# Patient Record
Sex: MALE | Race: BLACK OR AFRICAN AMERICAN | NOT HISPANIC OR LATINO | URBAN - METROPOLITAN AREA
[De-identification: names, ages, dates, MRNs, and addresses within clinical notes are randomized per-mention and may not be internally consistent; named-entity substitution may affect disease eponyms.]

---

## 2017-10-01 ENCOUNTER — EMERGENCY (EMERGENCY)
Facility: HOSPITAL | Age: 52
LOS: 1 days | Discharge: HOME | End: 2017-10-01
Attending: EMERGENCY MEDICINE | Admitting: EMERGENCY MEDICINE
Payer: SELF-PAY

## 2017-10-01 VITALS
TEMPERATURE: 98 F | SYSTOLIC BLOOD PRESSURE: 144 MMHG | OXYGEN SATURATION: 99 % | RESPIRATION RATE: 16 BRPM | DIASTOLIC BLOOD PRESSURE: 83 MMHG | HEART RATE: 68 BPM

## 2017-10-01 VITALS
TEMPERATURE: 98 F | DIASTOLIC BLOOD PRESSURE: 90 MMHG | OXYGEN SATURATION: 98 % | HEART RATE: 92 BPM | WEIGHT: 199.96 LBS | SYSTOLIC BLOOD PRESSURE: 152 MMHG | RESPIRATION RATE: 18 BRPM

## 2017-10-01 PROCEDURE — 99283 EMERGENCY DEPT VISIT LOW MDM: CPT

## 2017-10-01 RX ORDER — AZTREONAM 2 G
1 VIAL (EA) INJECTION
Qty: 14 | Refills: 0 | OUTPATIENT
Start: 2017-10-01 | End: 2017-10-08

## 2017-10-01 RX ADMIN — Medication 1 TABLET(S): at 20:03

## 2017-10-01 NOTE — ED PROVIDER NOTE - OBJECTIVE STATEMENT
51 m co urinary retention- states he drank 1 beer earlier today, went to the gym, has been unable   to urinate for hours - has had the same problem before in the past- when he drinks a beer  he goes into urinary retention  no f/c no urg/freq/dysuria  + abd distention and pressure-mod severe

## 2017-10-01 NOTE — ED ADULT NURSE NOTE - OBJECTIVE STATEMENT
Pt c/o urinary retention x 10 hours. Stated "I have enlarged prostate. I had a beer and I cant pee now." Pt denies fever, chills, N/V.

## 2017-10-01 NOTE — ED PROVIDER NOTE - MEDICAL DECISION MAKING DETAILS
urinary retention, valdez draining urine, px feels complete relief, d/c home leg bag, fu urology 1 week

## 2017-10-05 DIAGNOSIS — R33.9 RETENTION OF URINE, UNSPECIFIED: ICD-10-CM

## 2017-10-05 DIAGNOSIS — Z79.2 LONG TERM (CURRENT) USE OF ANTIBIOTICS: ICD-10-CM

## 2017-10-06 ENCOUNTER — EMERGENCY (EMERGENCY)
Facility: HOSPITAL | Age: 52
LOS: 1 days | Discharge: HOME | End: 2017-10-06
Admitting: EMERGENCY MEDICINE
Payer: SELF-PAY

## 2017-10-06 VITALS
RESPIRATION RATE: 16 BRPM | WEIGHT: 195.11 LBS | OXYGEN SATURATION: 98 % | SYSTOLIC BLOOD PRESSURE: 130 MMHG | HEART RATE: 77 BPM | TEMPERATURE: 98 F | DIASTOLIC BLOOD PRESSURE: 83 MMHG

## 2017-10-06 DIAGNOSIS — Z79.2 LONG TERM (CURRENT) USE OF ANTIBIOTICS: ICD-10-CM

## 2017-10-06 DIAGNOSIS — N40.1 BENIGN PROSTATIC HYPERPLASIA WITH LOWER URINARY TRACT SYMPTOMS: ICD-10-CM

## 2017-10-06 DIAGNOSIS — N50.89 OTHER SPECIFIED DISORDERS OF THE MALE GENITAL ORGANS: ICD-10-CM

## 2017-10-06 PROCEDURE — 99282 EMERGENCY DEPT VISIT SF MDM: CPT | Mod: 25

## 2017-10-06 PROCEDURE — 99282 EMERGENCY DEPT VISIT SF MDM: CPT

## 2017-10-06 PROCEDURE — 99053 MED SERV 10PM-8AM 24 HR FAC: CPT

## 2017-10-06 NOTE — ED PROVIDER NOTE - OBJECTIVE STATEMENT
patient with valdez placed recently for retention and requests it be removed now as cannot work with catheter in place and has no plausible urology follow up + reports similar pathology in past-> Valdez placed several days ago after reported retention

## 2017-10-06 NOTE — ED ADULT NURSE NOTE - OBJECTIVE STATEMENT
Patient  stated that he went drinking beer and every time he goes drinking his prostate swells, as per patient a Jacques was placed for urinary retention and he has had it in for about 4 days but he wants it taken out, because it is loose and he usually only keeps it in a few days,. Patient denies any discomfort or pain at this time

## 2017-10-06 NOTE — ED PROVIDER NOTE - MEDICAL DECISION MAKING DETAILS
patient adamant for Jacques to be removed ( Hx doing well at times and not well secondary to BPH and retention ) Counseled on return and Urology remains a follow up necessity patient adamant for Jacques to be removed ( Hx doing well at times and not well secondary to BPH and retention ) Counseled on return and Urology remains a follow up necessity ( + micturition in ED ) patient adamant for Jacques to be removed ( Hx doing well at times and not well secondary to BPH and retention ) Counseled on return and Urology remains a follow up necessity ( + micturition in ED ).

## 2017-10-06 NOTE — ED PROVIDER NOTE - CARE PLAN
Principal Discharge DX:	Benign prostatic hyperplasia, presence of lower urinary tract symptoms unspecified, unspecified morphology

## 2017-10-06 NOTE — ED ADULT NURSE NOTE - CHPI ED SYMPTOMS NEG
no dysuria/no chills/no hematuria/no vomiting/no blood in stool/no diarrhea/no abdominal distension/no nausea/no burning urination/no fever

## 2018-03-02 ENCOUNTER — EMERGENCY (EMERGENCY)
Facility: HOSPITAL | Age: 53
LOS: 1 days | Discharge: HOME | End: 2018-03-02
Attending: EMERGENCY MEDICINE | Admitting: EMERGENCY MEDICINE
Payer: SELF-PAY

## 2018-03-02 VITALS
DIASTOLIC BLOOD PRESSURE: 100 MMHG | TEMPERATURE: 98 F | RESPIRATION RATE: 18 BRPM | SYSTOLIC BLOOD PRESSURE: 177 MMHG | OXYGEN SATURATION: 95 % | HEART RATE: 107 BPM

## 2018-03-02 VITALS
RESPIRATION RATE: 18 BRPM | SYSTOLIC BLOOD PRESSURE: 134 MMHG | DIASTOLIC BLOOD PRESSURE: 90 MMHG | HEART RATE: 99 BPM | OXYGEN SATURATION: 95 %

## 2018-03-02 DIAGNOSIS — R33.9 RETENTION OF URINE, UNSPECIFIED: ICD-10-CM

## 2018-03-02 DIAGNOSIS — N40.1 BENIGN PROSTATIC HYPERPLASIA WITH LOWER URINARY TRACT SYMPTOMS: ICD-10-CM

## 2018-03-02 DIAGNOSIS — Z79.899 OTHER LONG TERM (CURRENT) DRUG THERAPY: ICD-10-CM

## 2018-03-02 LAB
APPEARANCE UR: CLEAR — SIGNIFICANT CHANGE UP
BILIRUB UR-MCNC: NEGATIVE — SIGNIFICANT CHANGE UP
COLOR SPEC: YELLOW — SIGNIFICANT CHANGE UP
DIFF PNL FLD: (no result)
GLUCOSE UR QL: NEGATIVE — SIGNIFICANT CHANGE UP
KETONES UR-MCNC: NEGATIVE — SIGNIFICANT CHANGE UP
LEUKOCYTE ESTERASE UR-ACNC: NEGATIVE — SIGNIFICANT CHANGE UP
NITRITE UR-MCNC: NEGATIVE — SIGNIFICANT CHANGE UP
PH UR: 6 — SIGNIFICANT CHANGE UP (ref 5–8)
PROT UR-MCNC: NEGATIVE MG/DL — SIGNIFICANT CHANGE UP
SP GR SPEC: <=1.005 — SIGNIFICANT CHANGE UP (ref 1–1.03)
UROBILINOGEN FLD QL: 0.2 E.U./DL — SIGNIFICANT CHANGE UP

## 2018-03-02 PROCEDURE — 87086 URINE CULTURE/COLONY COUNT: CPT

## 2018-03-02 PROCEDURE — 99284 EMERGENCY DEPT VISIT MOD MDM: CPT | Mod: 25

## 2018-03-02 PROCEDURE — 99053 MED SERV 10PM-8AM 24 HR FAC: CPT

## 2018-03-02 PROCEDURE — 81001 URINALYSIS AUTO W/SCOPE: CPT

## 2018-03-02 PROCEDURE — 51702 INSERT TEMP BLADDER CATH: CPT

## 2018-03-02 RX ORDER — TAMSULOSIN HYDROCHLORIDE 0.4 MG/1
1 CAPSULE ORAL
Qty: 30 | Refills: 0
Start: 2018-03-02 | End: 2018-03-31

## 2018-03-02 NOTE — ED PROVIDER NOTE - GASTROINTESTINAL, MLM
Abd soft, NT, ND, NABS. No guarding, rebound, or rigidity. No pulsatile abdominal masses. Bladder distention to the level of the umbilicus. Bedside sono shows distended bladder. No CVAT

## 2018-03-02 NOTE — ED ADULT NURSE NOTE - OBJECTIVE STATEMENT
pt states when he holds his urine for too long he needs to come in and get a catheter.  states he last voided a few hours ago. pt states when he holds his urine for too long he needs to come in and get a catheter.  states he last voided a few hours ago.  bladder distended.

## 2018-03-02 NOTE — ED PROVIDER NOTE - MEDICAL DECISION MAKING DETAILS
Pt p/w urinary retention, prior hx, non compliant w/ Flomax. Jacques placement, check UA. Anticipate d/c w/ leg bag, Flomax, f/u Uro

## 2018-03-02 NOTE — ED PROVIDER NOTE - OBJECTIVE STATEMENT
Pt w/ PMHx BPH, urinary retention, p/w urinary retention. Last normal urination 3:30 pm. Pt reports bladder distention and pressure. No flank pain, back pain, abd pain, fever, preceding F/U/D or hematuria. + prior hx U retention needing catheter. Pt is non compliant w/ Flomax. No hx renal disease. No other complaints.

## 2018-03-02 NOTE — ED PROVIDER NOTE - PROGRESS NOTE DETAILS
Jacques catheter placed w/ approx 800 mL clear yellow urine. Will d/c w/ leg bag, Flomax, f/u Urology

## 2018-03-05 LAB
CULTURE RESULTS: NO GROWTH — SIGNIFICANT CHANGE UP
SPECIMEN SOURCE: SIGNIFICANT CHANGE UP

## 2018-03-07 ENCOUNTER — EMERGENCY (EMERGENCY)
Facility: HOSPITAL | Age: 53
LOS: 1 days | Discharge: HOME | End: 2018-03-07
Attending: EMERGENCY MEDICINE | Admitting: EMERGENCY MEDICINE
Payer: SELF-PAY

## 2018-03-07 VITALS
HEART RATE: 83 BPM | SYSTOLIC BLOOD PRESSURE: 152 MMHG | RESPIRATION RATE: 18 BRPM | HEIGHT: 72 IN | OXYGEN SATURATION: 97 % | WEIGHT: 199.96 LBS | DIASTOLIC BLOOD PRESSURE: 99 MMHG | TEMPERATURE: 97 F

## 2018-03-07 VITALS
OXYGEN SATURATION: 97 % | HEART RATE: 81 BPM | RESPIRATION RATE: 18 BRPM | SYSTOLIC BLOOD PRESSURE: 150 MMHG | DIASTOLIC BLOOD PRESSURE: 90 MMHG

## 2018-03-07 DIAGNOSIS — T83.091D OTHER MECHANICAL COMPLICATION OF INDWELLING URETHRAL CATHETER, SUBSEQUENT ENCOUNTER: ICD-10-CM

## 2018-03-07 DIAGNOSIS — Z79.899 OTHER LONG TERM (CURRENT) DRUG THERAPY: ICD-10-CM

## 2018-03-07 LAB
APPEARANCE UR: (no result)
BILIRUB UR-MCNC: NEGATIVE — SIGNIFICANT CHANGE UP
COLOR SPEC: YELLOW — SIGNIFICANT CHANGE UP
DIFF PNL FLD: (no result)
GLUCOSE UR QL: NEGATIVE — SIGNIFICANT CHANGE UP
KETONES UR-MCNC: NEGATIVE — SIGNIFICANT CHANGE UP
LEUKOCYTE ESTERASE UR-ACNC: (no result)
NITRITE UR-MCNC: NEGATIVE — SIGNIFICANT CHANGE UP
PH UR: 6 — SIGNIFICANT CHANGE UP (ref 5–8)
PROT UR-MCNC: 30 MG/DL
SP GR SPEC: 1.02 — SIGNIFICANT CHANGE UP (ref 1–1.03)
UROBILINOGEN FLD QL: 0.2 E.U./DL — SIGNIFICANT CHANGE UP

## 2018-03-07 PROCEDURE — 87086 URINE CULTURE/COLONY COUNT: CPT

## 2018-03-07 PROCEDURE — 81001 URINALYSIS AUTO W/SCOPE: CPT

## 2018-03-07 PROCEDURE — 99283 EMERGENCY DEPT VISIT LOW MDM: CPT

## 2018-03-07 PROCEDURE — 99283 EMERGENCY DEPT VISIT LOW MDM: CPT | Mod: 25

## 2018-03-07 PROCEDURE — 99053 MED SERV 10PM-8AM 24 HR FAC: CPT

## 2018-03-07 PROCEDURE — 87186 SC STD MICRODIL/AGAR DIL: CPT

## 2018-03-07 NOTE — ED PROVIDER NOTE - PROGRESS NOTE DETAILS
pt voided, will have pt fu w urology pt voided, sent UA after pt voided, will have pt fu w urology, pt not wanting to wait for UA results. Advised cont'd use of flomax.

## 2018-03-07 NOTE — ED PROVIDER NOTE - OBJECTIVE STATEMENT
53 yo PMHx BPH, urinary retention w valdez in place, requesting removal, states placed 5 days ago. non compliant with Flomax, no suprapubic pain, abd pain, n/v/f/c. Has had similar issues in the past, no flank pain, no established urologist per pt. Pt has not tried anything for symptoms, no other aggravating or relieving factors.

## 2018-03-07 NOTE — ED ADULT NURSE NOTE - OBJECTIVE STATEMENT
Patient presents to ED c/o "defective catheter." Per patient, when the urinary leg bag fills up with urine, the catheter becomes detached from the leg bag.

## 2018-03-07 NOTE — ED PROVIDER NOTE - GASTROINTESTINAL, MLM
Abdomen soft, non-tender, no guarding. no cvat/flank tenderness Abdomen soft, non-tender, no guarding. no cvat/flank tenderness, no suprapubic tenderness

## 2018-03-07 NOTE — ED ADULT NURSE REASSESSMENT NOTE - NS ED NURSE REASSESS COMMENT FT1
valdez catheter removed. pt able to urinate on own. Pt states he will follow up with urology and take Flomax medication prescribed to him previously.

## 2018-03-07 NOTE — ED ADULT NURSE NOTE - CHPI ED SYMPTOMS NEG
no fever/no vomiting/no hematuria/no burning urination/no dysuria/no diarrhea/no nausea/no abdominal distension/no chills

## 2018-03-07 NOTE — ED PROVIDER NOTE - MEDICAL DECISION MAKING DETAILS
Pt adamant for valdez removal, will remove and attempt voiding trial, non toxic vss Pt adamant for valdez removal, will remove and attempt voiding trial, non toxic vss, non tender abdomen, no cvat.

## 2018-03-09 LAB
-  AMPICILLIN/SULBACTAM: SIGNIFICANT CHANGE UP
-  CEFAZOLIN: SIGNIFICANT CHANGE UP
-  CIPROFLOXACIN: SIGNIFICANT CHANGE UP
-  GENTAMICIN: SIGNIFICANT CHANGE UP
-  LINEZOLID: SIGNIFICANT CHANGE UP
-  OXACILLIN: SIGNIFICANT CHANGE UP
-  TETRACYCLINE: SIGNIFICANT CHANGE UP
-  VANCOMYCIN: SIGNIFICANT CHANGE UP
CULTURE RESULTS: SIGNIFICANT CHANGE UP
METHOD TYPE: SIGNIFICANT CHANGE UP
ORGANISM # SPEC MICROSCOPIC CNT: SIGNIFICANT CHANGE UP
ORGANISM # SPEC MICROSCOPIC CNT: SIGNIFICANT CHANGE UP
SPECIMEN SOURCE: SIGNIFICANT CHANGE UP

## 2018-03-12 RX ORDER — CEPHALEXIN 500 MG
1 CAPSULE ORAL
Qty: 14 | Refills: 0
Start: 2018-03-12 | End: 2018-03-18

## 2018-10-15 ENCOUNTER — EMERGENCY (EMERGENCY)
Facility: HOSPITAL | Age: 53
LOS: 1 days | Discharge: HOME | End: 2018-10-15
Attending: EMERGENCY MEDICINE | Admitting: EMERGENCY MEDICINE
Payer: SELF-PAY

## 2018-10-15 VITALS
HEART RATE: 84 BPM | SYSTOLIC BLOOD PRESSURE: 148 MMHG | RESPIRATION RATE: 16 BRPM | DIASTOLIC BLOOD PRESSURE: 93 MMHG | OXYGEN SATURATION: 100 % | TEMPERATURE: 98 F

## 2018-10-15 VITALS
SYSTOLIC BLOOD PRESSURE: 154 MMHG | WEIGHT: 195.99 LBS | OXYGEN SATURATION: 99 % | DIASTOLIC BLOOD PRESSURE: 97 MMHG | HEART RATE: 77 BPM | RESPIRATION RATE: 16 BRPM | HEIGHT: 72 IN | TEMPERATURE: 98 F

## 2018-10-15 DIAGNOSIS — Z79.899 OTHER LONG TERM (CURRENT) DRUG THERAPY: ICD-10-CM

## 2018-10-15 DIAGNOSIS — N40.1 BENIGN PROSTATIC HYPERPLASIA WITH LOWER URINARY TRACT SYMPTOMS: ICD-10-CM

## 2018-10-15 DIAGNOSIS — R33.9 RETENTION OF URINE, UNSPECIFIED: ICD-10-CM

## 2018-10-15 LAB
APPEARANCE UR: CLEAR — SIGNIFICANT CHANGE UP
BACTERIA # UR AUTO: SIGNIFICANT CHANGE UP /HPF
BILIRUB UR-MCNC: NEGATIVE — SIGNIFICANT CHANGE UP
COLOR SPEC: YELLOW — SIGNIFICANT CHANGE UP
DIFF PNL FLD: ABNORMAL
EPI CELLS # UR: SIGNIFICANT CHANGE UP /HPF (ref 0–5)
GLUCOSE UR QL: NEGATIVE — SIGNIFICANT CHANGE UP
KETONES UR-MCNC: NEGATIVE — SIGNIFICANT CHANGE UP
LEUKOCYTE ESTERASE UR-ACNC: NEGATIVE — SIGNIFICANT CHANGE UP
NITRITE UR-MCNC: NEGATIVE — SIGNIFICANT CHANGE UP
PH UR: 5 — SIGNIFICANT CHANGE UP (ref 5–8)
PROT UR-MCNC: NEGATIVE MG/DL — SIGNIFICANT CHANGE UP
RBC CASTS # UR COMP ASSIST: ABNORMAL /HPF
SP GR SPEC: 1.01 — SIGNIFICANT CHANGE UP (ref 1–1.03)
UROBILINOGEN FLD QL: 0.2 E.U./DL — SIGNIFICANT CHANGE UP
WBC UR QL: < 5 /HPF — SIGNIFICANT CHANGE UP

## 2018-10-15 PROCEDURE — 51702 INSERT TEMP BLADDER CATH: CPT

## 2018-10-15 PROCEDURE — 99283 EMERGENCY DEPT VISIT LOW MDM: CPT

## 2018-10-15 PROCEDURE — 87086 URINE CULTURE/COLONY COUNT: CPT

## 2018-10-15 PROCEDURE — 99283 EMERGENCY DEPT VISIT LOW MDM: CPT | Mod: 25

## 2018-10-15 PROCEDURE — 81001 URINALYSIS AUTO W/SCOPE: CPT

## 2018-10-15 NOTE — ED PROVIDER NOTE - CARE PLAN
Principal Discharge DX:	Urinary retention  Secondary Diagnosis:	Benign prostatic hyperplasia, unspecified whether lower urinary tract symptoms present

## 2018-10-15 NOTE — ED ADULT NURSE NOTE - OBJECTIVE STATEMENT
Pt presents with urinary retention since this AM. Pt states he has history of enlarged prostate and has needed a catheter before but he does not take any medication. Denies fever, chills, N/V/D.

## 2018-10-15 NOTE — ED ADULT NURSE NOTE - NSIMPLEMENTINTERV_GEN_ALL_ED
Implemented All Universal Safety Interventions:  [place] to call system. Call bell, personal items and telephone within reach. Instruct patient to call for assistance. Room bathroom lighting operational. Non-slip footwear when patient is off stretcher. Physically safe environment: no spills, clutter or unnecessary equipment. Stretcher in lowest position, wheels locked, appropriate side rails in place.

## 2018-10-15 NOTE — ED PROVIDER NOTE - OBJECTIVE STATEMENT
53 yo M with hx of BPH with hx of retention in past presenting with inability to urinate for 8 hours.  + dull nonradiating suprapubic pressure and discomfort.  No n/v, fever, chills, flank pain or hematuria.

## 2018-10-17 LAB
CULTURE RESULTS: NO GROWTH — SIGNIFICANT CHANGE UP
SPECIMEN SOURCE: SIGNIFICANT CHANGE UP

## 2018-10-27 ENCOUNTER — EMERGENCY (EMERGENCY)
Facility: HOSPITAL | Age: 53
LOS: 1 days | Discharge: HOME | End: 2018-10-27
Attending: EMERGENCY MEDICINE | Admitting: EMERGENCY MEDICINE
Payer: SELF-PAY

## 2018-10-27 VITALS
DIASTOLIC BLOOD PRESSURE: 92 MMHG | WEIGHT: 187.83 LBS | HEART RATE: 88 BPM | SYSTOLIC BLOOD PRESSURE: 158 MMHG | TEMPERATURE: 98 F | OXYGEN SATURATION: 98 % | RESPIRATION RATE: 18 BRPM
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
## 2018-10-27 DIAGNOSIS — Z79.899 OTHER LONG TERM (CURRENT) DRUG THERAPY: ICD-10-CM

## 2018-10-27 DIAGNOSIS — Z79.2 LONG TERM (CURRENT) USE OF ANTIBIOTICS: ICD-10-CM

## 2018-10-27 DIAGNOSIS — R21 RASH AND OTHER NONSPECIFIC SKIN ERUPTION: ICD-10-CM
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
## 2018-10-27 DIAGNOSIS — T83.098A OTHER MECHANICAL COMPLICATION OF OTHER URINARY CATHETER, INITIAL ENCOUNTER: ICD-10-CM

## 2018-10-27 PROCEDURE — 99282 EMERGENCY DEPT VISIT SF MDM: CPT

## 2018-10-27 NOTE — ED PROVIDER NOTE - MEDICAL DECISION MAKING DETAILS
Patient here for valdez removal after recent retention and here requesting valdez removal, removed w no difficulty and voided, STRONGLY advised urology f/u as concern enlarged prostate may be malignant, pt understood however by response doubt pt will f/u as advised. also advised STD testing / HIV and testing for syphillis due to rash, pt refused.

## 2018-10-27 NOTE — ED ADULT NURSE NOTE - NSIMPLEMENTINTERV_GEN_ALL_ED
Implemented All Universal Safety Interventions:  [place] to call system. Call bell, personal items and telephone within reach. Instruct patient to call for assistance. Room bathroom lighting operational. Non-slip footwear when patient is off stretcher. Physically safe environment: no spills, clutter or unnecessary equipment. Stretcher in lowest position, wheels locked, appropriate side rails in place.

## 2018-10-27 NOTE — ED PROVIDER NOTE - OBJECTIVE STATEMENT
51 yo , reports recurrent urinary retention due to enlarged prostate, recommended to see urologist but didn't and wants valdez out, feels well, no  pain no fever, rash today which he thinks must be from food allergy,

## 2018-10-27 NOTE — ED ADULT NURSE NOTE - OBJECTIVE STATEMENT
pt received in [facility] a & O x 3 pt has a urinary cathter fro BPH which was placed 2 weeks ago , pt states ' I usually have a catheter  for a while they remove and I have replaced when I need it again '

## 2018-10-27 NOTE — ED PROVIDER NOTE - NSPTACCESSSVCSAPPTDETAILS_ED_ALL_ED_FT
Follow up with a UROLOGIST urgently , AS discussed you have not had a workup for your enlarged prostate, You will need to be worked up to rule out Prostate Cancer.

## 2018-10-28 ENCOUNTER — EMERGENCY (EMERGENCY)
Facility: HOSPITAL | Age: 53
LOS: 1 days | Discharge: HOME | End: 2018-10-28
Attending: EMERGENCY MEDICINE | Admitting: EMERGENCY MEDICINE
Payer: SELF-PAY

## 2018-10-28 VITALS
TEMPERATURE: 99 F | RESPIRATION RATE: 18 BRPM | WEIGHT: 188.94 LBS | OXYGEN SATURATION: 98 % | DIASTOLIC BLOOD PRESSURE: 90 MMHG | SYSTOLIC BLOOD PRESSURE: 169 MMHG | HEART RATE: 99 BPM

## 2018-10-28 VITALS
RESPIRATION RATE: 18 BRPM | OXYGEN SATURATION: 95 % | DIASTOLIC BLOOD PRESSURE: 78 MMHG | HEART RATE: 96 BPM | TEMPERATURE: 98 F | SYSTOLIC BLOOD PRESSURE: 134 MMHG

## 2018-10-28 DIAGNOSIS — Z79.899 OTHER LONG TERM (CURRENT) DRUG THERAPY: ICD-10-CM

## 2018-10-28 DIAGNOSIS — Z79.2 LONG TERM (CURRENT) USE OF ANTIBIOTICS: ICD-10-CM

## 2018-10-28 DIAGNOSIS — R33.9 RETENTION OF URINE, UNSPECIFIED: ICD-10-CM

## 2018-10-28 LAB
APPEARANCE UR: CLEAR — SIGNIFICANT CHANGE UP
BACTERIA # UR AUTO: PRESENT /HPF
BILIRUB UR-MCNC: NEGATIVE — SIGNIFICANT CHANGE UP
COLOR SPEC: YELLOW — SIGNIFICANT CHANGE UP
COMMENT - URINE: SIGNIFICANT CHANGE UP
DIFF PNL FLD: ABNORMAL
EPI CELLS # UR: SIGNIFICANT CHANGE UP /HPF (ref 0–5)
GLUCOSE UR QL: NEGATIVE — SIGNIFICANT CHANGE UP
KETONES UR-MCNC: ABNORMAL MG/DL
LEUKOCYTE ESTERASE UR-ACNC: NEGATIVE — SIGNIFICANT CHANGE UP
NITRITE UR-MCNC: NEGATIVE — SIGNIFICANT CHANGE UP
PH UR: 5.5 — SIGNIFICANT CHANGE UP (ref 5–8)
PROT UR-MCNC: NEGATIVE MG/DL — SIGNIFICANT CHANGE UP
RBC CASTS # UR COMP ASSIST: ABNORMAL /HPF
SP GR SPEC: 1.01 — SIGNIFICANT CHANGE UP (ref 1–1.03)
UROBILINOGEN FLD QL: 0.2 E.U./DL — SIGNIFICANT CHANGE UP
WBC UR QL: < 5 /HPF — SIGNIFICANT CHANGE UP

## 2018-10-28 PROCEDURE — 87086 URINE CULTURE/COLONY COUNT: CPT

## 2018-10-28 PROCEDURE — 51702 INSERT TEMP BLADDER CATH: CPT

## 2018-10-28 PROCEDURE — 99283 EMERGENCY DEPT VISIT LOW MDM: CPT | Mod: 25

## 2018-10-28 PROCEDURE — 81001 URINALYSIS AUTO W/SCOPE: CPT

## 2018-10-28 PROCEDURE — 99283 EMERGENCY DEPT VISIT LOW MDM: CPT

## 2018-10-28 NOTE — ED ADULT TRIAGE NOTE - CHIEF COMPLAINT QUOTE
Pt c/o being unable to urinate for hours today, having pelvic pain. Pt states his valdez catheter was removed yesterday.

## 2018-10-28 NOTE — ED PROVIDER NOTE - OBJECTIVE STATEMENT
51 y/o m with hx of urinary retention presents with retention after having valdez removed day prior.  Has not urinated in several hours.  Denies fever, chills.  Pt has not followed up with urology regarding urinary retention.

## 2018-10-28 NOTE — ED ADULT NURSE NOTE - OBJECTIVE STATEMENT
pt in urinary retention ,hasn't passed urine since during the night ,bladder distended,pt had valdez catheter for approx 8 days which was removed yesterday

## 2018-10-28 NOTE — ED PROVIDER NOTE - MEDICAL DECISION MAKING DETAILS
Pt with hx of urinary retention just had valdez removed day prior - unable to urinate . Valdez placed with 800 cc output.  U/a neg.  Pt understands he needs follow up with [facility] for workup of retention.

## 2018-10-29 LAB
CULTURE RESULTS: NO GROWTH — SIGNIFICANT CHANGE UP
SPECIMEN SOURCE: SIGNIFICANT CHANGE UP

## 2018-11-19 ENCOUNTER — EMERGENCY (EMERGENCY)
Facility: HOSPITAL | Age: 53
LOS: 1 days | Discharge: HOME | End: 2018-11-19
Admitting: EMERGENCY MEDICINE
Payer: SELF-PAY

## 2018-11-19 VITALS
TEMPERATURE: 98 F | SYSTOLIC BLOOD PRESSURE: 150 MMHG | HEART RATE: 84 BPM | RESPIRATION RATE: 18 BRPM | DIASTOLIC BLOOD PRESSURE: 89 MMHG | OXYGEN SATURATION: 97 %

## 2018-11-19 DIAGNOSIS — Z79.2 LONG TERM (CURRENT) USE OF ANTIBIOTICS: ICD-10-CM

## 2018-11-19 DIAGNOSIS — T83.098A OTHER MECHANICAL COMPLICATION OF OTHER URINARY CATHETER, INITIAL ENCOUNTER: ICD-10-CM

## 2018-11-19 DIAGNOSIS — Z46.6 ENCOUNTER FOR FITTING AND ADJUSTMENT OF URINARY DEVICE: ICD-10-CM

## 2018-11-19 DIAGNOSIS — Z79.899 OTHER LONG TERM (CURRENT) DRUG THERAPY: ICD-10-CM

## 2018-11-19 PROCEDURE — 99053 MED SERV 10PM-8AM 24 HR FAC: CPT

## 2018-11-19 PROCEDURE — 99282 EMERGENCY DEPT VISIT SF MDM: CPT | Mod: 25

## 2018-11-19 PROCEDURE — 99283 EMERGENCY DEPT VISIT LOW MDM: CPT

## 2018-11-19 NOTE — ED PROVIDER NOTE - OBJECTIVE STATEMENT
The pt is a 51 y/o M, who returns to ED requesting his valdez cath be removed because he is unable to f/u w/gu - placed 3 wks ago. pt states that gets urinary retention every couple of months, hx of BPH, has not seen gu for eval. Denies abd pain, flank pain, fevers, chills, any other c/o

## 2018-11-19 NOTE — ED ADULT NURSE NOTE - NSIMPLEMENTINTERV_GEN_ALL_ED
Implemented All Universal Safety Interventions:  [place] to call system. Call bell, personal items and telephone within reach. Instruct patient to call for assistance. Room bathroom lighting operational. Non-slip footwear when patient is off stretcher. Physically safe environment: no spills, clutter or unnecessary equipment. Stretcher in lowest position, wheels locked, appropriate side rails in place.

## 2018-11-19 NOTE — ED ADULT TRIAGE NOTE - ARRIVAL INFO ADDITIONAL COMMENTS
pt states he had a valdez placed several weeks ago for urinary retention from an enlarged prostate and he called urology but they did not call him back and now he wants the catheter removed.

## 2018-11-19 NOTE — ED ADULT NURSE REASSESSMENT NOTE - NS ED NURSE REASSESS COMMENT FT1
Valdez was removed as ordered by PA. No issues found w/ valdez or urine collected in bag. urine is clear, yellow, odorless and w/o blood. Pt. is satisfied and excited about valdez removal. Pt admits valdez placement prevented some activities of daily living and was psychologically injurious.

## 2018-11-19 NOTE — ED PROVIDER NOTE - MEDICAL DECISION MAKING DETAILS
pt had valdez placed 3  wks ago, never f/u w/gu to get it removed, hx of bph and has not f/u w/gu for eval/tx, valdez removed as per pt's request, urinated w/o dif, importance of gu f/u discussed at length

## 2018-11-19 NOTE — ED ADULT NURSE NOTE - OBJECTIVE STATEMENT
53 y/o male requesting valdez removal. Pt denies pain, urinary complaints. pt reports valdez obstructs his lifestyle at this time and wants it removed. pt speaks clear, MAEx4, ambulates steady, unlabored breathing. Abd soft nt nd. Urine clear, and yellow. Skin dry warm

## 2019-03-23 ENCOUNTER — EMERGENCY (EMERGENCY)
Facility: HOSPITAL | Age: 54
LOS: 1 days | Discharge: HOME | End: 2019-03-23
Attending: EMERGENCY MEDICINE | Admitting: EMERGENCY MEDICINE
Payer: SELF-PAY

## 2019-03-23 VITALS
RESPIRATION RATE: 16 BRPM | TEMPERATURE: 98 F | SYSTOLIC BLOOD PRESSURE: 126 MMHG | DIASTOLIC BLOOD PRESSURE: 78 MMHG | OXYGEN SATURATION: 99 % | HEART RATE: 70 BPM

## 2019-03-23 VITALS
HEIGHT: 73 IN | SYSTOLIC BLOOD PRESSURE: 140 MMHG | WEIGHT: 192.02 LBS | TEMPERATURE: 98 F | OXYGEN SATURATION: 96 % | DIASTOLIC BLOOD PRESSURE: 87 MMHG | HEART RATE: 92 BPM | RESPIRATION RATE: 16 BRPM

## 2019-03-23 PROCEDURE — 99283 EMERGENCY DEPT VISIT LOW MDM: CPT | Mod: 25

## 2019-03-23 PROCEDURE — 51702 INSERT TEMP BLADDER CATH: CPT

## 2019-03-23 PROCEDURE — 99283 EMERGENCY DEPT VISIT LOW MDM: CPT

## 2019-03-23 NOTE — ED PROVIDER NOTE - NSFOLLOWUPINSTRUCTIONS_ED_ALL_ED_FT
Urinary Retention in Men    WHAT YOU NEED TO KNOW:    What is urinary retention? Urinary retention is a condition that develops when your bladder does not empty completely when you urinate.Male Reproductive System         What causes urinary retention?     An enlarged prostate      Blockages, such as a stone, growth, or narrowing of your urethra      A weak bladder muscle      Nerve damage from diabetes, stroke, or spinal cord injury      Bladder diverticula, which are pockets of urine that form in your bladder and do not empty      Certain medicines, such as narcotics, antihistamines, or antidepressants    What are the signs and symptoms of urinary retention?     Frequent urination, or the urge to urinate right after you finish      An urge to urinate, but your urine does not come out or dribbles out slowly and weakly      Frequent urine leaks that happen during the day or while you sleep      Pain or pressure when you urinate      Pain or stiffness in your abdomen, lower back, hips, or upper thighs      Blood in your urine    How is urinary retention diagnosed? Your healthcare provider will ask about your health history and the medicines you take. He will press or tap on your lower abdomen. You may need any of the following tests:     A digital rectal exam is when healthcare providers carefully feel the size of your prostate.      A post void residual test will show how much urine is left in your bladder after you urinate. You will be asked to urinate and then healthcare providers will use a small ultrasound machine to check how much urine is left in your bladder.      Blood or urine tests may show infection or prostate specific antigen (PSA) levels. PSA may be elevated in prostate cancer.      An ultrasound uses sound waves to show pictures on a monitor. An ultrasound may be done to show bladder stones, infection, or other problems.      A CT scan, or CAT scan, is a type of x-ray that is taken of your prostate, kidneys, and bladder. The pictures may show what is causing your urinary retention. You may be given a dye before the pictures are taken to help healthcare providers see the pictures better. Tell the healthcare provider if you have ever had an allergic reaction to contrast dye.    How is urinary retention treated?     A Jacques catheter is a tube put into your bladder to drain urine into a bag. Keep the bag below your waist. This will prevent urine from flowing back into your bladder and causing an infection or other problems. Also, keep the tube free of kinks so the urine will drain properly. Do not pull on the catheter. This can cause pain and bleeding, and may cause the catheter to come out.       Medicines can help decrease the size of your prostate, fight infection, and help you urinate more easily.      Surgery may be needed to treat the condition that is causing your urinary retention.     When should I contact my healthcare provider?     You have a fever.      You have pain when you urinate.      You have blood in your urine.      You have problems with your catheter.      You have questions or concerns about your condition or care.    When should I seek immediate care or call 911?     You have severe abdominal pain.      You are breathing faster than usual.      Your heartbeat is faster than usual.      Your face, hands, feet, or ankles are swollen.     CARE AGREEMENT:    You have the right to help plan your care. Learn about your health condition and how it may be treated. Discuss treatment options with your healthcare providers to decide what care you want to receive. You always have the right to refuse treatment.

## 2019-03-23 NOTE — ED PROVIDER NOTE - PHYSICAL EXAMINATION
CONSTITUTIONAL: Well appearing, well nourished, awake, alert and in no apparent distress.  HEENT: Head is atraumatic. Eyes clear bilaterally, normal EOMI. Airway patent.  CARDIAC: Normal rate, regular rhythm.  Heart sounds S1, S2.   RESPIRATORY: Breath sounds clear and equal bilaterally. no tachypnea, respiratory distress.   GASTROINTESTINAL: Abdomen soft, non-tender, no guarding, distension.  MUSCULOSKELETAL: Spine appears normal, no midline spinal tenderness, range of motion is not limited, no muscle or joint tenderness. no bony tenderness.   NEUROLOGICAL: Alert and oriented, no focal deficits, no motor or sensory deficits.  SKIN: Skin normal color for race, warm, dry and intact. No evidence of rash.  PSYCHIATRIC: Alert and oriented to person, place, time/situation. normal mood and affect. no apparent risk to self or others.

## 2019-03-23 NOTE — ED PROVIDER NOTE - CARE PROVIDER_API CALL
Aden Villarreal)  Urology  [address], [place] [address]  Phone: [phone]  Fax: [phone]  Follow Up Time:     Hugo Castro)  Urology  [address] [place] [address], [address] [address]  Phone: [phone]  Fax: [phone]  Follow Up Time:
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
## 2019-03-23 NOTE — ED ADULT NURSE NOTE - NSFALLRSKASSESSTYPE_ED_ALL_ED
Patient has been in [facility] for one year.   If you wish to have your patient continue in the clinic, please sign attached order.  Thank you.     Initial (On Arrival)

## 2019-03-23 NOTE — ED ADULT NURSE NOTE - OBJECTIVE STATEMENT
Pt presents with urinary retention x few hours today. Pt has history of prostate problems. He has been to the ED multiple times to have valdez placed, usually returns to have it removed a few days later. Pt has yet to follow up with urologist. Pt denies any other c/o.

## 2019-03-23 NOTE — ED ADULT TRIAGE NOTE - CHIEF COMPLAINT QUOTE
pt reports urinary retention for a few hours today. Pt reports previous history of retention and prostate problem

## 2019-03-23 NOTE — ED PROVIDER NOTE - OBJECTIVE STATEMENT
hx of BPH w urinary retention since this morning, has been to ED previously for similar sx, states normally gets valdez, supposed to fu with urology but has not been able to. No recent f/c, flank pain, abd pain, n/v, hematuria, cloudy urine.

## 2019-03-23 NOTE — ED PROVIDER NOTE - CARE PROVIDERS DIRECT ADDRESSES
[email or web address]

## 2019-03-23 NOTE — ED PROVIDER NOTE - CLINICAL SUMMARY MEDICAL DECISION MAKING FREE TEXT BOX
urinary retention wo systemic complaints, no f/c, abnormal VS, will place valdez, fu with urology strongly reiterated.

## 2019-03-27 DIAGNOSIS — R33.9 RETENTION OF URINE, UNSPECIFIED: ICD-10-CM

## 2019-03-27 DIAGNOSIS — Z79.2 LONG TERM (CURRENT) USE OF ANTIBIOTICS: ICD-10-CM

## 2019-03-28 ENCOUNTER — EMERGENCY (EMERGENCY)
Facility: HOSPITAL | Age: 54
LOS: 1 days | Discharge: HOME | End: 2019-03-28
Attending: EMERGENCY MEDICINE | Admitting: EMERGENCY MEDICINE
Payer: SELF-PAY

## 2019-03-28 VITALS
RESPIRATION RATE: 16 BRPM | TEMPERATURE: 98 F | HEART RATE: 77 BPM | DIASTOLIC BLOOD PRESSURE: 87 MMHG | SYSTOLIC BLOOD PRESSURE: 141 MMHG | OXYGEN SATURATION: 97 %

## 2019-03-28 VITALS
OXYGEN SATURATION: 99 % | DIASTOLIC BLOOD PRESSURE: 80 MMHG | RESPIRATION RATE: 16 BRPM | WEIGHT: 199.96 LBS | TEMPERATURE: 97 F | SYSTOLIC BLOOD PRESSURE: 127 MMHG | HEIGHT: 73 IN | HEART RATE: 88 BPM

## 2019-03-28 DIAGNOSIS — Z79.899 OTHER LONG TERM (CURRENT) DRUG THERAPY: ICD-10-CM

## 2019-03-28 DIAGNOSIS — R33.9 RETENTION OF URINE, UNSPECIFIED: ICD-10-CM

## 2019-03-28 DIAGNOSIS — Z79.2 LONG TERM (CURRENT) USE OF ANTIBIOTICS: ICD-10-CM

## 2019-03-28 PROCEDURE — 99283 EMERGENCY DEPT VISIT LOW MDM: CPT

## 2019-03-28 NOTE — ED PROVIDER NOTE - NSFOLLOWUPINSTRUCTIONS_ED_ALL_ED_FT
Acute Urinary Retention, Male  ImageAcute urinary retention is a condition in which a person is unable to pass urine. This can last for a short time or for a long time. If left untreated, it can result in kidney damage or other serious complications.    What are the causes?  This condition may be caused by:    Obstruction or narrowing of the tube that drains the bladder (urethra). This may be caused by surgery or problems with nearby organs, such as the prostate gland, which can press or squeeze the urethra.  Problems with the nerves in the bladder. These can be caused by diseases, such as multiple sclerosis, or by spinal cord injuries.  Certain medicines.  Tumors in the area of the pelvis, bladder, or urethra.  Diabetes.  Degenerative cognitive conditions such as delirium or dementia.  Bladder or urinary tract infection.  Constipation.  Blood in the urine (hematuria).  Injury to the bladder or urethra.   Psychological (psychogenic) conditions. Someone may hold his urine due to trauma or because he does not want to use the bathroom.    What increases the risk?  This condition is more likely to develop in older men. As men age, their prostate may become larger and may start pressing or squeezing on the bladder or the urethra.    What are the signs or symptoms?  Symptoms of this condition include:    Trouble urinating.  Pain in the lower abdomen.    Symptoms usually come on slowly over a long period of time.    How is this diagnosed?  This condition is diagnosed based on a physical exam and a medical history. You may also have other tests, including:    An ultrasound of the bladder or kidneys or both.  Blood tests.  A urine analysis.  Additional tests may be needed such as an MRI, kidney, or bladder function tests.    How is this treated?  Treatment for this condition may include:    Medicines.  Placing a thin, sterile tube (catheter) into the bladder to drain urine out of the body. This is called an indwelling urinary catheter. After being inserted, the catheter is held in place with a small balloon that is filled with sterile water. Urine drains from the catheter into a collection bag outside of the body.  Behavioral therapy.  Treatment for any underlying conditions.  If needed, you may be treated in the hospital for kidney function problems or to manage other complications.    Follow these instructions at home:  Take over-the-counter and prescription medicines only as told by your health care provider. Avoid certain medicines, such as decongestants, antihistamines, and some prescription medicines. Do not take any medicine unless your health care provider has approved.  If you were given an indwelling urinary catheter, take care of it as told by your health care provider.  Drink enough fluid to keep your urine clear or pale yellow.  If you were prescribed an antibiotic, take it as told by your health care provider. Do not stop taking the antibiotic even if you start to feel better.  Do not use any products that contain nicotine or tobacco, such as cigarettes and e-cigarettes. If you need help quitting, ask your health care provider.  Monitor any changes in your symptoms. Tell your health care provider about any changes.  If instructed, monitor your blood pressure at home. Report changes as told by your health care provider.  Keep all follow-up visits as told by your health care provider. This is important.  Contact a health care provider if:  You have uncomfortable bladder contractions that you cannot control (spasms) or you leak urine with the spasms.  Get help right away if:  You have chills or fever.  You have blood in your urine.  You have a catheter and:    Your catheter stops draining urine.  Your catheter falls out.    Summary  Acute urinary retention is a condition in which a person is unable to pass urine. If left untreated, it can result in kidney damage or other serious complications.  The cause of this condition may include an enlarged prostate. As men age, their prostate gland may become larger and may start pressing or squeezing on the bladder or the urethra.  Treatment for this condition may include medicines and placement of an indwelling urinary catheter.  Monitor any changes in your symptoms. Tell your health care provider about any changes.  This information is not intended to replace advice given to you by your health care provider. Make sure you discuss any questions you have with your health care provider.    Document Released: 03/26/2002 Document Revised: 01/19/2018 Document Reviewed: 01/19/2018  [employer] Patient Education © 2019 [employer]

## 2019-03-28 NOTE — ED ADULT NURSE NOTE - OBJECTIVE STATEMENT
Pt requesting to have Jacques catheter removed. Reported Jacques was placed on Saturday for urinary retention due to enlarged prostate. Denies pain, fever, chills, hematuria or any other complaints. Noted Jacques catheter intact draining clear yellow urine.

## 2019-03-28 NOTE — ED PROVIDER NOTE - CLINICAL SUMMARY MEDICAL DECISION MAKING FREE TEXT BOX
px w bph- requesting valdez removal- explained we typically do not remove them- will remove at px req's- acknowledges risk of re- valdez px w bph- requesting valdez removal- explained we typically do not remove them- will remove at px req's- acknowledges risk of re- valdez  px voided 400 ml- will fu with Urology

## 2019-03-28 NOTE — ED PROVIDER NOTE - OBJECTIVE STATEMENT
53 M w hx of bph w urinary retention- came to the ED a few days ago w urinary retention catheter placed- px is homeless without insurance- comes to ED for removal of cath  requesting removal of cath  no exac/allev factors  mod-severe

## 2019-03-28 NOTE — ED ADULT NURSE REASSESSMENT NOTE - NS ED NURSE REASSESS COMMENT FT1
Jacques catheter removed as per verbal order by Dr. Collins. Pt tolerated procedure. Denies any pain or discomfort at site. Noted 150mL of clear yellow urine from urinary drainage bag. Pt placed on voiding trial and informed pt to use urinal. Will continue to monitor.

## 2019-03-28 NOTE — ED ADULT NURSE NOTE - NSIMPLEMENTINTERV_GEN_ALL_ED
Implemented All Universal Safety Interventions:  [place] to call system. Call bell, personal items and telephone within reach. Instruct patient to call for assistance. Room bathroom lighting operational. Non-slip footwear when patient is off stretcher. Physically safe environment: no spills, clutter or unnecessary equipment. Stretcher in lowest position, wheels locked, appropriate side rails in place.

## 2019-03-28 NOTE — ED ADULT TRIAGE NOTE - CHIEF COMPLAINT QUOTE
Pt w/ urinary catheter in place for BPH presents to ED today seeking catheter removal.  Pt elicits 14F in place that is patent and draining fluid.  Pt denies pain, fever/chills.  Pt states he has not seen his urologist recently.

## 2019-05-11 NOTE — ED ADULT NURSE NOTE - CAS DISCH ACCOMP BY
Bedside and Verbal shift change report received from . Report included the following information SBAR, Kardex, ED Summary, Intake/Output, MAR and Recent Results. Self

## 2019-06-13 NOTE — ED ADULT NURSE NOTE - CAS TRG GEN SKIN CONDITION
I sent in [place] twice a day hopefully that will work if not then I need to refer her to dermatology I sent it to [facility] should come soon thanks Dry/Warm

## 2019-06-26 ENCOUNTER — EMERGENCY (EMERGENCY)
Facility: HOSPITAL | Age: 54
LOS: 1 days | Discharge: HOME | End: 2019-06-26
Attending: EMERGENCY MEDICINE | Admitting: EMERGENCY MEDICINE
Payer: SELF-PAY

## 2019-06-26 VITALS
SYSTOLIC BLOOD PRESSURE: 163 MMHG | DIASTOLIC BLOOD PRESSURE: 96 MMHG | HEART RATE: 90 BPM | WEIGHT: 190.04 LBS | RESPIRATION RATE: 20 BRPM | TEMPERATURE: 98 F | OXYGEN SATURATION: 99 % | HEIGHT: 73 IN

## 2019-06-26 DIAGNOSIS — R33.9 RETENTION OF URINE, UNSPECIFIED: ICD-10-CM

## 2019-06-26 DIAGNOSIS — N40.1 BENIGN PROSTATIC HYPERPLASIA WITH LOWER URINARY TRACT SYMPTOMS: ICD-10-CM

## 2019-06-26 LAB
APPEARANCE UR: CLEAR — SIGNIFICANT CHANGE UP
BILIRUB UR-MCNC: NEGATIVE — SIGNIFICANT CHANGE UP
COLOR SPEC: YELLOW — SIGNIFICANT CHANGE UP
DIFF PNL FLD: NEGATIVE — SIGNIFICANT CHANGE UP
GLUCOSE UR QL: NEGATIVE — SIGNIFICANT CHANGE UP
KETONES UR-MCNC: NEGATIVE — SIGNIFICANT CHANGE UP
LEUKOCYTE ESTERASE UR-ACNC: NEGATIVE — SIGNIFICANT CHANGE UP
NITRITE UR-MCNC: NEGATIVE — SIGNIFICANT CHANGE UP
PH UR: 6.5 — SIGNIFICANT CHANGE UP (ref 5–8)
PROT UR-MCNC: NEGATIVE MG/DL — SIGNIFICANT CHANGE UP
SP GR SPEC: 1.01 — SIGNIFICANT CHANGE UP (ref 1–1.03)
UROBILINOGEN FLD QL: 0.2 E.U./DL — SIGNIFICANT CHANGE UP

## 2019-06-26 PROCEDURE — 51702 INSERT TEMP BLADDER CATH: CPT

## 2019-06-26 PROCEDURE — 99283 EMERGENCY DEPT VISIT LOW MDM: CPT

## 2019-06-26 PROCEDURE — 81003 URINALYSIS AUTO W/O SCOPE: CPT

## 2019-06-26 PROCEDURE — 99283 EMERGENCY DEPT VISIT LOW MDM: CPT | Mod: 25

## 2019-06-26 PROCEDURE — 87086 URINE CULTURE/COLONY COUNT: CPT

## 2019-06-26 RX ORDER — CEFPODOXIME PROXETIL 100 MG
100 TABLET ORAL ONCE
Refills: 0 | Status: COMPLETED | OUTPATIENT
Start: 2019-06-26 | End: 2019-06-26

## 2019-06-26 RX ORDER — CEFPODOXIME PROXETIL 100 MG
1 TABLET ORAL
Qty: 14 | Refills: 0
Start: 2019-06-26 | End: 2019-07-02

## 2019-06-26 RX ADMIN — Medication 100 MILLIGRAM(S): at 23:32

## 2019-06-26 NOTE — ED PROVIDER NOTE - CARE PLAN
Principal Discharge DX:	Urinary retention  Secondary Diagnosis:	Benign prostatic hyperplasia, presence of lower urinary tract symptoms unspecified, unspecified morphology

## 2019-06-26 NOTE — ED ADULT NURSE NOTE - CHPI ED NUR SYMPTOMS NEG
no fever/no hematuria/no blood in stool/no diarrhea/no nausea/no dysuria/no chills/no abdominal distension

## 2019-06-26 NOTE — ED PROVIDER NOTE - ATTENDING CONTRIBUTION TO CARE
acute onset urinary retention likely secondary to BPH, pt well appearing , no longer uncomfortable, no UTI, stable for outpt urology follow up.

## 2019-06-26 NOTE — ED PROVIDER NOTE - OBJECTIVE STATEMENT
Hx BPH and multiple catheters / chronic at times indwelling catheter -> now presents as in retention today  seeking relief no F/C Hx BPH and multiple catheters  / chronic at times indwelling catheter -> now presents as in retention today  seeking relief no F/C

## 2019-06-26 NOTE — ED ADULT NURSE REASSESSMENT NOTE - NS ED NURSE REASSESS COMMENT FT1
Jacques catheter inserted as ordered by JINA Bateman.  A 16Fr coude catheter was used.  Sterile technique completed throughout procedure and catheter secured to right medial thigh using Stat Lock.  Patient tolerated procedure well.  Clear Yellow urine noted upon insertion.  Initial output of 600mL of urine noted.  Will change patient to leg bag once d/c'd from ED.

## 2019-06-26 NOTE — ED PROVIDER NOTE - NSFOLLOWUPCLINICS_GEN_ALL_ED_FT
[facility]  Urology  [address]  [place], [place] [address]  Phone: [phone]  Fax:   Follow Up Time:

## 2019-06-26 NOTE — ED ADULT NURSE NOTE - OBJECTIVE STATEMENT
Presents to ED for Urinary retention x 7 hours.  Patient reports history of urinary retention in the past due to enlarged prostate which is relieved with valdez catheter placement for approximately 1 week each time.  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  Denies any fevers, chills, hematuria, incontinence, CP, abd pain, SOB.

## 2019-06-26 NOTE — ED ADULT NURSE REASSESSMENT NOTE - NS ED NURSE REASSESS COMMENT FT1
Patient d/c'd with valdez catheter in place.  Attached to Leg bag as ordered.  Patient retaught about valdez care and to return if new symptoms arise.  Abx given as ordered and medication for outpatient reviewed.

## 2019-06-28 LAB
CULTURE RESULTS: NO GROWTH — SIGNIFICANT CHANGE UP
SPECIMEN SOURCE: SIGNIFICANT CHANGE UP

## 2019-07-02 ENCOUNTER — EMERGENCY (EMERGENCY)
Facility: HOSPITAL | Age: 54
LOS: 1 days | Discharge: HOME | End: 2019-07-02
Admitting: EMERGENCY MEDICINE
Payer: SELF-PAY

## 2019-07-02 VITALS
HEART RATE: 90 BPM | RESPIRATION RATE: 18 BRPM | HEIGHT: 73 IN | SYSTOLIC BLOOD PRESSURE: 153 MMHG | TEMPERATURE: 98 F | OXYGEN SATURATION: 97 % | WEIGHT: 187.39 LBS | DIASTOLIC BLOOD PRESSURE: 92 MMHG

## 2019-07-02 DIAGNOSIS — Z46.6 ENCOUNTER FOR FITTING AND ADJUSTMENT OF URINARY DEVICE: ICD-10-CM

## 2019-07-02 PROCEDURE — 99283 EMERGENCY DEPT VISIT LOW MDM: CPT

## 2019-07-02 PROCEDURE — 99282 EMERGENCY DEPT VISIT SF MDM: CPT

## 2019-07-02 PROCEDURE — 99053 MED SERV 10PM-8AM 24 HR FAC: CPT

## 2019-07-02 NOTE — ED PROVIDER NOTE - CARE PROVIDER_API CALL
Aden Villarreal)  Urology  [address], [facility] [place], [place] [address]  Phone: [phone]  Fax: [phone]  Follow Up Time:

## 2019-07-02 NOTE — ED PROVIDER NOTE - OBJECTIVE STATEMENT
52 yo M with pmh of BPH requesting removal of catheter. Valdez placed 5 days ago for retention. [place] and every 6 months he goes into retention and comes to the ED to have a valdez placed and taken out because he does not have insurance. Denies fever, chills, abd pain, n/v, dysuria, hematuria.

## 2019-07-02 NOTE — ED PROVIDER NOTE - NSFOLLOWUPINSTRUCTIONS_ED_ALL_ED_FT
Urinary Retention    Urinary retention is the inability to completely empty your bladder. This is a common problem in older men, especially with enlarged prostates. If you are sent home with a valdez catheter and a drainage system make sure to keep the drainage bag emptied and lower than your catheter. Keep the valdez catheter in until you follow up with a urologist.    SEEK IMMEDIATE MEDICAL CARE IF YOU DEVELOP THE FOLLOWING SYMPTOMS: the catheter stops draining urine, the catheter falls out, abdominal pain, nausea/vomiting, or chills/fever.

## 2019-07-08 ENCOUNTER — INBOUND DOCUMENT (OUTPATIENT)
Age: 54
End: 2019-07-08

## 2019-09-06 ENCOUNTER — EMERGENCY (EMERGENCY)
Facility: HOSPITAL | Age: 54
LOS: 1 days | Discharge: HOME | End: 2019-09-06
Attending: EMERGENCY MEDICINE | Admitting: EMERGENCY MEDICINE
Payer: SELF-PAY

## 2019-09-06 VITALS
OXYGEN SATURATION: 100 % | TEMPERATURE: 98 F | HEIGHT: 73 IN | WEIGHT: 190.04 LBS | HEART RATE: 93 BPM | RESPIRATION RATE: 19 BRPM | SYSTOLIC BLOOD PRESSURE: 156 MMHG | DIASTOLIC BLOOD PRESSURE: 87 MMHG

## 2019-09-06 VITALS
RESPIRATION RATE: 18 BRPM | OXYGEN SATURATION: 100 % | SYSTOLIC BLOOD PRESSURE: 137 MMHG | TEMPERATURE: 98 F | HEART RATE: 82 BPM | DIASTOLIC BLOOD PRESSURE: 82 MMHG

## 2019-09-06 DIAGNOSIS — R33.9 RETENTION OF URINE, UNSPECIFIED: ICD-10-CM

## 2019-09-06 LAB
ALBUMIN SERPL ELPH-MCNC: 4.5 G/DL — SIGNIFICANT CHANGE UP (ref 3.3–5)
ALP SERPL-CCNC: 62 U/L — SIGNIFICANT CHANGE UP (ref 40–120)
ALT FLD-CCNC: 13 U/L — SIGNIFICANT CHANGE UP (ref 10–45)
ANION GAP SERPL CALC-SCNC: 12 MMOL/L — SIGNIFICANT CHANGE UP (ref 5–17)
APPEARANCE UR: ABNORMAL
AST SERPL-CCNC: 16 U/L — SIGNIFICANT CHANGE UP (ref 10–40)
BACTERIA # UR AUTO: PRESENT /HPF
BASOPHILS # BLD AUTO: 0.04 K/UL — SIGNIFICANT CHANGE UP (ref 0–0.2)
BASOPHILS NFR BLD AUTO: 0.5 % — SIGNIFICANT CHANGE UP (ref 0–2)
BILIRUB SERPL-MCNC: 1.5 MG/DL — HIGH (ref 0.2–1.2)
BILIRUB UR-MCNC: NEGATIVE — SIGNIFICANT CHANGE UP
BUN SERPL-MCNC: 15 MG/DL — SIGNIFICANT CHANGE UP (ref 7–23)
CALCIUM SERPL-MCNC: 9.5 MG/DL — SIGNIFICANT CHANGE UP (ref 8.4–10.5)
CHLORIDE SERPL-SCNC: 101 MMOL/L — SIGNIFICANT CHANGE UP (ref 96–108)
CO2 SERPL-SCNC: 25 MMOL/L — SIGNIFICANT CHANGE UP (ref 22–31)
COLOR SPEC: YELLOW — SIGNIFICANT CHANGE UP
COMMENT - URINE: SIGNIFICANT CHANGE UP
CREAT SERPL-MCNC: 0.89 MG/DL — SIGNIFICANT CHANGE UP (ref 0.5–1.3)
DIFF PNL FLD: ABNORMAL
EOSINOPHIL # BLD AUTO: 0.05 K/UL — SIGNIFICANT CHANGE UP (ref 0–0.5)
EOSINOPHIL NFR BLD AUTO: 0.6 % — SIGNIFICANT CHANGE UP (ref 0–6)
EPI CELLS # UR: SIGNIFICANT CHANGE UP /HPF (ref 0–5)
GLUCOSE SERPL-MCNC: 103 MG/DL — HIGH (ref 70–99)
GLUCOSE UR QL: NEGATIVE — SIGNIFICANT CHANGE UP
HCT VFR BLD CALC: 40.6 % — SIGNIFICANT CHANGE UP (ref 39–50)
HGB BLD-MCNC: 13.2 G/DL — SIGNIFICANT CHANGE UP (ref 13–17)
IMM GRANULOCYTES NFR BLD AUTO: 0.5 % — SIGNIFICANT CHANGE UP (ref 0–1.5)
KETONES UR-MCNC: NEGATIVE — SIGNIFICANT CHANGE UP
LEUKOCYTE ESTERASE UR-ACNC: NEGATIVE — SIGNIFICANT CHANGE UP
LYMPHOCYTES # BLD AUTO: 1.01 K/UL — SIGNIFICANT CHANGE UP (ref 1–3.3)
LYMPHOCYTES # BLD AUTO: 12.2 % — LOW (ref 13–44)
MCHC RBC-ENTMCNC: 28.9 PG — SIGNIFICANT CHANGE UP (ref 27–34)
MCHC RBC-ENTMCNC: 32.5 GM/DL — SIGNIFICANT CHANGE UP (ref 32–36)
MCV RBC AUTO: 88.8 FL — SIGNIFICANT CHANGE UP (ref 80–100)
MONOCYTES # BLD AUTO: 0.45 K/UL — SIGNIFICANT CHANGE UP (ref 0–0.9)
MONOCYTES NFR BLD AUTO: 5.4 % — SIGNIFICANT CHANGE UP (ref 2–14)
NEUTROPHILS # BLD AUTO: 6.69 K/UL — SIGNIFICANT CHANGE UP (ref 1.8–7.4)
NEUTROPHILS NFR BLD AUTO: 80.8 % — HIGH (ref 43–77)
NITRITE UR-MCNC: NEGATIVE — SIGNIFICANT CHANGE UP
NRBC # BLD: 0 /100 WBCS — SIGNIFICANT CHANGE UP (ref 0–0)
PH UR: 7 — SIGNIFICANT CHANGE UP (ref 5–8)
PLATELET # BLD AUTO: 208 K/UL — SIGNIFICANT CHANGE UP (ref 150–400)
POTASSIUM SERPL-MCNC: 3.8 MMOL/L — SIGNIFICANT CHANGE UP (ref 3.5–5.3)
POTASSIUM SERPL-SCNC: 3.8 MMOL/L — SIGNIFICANT CHANGE UP (ref 3.5–5.3)
PROT SERPL-MCNC: 8 G/DL — SIGNIFICANT CHANGE UP (ref 6–8.3)
PROT UR-MCNC: ABNORMAL MG/DL
RBC # BLD: 4.57 M/UL — SIGNIFICANT CHANGE UP (ref 4.2–5.8)
RBC # FLD: 12.8 % — SIGNIFICANT CHANGE UP (ref 10.3–14.5)
RBC CASTS # UR COMP ASSIST: > 10 /HPF
SODIUM SERPL-SCNC: 138 MMOL/L — SIGNIFICANT CHANGE UP (ref 135–145)
SP GR SPEC: <=1.005 — SIGNIFICANT CHANGE UP (ref 1–1.03)
UROBILINOGEN FLD QL: 0.2 E.U./DL — SIGNIFICANT CHANGE UP
WBC # BLD: 8.28 K/UL — SIGNIFICANT CHANGE UP (ref 3.8–10.5)
WBC # FLD AUTO: 8.28 K/UL — SIGNIFICANT CHANGE UP (ref 3.8–10.5)
WBC UR QL: < 5 /HPF — SIGNIFICANT CHANGE UP

## 2019-09-06 PROCEDURE — 99283 EMERGENCY DEPT VISIT LOW MDM: CPT

## 2019-09-06 PROCEDURE — 81001 URINALYSIS AUTO W/SCOPE: CPT

## 2019-09-06 PROCEDURE — 85025 COMPLETE CBC W/AUTO DIFF WBC: CPT

## 2019-09-06 PROCEDURE — 80053 COMPREHEN METABOLIC PANEL: CPT

## 2019-09-06 PROCEDURE — 99284 EMERGENCY DEPT VISIT MOD MDM: CPT

## 2019-09-06 PROCEDURE — 87086 URINE CULTURE/COLONY COUNT: CPT

## 2019-09-06 PROCEDURE — 36415 COLL VENOUS BLD VENIPUNCTURE: CPT

## 2019-09-06 NOTE — ED ADULT NURSE NOTE - OBJECTIVE STATEMENT
53y M, A&ox3, presents to ed for urinary retention x5 hrs. Reports hx of BPH with previous use of indwelling urethral catheters. Pt reports last catheter "few months ago" denies fevers nor chills, no flank pain. no cp, no sob. no urinary s/s. Distended bladder on palpation, tender. Will continue to monitor.

## 2019-09-06 NOTE — ED PROVIDER NOTE - OBJECTIVE STATEMENT
54 yo with recurrent urinary retention, known enlarged prostate, pt reports urinary retention for the last 5 hrs w associated lower suprapubic discomfort, otherwise feels well, no fevers, no n/v/d , no dysuria urgency or frequency prior to onset of retention, no hematuria, no back pain.

## 2019-09-06 NOTE — ED PROVIDER NOTE - PATIENT PORTAL LINK FT
You can access the FollowMyHealth Patient Portal offered by [facility] by registering at the following website: [email or web address]. By joining [employer]’s FollowMyHealth portal, you will also be able to view your health information using other applications (apps) compatible with our system.

## 2019-09-06 NOTE — ED PROVIDER NOTE - CARE PROVIDER_API CALL
Aden Villarreal)  Urology  [address], [facility], [place] [address]  Phone: [phone]  Fax: [phone]  Follow Up Time:

## 2019-09-06 NOTE — ED ADULT NURSE NOTE - NSIMPLEMENTINTERV_GEN_ALL_ED
Implemented All Universal Safety Interventions:  [place] to call system. Call bell, personal items and telephone within reach. Instruct patient to call for assistance. Room bathroom lighting operational. Non-slip footwear when patient is off stretcher. Physically safe environment: no spills, clutter or unnecessary equipment. Stretcher in lowest position, wheels locked, appropriate side rails in place.

## 2019-09-06 NOTE — ED PROVIDER NOTE - CLINICAL SUMMARY MEDICAL DECISION MAKING FREE TEXT BOX
+ recurrent retention , pain relieved after valdez, no UTI or renal dysfunction, advised Urology f/u in 2-3 days, discussed emergent return instructions

## 2019-09-08 LAB
CULTURE RESULTS: NO GROWTH — SIGNIFICANT CHANGE UP
SPECIMEN SOURCE: SIGNIFICANT CHANGE UP

## 2019-09-26 ENCOUNTER — EMERGENCY (EMERGENCY)
Facility: HOSPITAL | Age: 54
LOS: 1 days | Discharge: HOME | End: 2019-09-26
Attending: EMERGENCY MEDICINE | Admitting: EMERGENCY MEDICINE
Payer: SELF-PAY

## 2019-09-26 VITALS
OXYGEN SATURATION: 99 % | RESPIRATION RATE: 18 BRPM | DIASTOLIC BLOOD PRESSURE: 72 MMHG | TEMPERATURE: 98 F | HEART RATE: 82 BPM | SYSTOLIC BLOOD PRESSURE: 127 MMHG | HEIGHT: 73 IN | WEIGHT: 190.04 LBS

## 2019-09-26 PROCEDURE — 99283 EMERGENCY DEPT VISIT LOW MDM: CPT

## 2019-09-26 RX ORDER — TAMSULOSIN HYDROCHLORIDE 0.4 MG/1
0.4 CAPSULE ORAL ONCE
Refills: 0 | Status: COMPLETED | OUTPATIENT
Start: 2019-09-26 | End: 2019-09-26

## 2019-09-26 RX ORDER — TAMSULOSIN HYDROCHLORIDE 0.4 MG/1
1 CAPSULE ORAL
Qty: 30 | Refills: 0
Start: 2019-09-26 | End: 2019-10-25

## 2019-09-26 RX ADMIN — TAMSULOSIN HYDROCHLORIDE 0.4 MILLIGRAM(S): 0.4 CAPSULE ORAL at 17:59

## 2019-09-26 NOTE — ED PROVIDER NOTE - OBJECTIVE STATEMENT
54 y/o M with pmhx of Benign prostatic hyperplasia c/o urinary catheter removal. Pt states cath has been on him for 2weeks and would want it to be removed. Denies fever, chills, and vomiting.

## 2019-09-26 NOTE — ED PROVIDER NOTE - CLINICAL SUMMARY MEDICAL DECISION MAKING FREE TEXT BOX
52 yo male with hx of BPH/ urinary retention- has valdez with leg bag  x 2 weeks- req removal-  - dw pt if he cant void within 8 hrs  must RT to ED for replacement of catheter  given flomax on dc

## 2019-09-26 NOTE — ED ADULT NURSE NOTE - OBJECTIVE STATEMENT
53y M, A&ox3, presents to ed c/o discomfort from valdez catheter. states "im unable to go to work because of it" Reports has had valdez in for >2 weeks and reports "just need it out" no fevers nor chills, no flank pain. no cp, no sob Reports has not followed up with urology yet. Will continue to monitor.

## 2019-09-26 NOTE — ED ADULT NURSE REASSESSMENT NOTE - NS ED NURSE REASSESS COMMENT FT1
Indwelling catheter removed per order. pt tolerated well. Pt given urinal. instructed to void. verbalized understanding.

## 2019-09-26 NOTE — ED PROVIDER NOTE - NSTIMEPROVIDERCAREINITIATE_GEN_ER
C/O right sided abdominal pain and right sided testicular pain since this AM. Pt also reports blood in urine. Pt reports hx of kidney stones. 26-Sep-2019 16:57

## 2019-09-26 NOTE — ED PROVIDER NOTE - PATIENT PORTAL LINK FT
You can access the FollowMyHealth Patient Portal offered by [facility] by registering at the following website: [email or web address]. By joining [employer]’s FollowMyHealth portal, you will also be able to view your health information using other applications (apps) compatible with our system. Discharged

## 2019-09-26 NOTE — ED ADULT TRIAGE NOTE - CHIEF COMPLAINT QUOTE
Patient states has a Jacques catheter in place X weeks due to prostate problems, states wants to have Jacques catheter removed. Denies any pain or catheter complications.

## 2019-09-26 NOTE — ED PROVIDER NOTE - CARE PROVIDER_API CALL
Aden Villarreal)  Urology  [address], [facility]  [place], [place] [address]  Phone: [phone]  Fax: [phone]  Follow Up Time: 1-3 Days

## 2019-09-26 NOTE — ED ADULT NURSE NOTE - MODE OF DISCHARGE
Height (cm): 177.8 (04-24 @ 19:27)  Weight (kg): 104.3 (04-24 @ 19:27)  BMI (kg/m2): 33 (04-24 @ 19:27)  ICU Vital Signs Last 24 Hrs  T(F): 104.2 (24 Apr 2019 19:27), Max: 104.2 (24 Apr 2019 19:27)  HR: 128 (24 Apr 2019 19:27) (128 - 128)  BP: 158/82 (24 Apr 2019 19:27) (158/82 - 158/82)  RR: 19 (24 Apr 2019 19:27) (19 - 19)  SpO2: 96% (24 Apr 2019 19:27) (96% - 96%) Ambulatory

## 2019-09-26 NOTE — ED ADULT NURSE NOTE - CHPI ED NUR SYMPTOMS NEG
no diarrhea/no blood in stool/no chills/no fever/no abdominal distension/no dysuria/no hematuria/no nausea

## 2019-09-28 ENCOUNTER — EMERGENCY (EMERGENCY)
Facility: HOSPITAL | Age: 54
LOS: 1 days | Discharge: HOME | End: 2019-09-28
Attending: EMERGENCY MEDICINE | Admitting: EMERGENCY MEDICINE
Payer: SELF-PAY

## 2019-09-28 VITALS
DIASTOLIC BLOOD PRESSURE: 98 MMHG | HEIGHT: 73 IN | SYSTOLIC BLOOD PRESSURE: 160 MMHG | HEART RATE: 100 BPM | TEMPERATURE: 99 F | RESPIRATION RATE: 16 BRPM | WEIGHT: 169.32 LBS | OXYGEN SATURATION: 100 %

## 2019-09-28 VITALS
RESPIRATION RATE: 18 BRPM | SYSTOLIC BLOOD PRESSURE: 125 MMHG | OXYGEN SATURATION: 99 % | HEART RATE: 85 BPM | DIASTOLIC BLOOD PRESSURE: 70 MMHG

## 2019-09-28 LAB
APPEARANCE UR: CLEAR — SIGNIFICANT CHANGE UP
BILIRUB UR-MCNC: NEGATIVE — SIGNIFICANT CHANGE UP
COLOR SPEC: YELLOW — SIGNIFICANT CHANGE UP
DIFF PNL FLD: ABNORMAL
GLUCOSE UR QL: NEGATIVE — SIGNIFICANT CHANGE UP
KETONES UR-MCNC: NEGATIVE — SIGNIFICANT CHANGE UP
LEUKOCYTE ESTERASE UR-ACNC: ABNORMAL
NITRITE UR-MCNC: POSITIVE
PH UR: 6.5 — SIGNIFICANT CHANGE UP (ref 5–8)
PROT UR-MCNC: NEGATIVE MG/DL — SIGNIFICANT CHANGE UP
SP GR SPEC: 1.01 — SIGNIFICANT CHANGE UP (ref 1–1.03)
UROBILINOGEN FLD QL: 0.2 E.U./DL — SIGNIFICANT CHANGE UP

## 2019-09-28 PROCEDURE — 96374 THER/PROPH/DIAG INJ IV PUSH: CPT | Mod: XU

## 2019-09-28 PROCEDURE — 81001 URINALYSIS AUTO W/SCOPE: CPT

## 2019-09-28 PROCEDURE — 99284 EMERGENCY DEPT VISIT MOD MDM: CPT | Mod: 25

## 2019-09-28 PROCEDURE — 99284 EMERGENCY DEPT VISIT MOD MDM: CPT

## 2019-09-28 PROCEDURE — 51702 INSERT TEMP BLADDER CATH: CPT

## 2019-09-28 RX ORDER — CEFUROXIME AXETIL 250 MG
1 TABLET ORAL
Qty: 14 | Refills: 0
Start: 2019-09-28 | End: 2019-10-04

## 2019-09-28 RX ORDER — CEFTRIAXONE 500 MG/1
1000 INJECTION, POWDER, FOR SOLUTION INTRAMUSCULAR; INTRAVENOUS ONCE
Refills: 0 | Status: COMPLETED | OUTPATIENT
Start: 2019-09-28 | End: 2019-09-28

## 2019-09-28 RX ADMIN — CEFTRIAXONE 1000 MILLIGRAM(S): 500 INJECTION, POWDER, FOR SOLUTION INTRAMUSCULAR; INTRAVENOUS at 10:53

## 2019-09-28 NOTE — ED PROVIDER NOTE - CARE PLAN
Principal Discharge DX:	Urinary retention Principal Discharge DX:	Urinary retention  Secondary Diagnosis:	Urinary tract infection without hematuria, site unspecified

## 2019-09-28 NOTE — ED PROVIDER NOTE - OBJECTIVE STATEMENT
53 year old male with history of BPH and subsequent urinary retention (seen by Dr. Villarreal in the past) presents to ED with concern for urinary retention over the past several hours.  Patient states he thinks his prostate is "aggregated - that's what happens, and I need a valdez."  He denies urethral discharge, fever, chills, urinary symptoms otherwise or any additional acute complaints or concerns at this time.

## 2019-09-28 NOTE — ED PROVIDER NOTE - CARE PROVIDER_API CALL
Aden Villarreal)  Urology  [address], [facility], [place] [address]  Phone: [phone]  Fax: [phone]  Follow Up Time:

## 2019-09-28 NOTE — ED PROVIDER NOTE - CLINICAL SUMMARY MEDICAL DECISION MAKING FREE TEXT BOX
Patient in ED w concern for urinary retention.  Requesting valdez - history of similar symptoms which he attributes to "prostate being aggravated."  No fever or chills.  Valdez catheter placed and UA reviewed. Patient in ED w concern for urinary retention.  Requesting valdez - history of similar symptoms which he attributes to "prostate being aggravated."  No fever or chills.  Valdez catheter placed and UA reviewed.  Nitrite positive UTI noted and initial dose of IV abx administered in ED.  Patient is aware he will need to fill rx for oral abx and is advised to follow up with his urologist in 1-2 days for eval of valdez removal and to ensure UTI is clearing.  Patient is instructed to return to ED immediately should his symptoms worsen or if he has any concern prior to this recommended follow up.  Patient is aware of plan and verbalizes his understanding.  Will discharge home at this time.

## 2019-09-28 NOTE — ED ADULT NURSE NOTE - PMH
i need to see the patient sooner than scheduled appt based on lab results. May be in [place] this week.                Spoke to pt, he is scheduled for 6/27/17 at 1:00. Korin Ventura LPN     Benign prostatic hyperplasia, presence of lower urinary tract symptoms unspecified, unspecified morphology

## 2019-09-28 NOTE — ED PROVIDER NOTE - NSFOLLOWUPINSTRUCTIONS_ED_ALL_ED_FT
Please follow up with your primary physician in 1-2 days for re evaluation.  Please return to ER immediately should your symptoms worsen or if you have any concern prior to this recommended follow up.    Indwelling Urinary Catheter Care, Adult  An indwelling urinary catheter is a thin tube that is put into your bladder. The tube helps to drain pee (urine) out of your body. The tube goes in through your urethra. Your urethra is where pee comes out of your body. Your pee will come out through the catheter, then it will go into a bag (drainage bag).    Take good care of your catheter so it will work well.    How to wear your catheter and bag  Supplies needed     Sticky tape (adhesive tape) or a leg strap.  Alcohol wipe or soap and water (if you use tape).  A clean towel (if you use tape).  Large overnight bag.  Smaller bag (leg bag).  Wearing your catheter     Attach your catheter to your leg with tape or a leg strap.  Make sure the catheter is not pulled tight.  If a leg strap gets wet, take it off and put on a dry strap.  If you use tape to hold the bag on your leg:  Use an alcohol wipe or soap and water to wash your skin where the tape made it sticky before.  Use a clean towel to pat-dry that skin.  Use new tape to make the bag stay on your leg.  Wearing your bags    You should have been given a large overnight bag.  You may wear the overnight bag in the day or night.  Always have the overnight bag lower than your bladder. Do not let the bag touch the floor.  Before you go to sleep, put a clean plastic bag in a wastebasket. Then hang the overnight bag inside the wastebasket.  You should also have a smaller leg bag that fits under your clothes.  Always wear the leg bag below your knee.  Do not wear your leg bag at night.  How to care for your skin and catheter  Supplies needed     A clean washcloth.  Water and mild soap.  A clean towel.  Caring for your skin and catheter     Image Image   Clean the skin around your catheter every day:  Wash your hands with soap and water.  Wet a clean washcloth in warm water and mild soap.  Clean the skin around your urethra.  If you are female:  Gently spread the folds of skin around your vagina (labia).  With the washcloth in your other hand, wipe the inner side of your labia on each side. Wipe from front to back.  If you are male:  Pull back any skin that covers the end of your penis (foreskin).  With the washcloth in your other hand, wipe your penis in small circles. Start wiping at the tip of your penis, then move away from the catheter.  With your free hand, hold the catheter close to where it goes into your body.  Keep holding the catheter during cleaning so it does not get pulled out.  With the washcloth in your other hand, clean the catheter.  Only wipe downward on the catheter.  Do not wipe upward toward your body. Doing this may push germs into your urethra and cause infection.  Use a clean towel to pat-dry the catheter and the skin around it. Make sure to wipe off all soap.  Wash your hands with soap and water.  Shower every day. Do not take baths.  Do not use cream, ointment, or lotion on the area where the catheter goes into your body, unless your doctor tells you to.  Do not use powders, sprays, or lotions on your genital area.  Check your skin around the catheter every day for signs of infection. Check for:  Redness, swelling, or pain.  Fluid or blood.  Warmth.  Pus or a bad smell.  How to empty the bag  Supplies needed     Rubbing alcohol.  Gauze pad or cotton ball.  Tape or a leg strap.  Emptying the bag     Pour the pee out of your bag when it is ?–½ full, or at least 2–3 times a day. Do this for your overnight bag and your leg bag. Do not clean your bags unless your doctor tells you to.  Wash your hands with soap and water.    Separate (detach) the bag from your leg.    Hold the bag over the toilet or a clean pail. Keep the bag lower than your hips and bladder. This is so the pee (urine) does not go back into the tube.    Open the pour spout. It is at the bottom of the bag.    Empty the pee into the toilet or pail. Do not let the pour spout touch any surface.    Put rubbing alcohol on a gauze pad or cotton ball.    Use the gauze pad or cotton ball to clean the pour spout.    Close the pour spout.    Attach the bag to your leg with tape or a leg strap.    Wash your hands with soap and water.    How to change the bag  Supplies needed     Alcohol wipes.  A clean bag.  Tape or a leg strap.  Changing the bag     Replace your bag with a clean bag once a month. If it starts to leak, smell bad, or look dirty, change it sooner.  Wash your hands with soap and water.    Separate the dirty bag from your leg.    Pinch the catheter with your fingers so that pee does not spill out.    Separate the catheter tube from the bag tube where these tubes connect (at the connection valve). Do not let the tubes touch any surface.    Clean the end of the catheter tube with an alcohol wipe. Use a different alcohol wipe to clean the end of the bag tube.    Connect the catheter tube to the tube of the clean bag.    Attach the clean bag to your leg with tape or a leg strap. Do not make the bag tight on your leg.    Wash your hands with soap and water.    General rules  Image   Never pull on your catheter. Never try to take it out. Doing that can hurt you.  Always wash your hands before and after you touch your catheter or bag. Use a mild, fragrance-free soap. If you do not have soap and water, use hand .  Always make sure there are no twists or bends (kinks) in the catheter tube.  Always make sure there are no leaks in the catheter or bag.  Drink enough fluid to keep your pee pale yellow.  Do not take baths, swim, or use a hot tub.  If you are female, wipe from front to back after you poop (have a bowel movement).  Contact a doctor if:  Your pee is cloudy.  Your pee smells worse than usual.  Your catheter gets clogged.  Your catheter leaks.  Your bladder feels full.  Get help right away if:  You have redness, swelling, or pain where the catheter goes into your body.  You have fluid, blood, pus, or a bad smell coming from the area where the catheter goes into your body.  Your skin feels warm where the catheter goes into your body.  You have a fever.  You have pain in your:  Belly (abdomen).  Legs.  Lower back.  Bladder.  You see blood in the catheter.  Your pee is pink or red.  You feel sick to your stomach (nauseous).  You throw up (vomit).  You have chills.  Your pee is not draining into the bag.  Your catheter gets pulled out.  Summary  An indwelling urinary catheter is a thin tube that is placed into the bladder to help drain pee (urine) out of the body.   The catheter is placed into the part of the body that drains pee from the bladder (urethra).  Taking good care of your catheter will keep it working properly and help prevent problems.  Always wash your hands before and after touching your catheter or bag.  Never pull on your catheter or try to take it out.  This information is not intended to replace advice given to you by your health care provider. Make sure you discuss any questions you have with your health care provider.    Document Released: 04/14/2014 Document Revised: 08/03/2018 Document Reviewed: 08/03/2018    Urinary Tract Infection, Adult  ImageA urinary tract infection (UTI) is an infection of any part of the urinary tract. The urinary tract includes the:  Kidneys.  Ureters.  Bladder.  Urethra.  These organs make, store, and get rid of pee (urine) in the body.    Follow these instructions at home:  Image   Take over-the-counter and prescription medicines only as told by your doctor.  If you were prescribed an antibiotic medicine, take it as told by your doctor. Do not stop taking it even if you start to feel better.  Drink enough fluid to keep your pee (urine) pale yellow. For most people, this is 6–10 glasses of water each day.  Keep all follow-up visits as told by your doctor. This is important.  Make sure you:  Empty your bladder often and completely. Do not hold pee for long periods of time.  Empty your bladder after sex.  Wipe from front to back after a bowel movement if you are female. Use each tissue one time when you wipe.  Contact a doctor if:  Your symptoms do not get better after 1–2 days.  Your symptoms go away and then come back.  Get help right away if:  You have very bad pain in your back.  You have very bad pain in your lower belly (abdomen).  You have a fever.  You are sick to your stomach (nauseous).  You are throwing up (vomiting).  Summary  A urinary tract infection (UTI) is an infection of any part of the urinary tract.  If you were prescribed an antibiotic medicine, take it as told by your doctor. Do not stop taking it even if you start to feel better.  Drink enough fluid to keep your pee (urine) pale yellow.  This information is not intended to replace advice given to you by your health care provider. Make sure you discuss any questions you have with your health care provider.    Document Released: 06/05/2009 Document Revised: 02/12/2019 Document Reviewed: 11/07/2016

## 2019-09-28 NOTE — ED PROVIDER NOTE - PATIENT PORTAL LINK FT
You can access the FollowMyHealth Patient Portal offered by [facility] by registering at the following website: [email or web address]. By joining [employer]’s FollowMyHealth portal, you will also be able to view your health information using other applications (apps) compatible with our system.

## 2019-09-28 NOTE — ED ADULT NURSE NOTE - CHPI ED NUR SYMPTOMS NEG
no abdominal distension/no chills/no fever/no blood in stool/no nausea/no diarrhea/no dysuria/no hematuria

## 2019-09-28 NOTE — ED ADULT NURSE NOTE - NSIMPLEMENTINTERV_GEN_ALL_ED
Implemented All Universal Safety Interventions:  [place] to call system. Call bell, personal items and telephone within reach. Instruct patient to call for assistance. Room bathroom lighting operational. Non-slip footwear when patient is off stretcher. Physically safe environment: no spills, clutter or unnecessary equipment. Stretcher in lowest position, wheels locked, appropriate side rails in place.

## 2019-10-02 DIAGNOSIS — N39.0 URINARY TRACT INFECTION, SITE NOT SPECIFIED: ICD-10-CM

## 2019-10-02 DIAGNOSIS — R33.9 RETENTION OF URINE, UNSPECIFIED: ICD-10-CM

## 2019-10-03 DIAGNOSIS — N40.0 BENIGN PROSTATIC HYPERPLASIA WITHOUT LOWER URINARY TRACT SYMPTOMS: ICD-10-CM

## 2019-10-08 NOTE — ED PROVIDER NOTE - EYES, MLM
Assumed care of pt, report received from Peggy TOLEDO. Whiteboard updated. CPS S.W. at bedside.    Clear bilaterally, pupils equal, round and reactive to light.

## 2019-10-09 ENCOUNTER — EMERGENCY (EMERGENCY)
Facility: HOSPITAL | Age: 54
LOS: 1 days | Discharge: HOME | End: 2019-10-09
Admitting: EMERGENCY MEDICINE
Payer: SELF-PAY

## 2019-10-09 VITALS
RESPIRATION RATE: 18 BRPM | DIASTOLIC BLOOD PRESSURE: 74 MMHG | TEMPERATURE: 98 F | OXYGEN SATURATION: 100 % | SYSTOLIC BLOOD PRESSURE: 132 MMHG | HEART RATE: 81 BPM | HEIGHT: 73 IN | WEIGHT: 179.9 LBS

## 2019-10-09 DIAGNOSIS — N40.0 BENIGN PROSTATIC HYPERPLASIA WITHOUT LOWER URINARY TRACT SYMPTOMS: ICD-10-CM

## 2019-10-09 PROCEDURE — 99282 EMERGENCY DEPT VISIT SF MDM: CPT

## 2019-10-09 NOTE — ED ADULT NURSE NOTE - OBJECTIVE STATEMENT
Pt presents to ED for valdez catheter removal. Pt reports he was seen ~1 week ago in [facility] ED and had valdez placed, pt states "whenever I drink my prostate starts to act up so they put a valdez in," pt presents today requesting removal. Pt reports he was unable to see urology, states "I'm homeless and I need to work so usually you guys work with me." Pt denies f/c, abd pain, penile pain, blood in drainage bag. Pt presents in NAD speaking full sentences ambulatory through triage.

## 2019-10-09 NOTE — ED ADULT TRIAGE NOTE - CHIEF COMPLAINT QUOTE
Patient came for valdez catheter removal , was placed 1 week ago . denies any pain . History of BPH .

## 2019-10-09 NOTE — ED PROVIDER NOTE - PATIENT PORTAL LINK FT
You can access the FollowMyHealth Patient Portal offered by [facility] by registering at the following website: [email or web address]. By joining [employer]’s FollowMyHealth portal, you will also be able to view your health information using other applications (apps) compatible with our system.

## 2019-10-09 NOTE — ED PROVIDER NOTE - OBJECTIVE STATEMENT
53 year old male with history of BPH and subsequent urinary retention (seen by Dr. Villarreal in the past) presents to ED for valdez catheter removal. Patient was in ED approximately 11 days ago w/complaints of urinary retention and had valdez placed then. He was encouraged to follow up with urology but states he is too busy to do that. Denies any abdominal or flank pain, fever, chills, vomiting or hematuria.

## 2019-10-09 NOTE — ED PROVIDER NOTE - CARE PROVIDER_API CALL
Aden Villarreal)  Urology  [address], [facility], [place], operated by [facility], [place] [address]  Phone: [phone]  Fax: [phone]  Follow Up Time:

## 2019-10-09 NOTE — ED PROVIDER NOTE - CLINICAL SUMMARY MEDICAL DECISION MAKING FREE TEXT BOX
53 year old male with history of BPH and subsequent urinary retention (seen by Dr. Villarreal in the past) presents to ED for valdez catheter removal. Patient was in ED approximately 11 days ago w/complaints of urinary retention and had valdez placed then. He was encouraged to follow up with urology but states he is too busy to do that. Denies any abdominal or flank pain, fever, chills, vomiting or hematuria.    Valdez removed by RN without issue. Patient passed trial of void period and urinated without difficulty in ER.     I have discussed the discharge plan with the patient. The patient agrees with the plan, as discussed.  The patient understands Emergency Department diagnosis is a preliminary diagnosis often based on limited information and that the patient must adhere to the follow-up plan as discussed.  The patient understands that if the symptoms worsen or if prescribed medications do not have the desired/planned effect that the patient must/may return to the closest Emergency Department at any time for further evaluation and treatment.

## 2019-10-09 NOTE — ED PROVIDER NOTE - NSFOLLOWUPINSTRUCTIONS_ED_ALL_ED_FT
Jacques Catheter Removal    WHAT YOU NEED TO KNOW:    Your Jacques catheter was removed because you no longer need it. You may have certain urinary symptoms for up to 48 hours after your Jacques catheter is removed. These include urinary urgency and frequency. Urinary urgency means you feel such a strong need to urinate that you have trouble waiting. You may also feel discomfort in your bladder. Urinary frequency means you need to urinate many times during the day. You may also have pain while urinating, or your bladder may not empty completely when you urinate.   DISCHARGE INSTRUCTIONS:    Call your urologist if:     You do not urinate at all within 8 hours of your catheter removal.    You have a fever.      You are leaking urine.      You have urinary urgency, frequency, or trouble urinating for more than 48 hours after catheter removal.      You have pain while you urinate, or you feel like your bladder is not emptying completely for more than 48 hours after catheter removal.      You see blood in your urine.      Your abdomen is bloated.      You have questions or concerns about your condition or care.    Medicines:     Antibiotics may be given if you had surgery on your urinary tract.      Take your medicine as directed. Contact your healthcare provider if you think your medicine is not helping or if you have side effects. Tell him or her if you are allergic to any medicine. Keep a list of the medicines, vitamins, and herbs you take. Include the amounts, and when and why you take them. Bring the list or the pill bottles to follow-up visits. Carry your medicine list with you in case of an emergency.    Drink liquids as directed: You may be asked to drink plenty of liquids after the removal of your catheter. This will help to flush out bacteria that can build up while using a Jacques catheter.     Follow up with your doctor or urologist as directed: Write down your questions so you remember to ask them during your visits.

## 2019-10-10 ENCOUNTER — INBOUND DOCUMENT (OUTPATIENT)
Age: 54
End: 2019-10-10

## 2019-10-10 ENCOUNTER — EMERGENCY (EMERGENCY)
Facility: HOSPITAL | Age: 54
LOS: 1 days | Discharge: HOME | End: 2019-10-10
Attending: EMERGENCY MEDICINE | Admitting: EMERGENCY MEDICINE
Payer: SELF-PAY

## 2019-10-10 VITALS
HEIGHT: 72 IN | TEMPERATURE: 98 F | DIASTOLIC BLOOD PRESSURE: 91 MMHG | RESPIRATION RATE: 18 BRPM | WEIGHT: 169.09 LBS | SYSTOLIC BLOOD PRESSURE: 164 MMHG | HEART RATE: 92 BPM | OXYGEN SATURATION: 97 %

## 2019-10-10 PROCEDURE — 99283 EMERGENCY DEPT VISIT LOW MDM: CPT | Mod: 25

## 2019-10-10 PROCEDURE — 99283 EMERGENCY DEPT VISIT LOW MDM: CPT

## 2019-10-10 PROCEDURE — 51702 INSERT TEMP BLADDER CATH: CPT

## 2019-10-10 PROCEDURE — 87186 SC STD MICRODIL/AGAR DIL: CPT

## 2019-10-10 PROCEDURE — 81001 URINALYSIS AUTO W/SCOPE: CPT

## 2019-10-10 PROCEDURE — 87086 URINE CULTURE/COLONY COUNT: CPT

## 2019-10-10 NOTE — ED PROVIDER NOTE - CLINICAL SUMMARY MEDICAL DECISION MAKING FREE TEXT BOX
53M PMH BPH, urinary retention, p/w concern over urinary retention. Pt had valdez removed yesterday and since then has been urinating normally. Last normal urine this morning. No dysuria/hematuria. However, has been unable to urinate since then, increasing suprapubic pain. Feels similar to prior retention. No other systemic symptoms. Vitals wnl, exam as above.  ddx: Urinary retention.  Valdez placed w/ ~600cc clear yellow urine return.  Awaiting UA, reassessment.   Discussed importance of outpt pmd/gu f/u.

## 2019-10-10 NOTE — ED PROVIDER NOTE - NSFOLLOWUPINSTRUCTIONS_ED_ALL_ED_FT
Can take tylenol every 6hrs as needed for pain.  Stay well hydrated.  Return for fevers, persistent vomit, uncontrolled pain, worsening breathing, worsening lightheaded, unable to urinate, spreading redness.  Follow up with primary doctor within 1-2 days.   Follow up with urologist within 2 days to assess for valdez catheter removal (prolonged use can cause infections and other complications). Can call [phone] to schedule appointment.     What is urinary retention?   Urinary retention is a condition that develops when your bladder does not empty completely when you urinate.    What causes urinary retention?     An enlarged prostate  Blockages, such as a stone, growth, or narrowing of your urethra  A weak bladder muscle  Nerve damage from diabetes, stroke, or spinal cord injury  Bladder diverticula, which are pockets of urine that form in your bladder and do not empty  Certain medicines, such as narcotics, antihistamines, or antidepressants    What are the signs and symptoms of urinary retention?     Frequent urination, or the urge to urinate right after you finish  An urge to urinate, but your urine does not come out or dribbles out slowly and weakly  Frequent urine leaks that happen during the day or while you sleep  Pain or pressure when you urinate  Pain or stiffness in your abdomen, lower back, hips, or upper thighs  Blood in your urine    How is urinary retention diagnosed? Your healthcare provider will ask about your health history and the medicines you take. He will press or tap on your lower abdomen. You may need any of the following tests:   A digital rectal exam is when healthcare providers carefully feel the size of your prostate.  A post void residual test will show how much urine is left in your bladder after you urinate. You will be asked to urinate and then healthcare providers will use a small ultrasound machine to check how much urine is left in your bladder.  Blood or urine tests may show infection or prostate specific antigen (PSA) levels. PSA may be elevated in prostate cancer.  An ultrasound uses sound waves to show pictures on a monitor. An ultrasound may be done to show bladder stones, infection, or other problems.  A CT scan, or CAT scan, is a type of x-ray that is taken of your prostate, kidneys, and bladder. The pictures may show what is causing your urinary retention. You may be given a dye before the pictures are taken to help healthcare providers see the pictures better. Tell the healthcare provider if you have ever had an allergic reaction to contrast dye.    How is urinary retention treated?     A Valdez catheter is a tube put into your bladder to drain urine into a bag. Keep the bag below your waist. This will prevent urine from flowing back into your bladder and causing an infection or other problems. Also, keep the tube free of kinks so the urine will drain properly. Do not pull on the catheter. This can cause pain and bleeding, and may cause the catheter to come out.     Medicines can help decrease the size of your prostate, fight infection, and help you urinate more easily.  Surgery may be needed to treat the condition that is causing your urinary retention.     When should I contact my healthcare provider?     You have a fever.  You have pain when you urinate.  You have blood in your urine.  You have problems with your catheter.  You have questions or concerns about your condition or care.    When should I seek immediate care or call 911?   You have severe abdominal pain.  You are breathing faster than usual.  Your heartbeat is faster than usual.  Your face, hands, feet, or ankles are swollen.

## 2019-10-10 NOTE — ED ADULT NURSE NOTE - OBJECTIVE STATEMENT
53 year old M patient, A+OX3, with c/o of 10 AM unable to urinate.  Had his catheter taken out 2 days ago at his urology office.  C/o of abd pain.  No distress noted.

## 2019-10-10 NOTE — ED PROVIDER NOTE - PATIENT PORTAL LINK FT
You can access the FollowMyHealth Patient Portal offered by [facility] by registering at the following website: [email or web address]. By joining [employer]’s FollowMyHealth portal, you will also be able to view your health information using other applications (apps) compatible with our system.

## 2019-10-10 NOTE — ED PROVIDER NOTE - OBJECTIVE STATEMENT
53M PMH BPH, urinary retention, p/w concern over urinary retention. Pt had valdez removed yesterday and since then has been urinating normally. Last normal urine this morning. No dysuria/hematuria. However, has been unable to urinate since then, increasing suprapubic pain. Feels similar to prior retention. Denies f/c, NVD, black/bloody stool, other abd pain, focal weakness/numbness, lightheadedness, back pain, testicular/penile pain, rashes, SOB/CP, URI symptoms. Normal PO intake, normal BMs.

## 2019-10-15 DIAGNOSIS — R33.9 RETENTION OF URINE, UNSPECIFIED: ICD-10-CM

## 2019-11-23 ENCOUNTER — EMERGENCY (EMERGENCY)
Facility: HOSPITAL | Age: 54
LOS: 1 days | Discharge: HOME | End: 2019-11-23
Attending: EMERGENCY MEDICINE | Admitting: EMERGENCY MEDICINE
Payer: SELF-PAY

## 2019-11-23 VITALS
WEIGHT: 190.04 LBS | DIASTOLIC BLOOD PRESSURE: 79 MMHG | RESPIRATION RATE: 18 BRPM | HEART RATE: 69 BPM | SYSTOLIC BLOOD PRESSURE: 127 MMHG | TEMPERATURE: 97 F

## 2019-11-23 DIAGNOSIS — N40.0 BENIGN PROSTATIC HYPERPLASIA WITHOUT LOWER URINARY TRACT SYMPTOMS: ICD-10-CM

## 2019-11-23 DIAGNOSIS — Z96.0 PRESENCE OF UROGENITAL IMPLANTS: ICD-10-CM
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
## 2019-11-23 DIAGNOSIS — T83.098A OTHER MECHANICAL COMPLICATION OF OTHER URINARY CATHETER, INITIAL ENCOUNTER: ICD-10-CM

## 2019-11-23 PROCEDURE — 99283 EMERGENCY DEPT VISIT LOW MDM: CPT

## 2019-11-23 PROCEDURE — 99282 EMERGENCY DEPT VISIT SF MDM: CPT

## 2019-11-23 RX ORDER — TAMSULOSIN HYDROCHLORIDE 0.4 MG/1
1 CAPSULE ORAL
Qty: 30 | Refills: 0
Start: 2019-11-23 | End: 2019-12-22

## 2019-11-23 RX ORDER — CEFUROXIME AXETIL 250 MG
1 TABLET ORAL
Qty: 14 | Refills: 0
Start: 2019-11-23 | End: 2019-11-29

## 2019-11-23 NOTE — ED PROVIDER NOTE - CARE PLAN
Principal Discharge DX:	Benign prostatic hyperplasia, presence of lower urinary tract symptoms unspecified, unspecified morphology  Secondary Diagnosis:	Indwelling catheter present on admission

## 2019-11-23 NOTE — ED PROVIDER NOTE - OBJECTIVE STATEMENT
52 yo male with hx of BPH with indwelling valdez catheter x 3 weeks - valdez was placed in ED at  [facility] 3 weeks ago- did not follow- up for removal-  no fevers or chills no N/V  kimberley po well - requesting TOV and valdez catheter removal - not taking any recent antibiotics since placement

## 2019-11-23 NOTE — ED ADULT NURSE NOTE - OBJECTIVE STATEMENT
Patient is a 54yo male stating he need his Jacques catheter removed. Patient states it was placed 3 wks ago r/t BPH and he never followed up with urology. Denies abdominal pain, fevers, chills.

## 2019-11-23 NOTE — ED ADULT NURSE REASSESSMENT NOTE - NS ED NURSE REASSESS COMMENT FT1
Jacques catheter removed with minimal difficulty. Patient urinating without difficulty. MD Domingo aware.

## 2019-11-23 NOTE — ED PROVIDER NOTE - CARE PROVIDER_API CALL
Aden Villarreal)  Urology  [address], [facility]  [place], [place] [address]  Phone: [phone]  Fax: [phone]  Follow Up Time: 1-3 Days

## 2019-11-23 NOTE — ED PROVIDER NOTE - CLINICAL SUMMARY MEDICAL DECISION MAKING FREE TEXT BOX
54 yo  homeless male valdez in x 6 weeks since last ED visit - unable to follow up with   will dc  courtney d/w pt he has 8r TOV  will rx flomax 52 yo  homeless male valdez in x 6 weeks since last ED visit - unable to follow up with   - urine clear  no signs of sepsis   will dc  valdez d/w pt he has 8r TOV  will rx flomax

## 2019-11-23 NOTE — ED PROVIDER NOTE - PATIENT PORTAL LINK FT
You can access the FollowMyHealth Patient Portal offered by [facility] by registering at the following website: [email or web address]. By joining [employer]’s FollowMyHealth portal, you will also be able to view your health information using other applications (apps) compatible with our system.

## 2019-11-23 NOTE — ED ADULT NURSE NOTE - OTHER COMPLAINTS
Pt 52 y/o male came in requesting valdez catheter changed or removed, reports having it for 3weeks, did not follow up with urology.

## 2019-11-23 NOTE — ED ADULT TRIAGE NOTE - OTHER COMPLAINTS
Pt 54 y/o male came in requesting valdez catheter changed or removed, reports having it for 3weeks, did not follow up with urology.

## 2019-11-24 ENCOUNTER — EMERGENCY (EMERGENCY)
Facility: HOSPITAL | Age: 54
LOS: 1 days | Discharge: HOME | End: 2019-11-24
Attending: EMERGENCY MEDICINE | Admitting: EMERGENCY MEDICINE
Payer: SELF-PAY

## 2019-11-24 VITALS
RESPIRATION RATE: 16 BRPM | TEMPERATURE: 97 F | DIASTOLIC BLOOD PRESSURE: 91 MMHG | HEART RATE: 98 BPM | HEIGHT: 73 IN | OXYGEN SATURATION: 96 % | SYSTOLIC BLOOD PRESSURE: 138 MMHG | WEIGHT: 190.04 LBS

## 2019-11-24 VITALS
RESPIRATION RATE: 16 BRPM | HEART RATE: 65 BPM | DIASTOLIC BLOOD PRESSURE: 64 MMHG | TEMPERATURE: 98 F | OXYGEN SATURATION: 99 % | SYSTOLIC BLOOD PRESSURE: 120 MMHG

## 2019-11-24 DIAGNOSIS — N39.0 URINARY TRACT INFECTION, SITE NOT SPECIFIED: ICD-10-CM

## 2019-11-24 DIAGNOSIS — R33.9 RETENTION OF URINE, UNSPECIFIED: ICD-10-CM

## 2019-11-24 DIAGNOSIS — N40.1 BENIGN PROSTATIC HYPERPLASIA WITH LOWER URINARY TRACT SYMPTOMS: ICD-10-CM

## 2019-11-24 LAB
ALBUMIN SERPL ELPH-MCNC: 4.5 G/DL — SIGNIFICANT CHANGE UP (ref 3.3–5)
ALP SERPL-CCNC: 60 U/L — SIGNIFICANT CHANGE UP (ref 40–120)
ALT FLD-CCNC: 12 U/L — SIGNIFICANT CHANGE UP (ref 10–45)
ANION GAP SERPL CALC-SCNC: 8 MMOL/L — SIGNIFICANT CHANGE UP (ref 5–17)
APPEARANCE UR: ABNORMAL
AST SERPL-CCNC: 16 U/L — SIGNIFICANT CHANGE UP (ref 10–40)
BASOPHILS # BLD AUTO: 0.03 K/UL — SIGNIFICANT CHANGE UP (ref 0–0.2)
BASOPHILS NFR BLD AUTO: 0.5 % — SIGNIFICANT CHANGE UP (ref 0–2)
BILIRUB SERPL-MCNC: 1.6 MG/DL — HIGH (ref 0.2–1.2)
BILIRUB UR-MCNC: NEGATIVE — SIGNIFICANT CHANGE UP
BUN SERPL-MCNC: 16 MG/DL — SIGNIFICANT CHANGE UP (ref 7–23)
CALCIUM SERPL-MCNC: 9.6 MG/DL — SIGNIFICANT CHANGE UP (ref 8.4–10.5)
CHLORIDE SERPL-SCNC: 101 MMOL/L — SIGNIFICANT CHANGE UP (ref 96–108)
CO2 SERPL-SCNC: 29 MMOL/L — SIGNIFICANT CHANGE UP (ref 22–31)
COLOR SPEC: YELLOW — SIGNIFICANT CHANGE UP
CREAT SERPL-MCNC: 0.88 MG/DL — SIGNIFICANT CHANGE UP (ref 0.5–1.3)
DIFF PNL FLD: ABNORMAL
EOSINOPHIL # BLD AUTO: 0.02 K/UL — SIGNIFICANT CHANGE UP (ref 0–0.5)
EOSINOPHIL NFR BLD AUTO: 0.3 % — SIGNIFICANT CHANGE UP (ref 0–6)
GLUCOSE SERPL-MCNC: 111 MG/DL — HIGH (ref 70–99)
GLUCOSE UR QL: NEGATIVE — SIGNIFICANT CHANGE UP
HCT VFR BLD CALC: 39.9 % — SIGNIFICANT CHANGE UP (ref 39–50)
HGB BLD-MCNC: 13 G/DL — SIGNIFICANT CHANGE UP (ref 13–17)
IMM GRANULOCYTES NFR BLD AUTO: 0.2 % — SIGNIFICANT CHANGE UP (ref 0–1.5)
KETONES UR-MCNC: NEGATIVE — SIGNIFICANT CHANGE UP
LEUKOCYTE ESTERASE UR-ACNC: ABNORMAL
LYMPHOCYTES # BLD AUTO: 0.77 K/UL — LOW (ref 1–3.3)
LYMPHOCYTES # BLD AUTO: 11.7 % — LOW (ref 13–44)
MCHC RBC-ENTMCNC: 29.4 PG — SIGNIFICANT CHANGE UP (ref 27–34)
MCHC RBC-ENTMCNC: 32.6 GM/DL — SIGNIFICANT CHANGE UP (ref 32–36)
MCV RBC AUTO: 90.3 FL — SIGNIFICANT CHANGE UP (ref 80–100)
MONOCYTES # BLD AUTO: 0.4 K/UL — SIGNIFICANT CHANGE UP (ref 0–0.9)
MONOCYTES NFR BLD AUTO: 6.1 % — SIGNIFICANT CHANGE UP (ref 2–14)
NEUTROPHILS # BLD AUTO: 5.36 K/UL — SIGNIFICANT CHANGE UP (ref 1.8–7.4)
NEUTROPHILS NFR BLD AUTO: 81.2 % — HIGH (ref 43–77)
NITRITE UR-MCNC: POSITIVE
NRBC # BLD: 0 /100 WBCS — SIGNIFICANT CHANGE UP (ref 0–0)
PH UR: 6 — SIGNIFICANT CHANGE UP (ref 5–8)
PLATELET # BLD AUTO: 180 K/UL — SIGNIFICANT CHANGE UP (ref 150–400)
POTASSIUM SERPL-MCNC: 4.5 MMOL/L — SIGNIFICANT CHANGE UP (ref 3.5–5.3)
POTASSIUM SERPL-SCNC: 4.5 MMOL/L — SIGNIFICANT CHANGE UP (ref 3.5–5.3)
PROT SERPL-MCNC: 7.8 G/DL — SIGNIFICANT CHANGE UP (ref 6–8.3)
PROT UR-MCNC: NEGATIVE MG/DL — SIGNIFICANT CHANGE UP
RBC # BLD: 4.42 M/UL — SIGNIFICANT CHANGE UP (ref 4.2–5.8)
RBC # FLD: 13.1 % — SIGNIFICANT CHANGE UP (ref 10.3–14.5)
SODIUM SERPL-SCNC: 138 MMOL/L — SIGNIFICANT CHANGE UP (ref 135–145)
SP GR SPEC: 1.01 — SIGNIFICANT CHANGE UP (ref 1–1.03)
UROBILINOGEN FLD QL: 0.2 E.U./DL — SIGNIFICANT CHANGE UP
WBC # BLD: 6.59 K/UL — SIGNIFICANT CHANGE UP (ref 3.8–10.5)
WBC # FLD AUTO: 6.59 K/UL — SIGNIFICANT CHANGE UP (ref 3.8–10.5)

## 2019-11-24 PROCEDURE — 87086 URINE CULTURE/COLONY COUNT: CPT

## 2019-11-24 PROCEDURE — 99284 EMERGENCY DEPT VISIT MOD MDM: CPT | Mod: 25

## 2019-11-24 PROCEDURE — 99053 MED SERV 10PM-8AM 24 HR FAC: CPT

## 2019-11-24 PROCEDURE — 81001 URINALYSIS AUTO W/SCOPE: CPT

## 2019-11-24 PROCEDURE — 36415 COLL VENOUS BLD VENIPUNCTURE: CPT

## 2019-11-24 PROCEDURE — 85025 COMPLETE CBC W/AUTO DIFF WBC: CPT

## 2019-11-24 PROCEDURE — 99284 EMERGENCY DEPT VISIT MOD MDM: CPT

## 2019-11-24 PROCEDURE — 96374 THER/PROPH/DIAG INJ IV PUSH: CPT | Mod: XU

## 2019-11-24 PROCEDURE — 80053 COMPREHEN METABOLIC PANEL: CPT

## 2019-11-24 PROCEDURE — 51702 INSERT TEMP BLADDER CATH: CPT

## 2019-11-24 PROCEDURE — 87186 SC STD MICRODIL/AGAR DIL: CPT

## 2019-11-24 RX ORDER — SODIUM CHLORIDE 9 MG/ML
1000 INJECTION INTRAMUSCULAR; INTRAVENOUS; SUBCUTANEOUS ONCE
Refills: 0 | Status: COMPLETED | OUTPATIENT
Start: 2019-11-24 | End: 2019-11-24

## 2019-11-24 RX ORDER — TAMSULOSIN HYDROCHLORIDE 0.4 MG/1
0.4 CAPSULE ORAL ONCE
Refills: 0 | Status: COMPLETED | OUTPATIENT
Start: 2019-11-24 | End: 2019-11-24

## 2019-11-24 RX ORDER — CEFTRIAXONE 500 MG/1
1000 INJECTION, POWDER, FOR SOLUTION INTRAMUSCULAR; INTRAVENOUS ONCE
Refills: 0 | Status: COMPLETED | OUTPATIENT
Start: 2019-11-24 | End: 2019-11-24

## 2019-11-24 RX ORDER — TAMSULOSIN HYDROCHLORIDE 0.4 MG/1
1 CAPSULE ORAL
Qty: 14 | Refills: 0
Start: 2019-11-24 | End: 2019-12-07

## 2019-11-24 RX ORDER — CEFUROXIME AXETIL 250 MG
250 TABLET ORAL ONCE
Refills: 0 | Status: DISCONTINUED | OUTPATIENT
Start: 2019-11-24 | End: 2019-11-24

## 2019-11-24 RX ORDER — CEFPODOXIME PROXETIL 100 MG
1 TABLET ORAL
Qty: 28 | Refills: 0
Start: 2019-11-24 | End: 2019-12-07

## 2019-11-24 RX ADMIN — TAMSULOSIN HYDROCHLORIDE 0.4 MILLIGRAM(S): 0.4 CAPSULE ORAL at 09:32

## 2019-11-24 RX ADMIN — SODIUM CHLORIDE 1000 MILLILITER(S): 9 INJECTION INTRAMUSCULAR; INTRAVENOUS; SUBCUTANEOUS at 09:32

## 2019-11-24 RX ADMIN — CEFTRIAXONE 100 MILLIGRAM(S): 500 INJECTION, POWDER, FOR SOLUTION INTRAMUSCULAR; INTRAVENOUS at 09:32

## 2019-11-24 NOTE — ED PROVIDER NOTE - PATIENT PORTAL LINK FT
You can access the FollowMyHealth Patient Portal offered by [facility] by registering at the following website: [email or web address]. By joining [employer]’s FollowMyHealth portal, you will also be able to view your health information using other applications (apps) compatible with our system.

## 2019-11-24 NOTE — ED PROVIDER NOTE - CARE PLAN
Principal Discharge DX:	Urinary retention due to benign prostatic hyperplasia  Secondary Diagnosis:	UTI (urinary tract infection)

## 2019-11-24 NOTE — ED ADULT NURSE REASSESSMENT NOTE - NS ED NURSE REASSESS COMMENT FT1
Attempted insertion of 16Fr valdez but was met with resistance and noted bleeding from urethral opening.  D/c'd attempt and made JINA Stevenson aware of event.  Per Dr. Palomares verbal order, second attempt was made with 14Fr Coude valdez catheter with success of insertion.  Bleeding was noted from urethral opening upon insertion but resolved shortly after insertion.  Patient reports relief upon completion of procedure.  Informed Day RN patient was endorsed to.

## 2019-11-24 NOTE — ED PROVIDER NOTE - CARE PROVIDER_API CALL
Aden Villarreal)  Urology  [address], [facility]  [place], [place] [address]  Phone: [phone]  Fax: [phone]  Follow Up Time: 4-6 Days

## 2019-11-24 NOTE — ED PROVIDER NOTE - NSFOLLOWUPINSTRUCTIONS_ED_ALL_ED_FT
Acute Urinary Retention, Male     Acute urinary retention is a condition in which a person is unable to pass urine. This can last for a short time or for a long time. If left untreated, it can result in kidney damage or other serious complications.  What are the causes?  This condition may be caused by:  Obstruction or narrowing of the tube that drains the bladder (urethra). This may be caused by surgery or problems with nearby organs, such as the prostate gland, which can press or squeeze the urethra.Problems with the nerves in the bladder. These can be caused by diseases, such as multiple sclerosis, or by spinal cord injuries.Certain medicines.Tumors in the area of the pelvis, bladder, or urethra.Diabetes.Degenerative cognitive conditions such as delirium or dementia.Bladder or urinary tract infection.Constipation.Blood in the urine (hematuria).Injury to the bladder or urethra. Psychological (psychogenic) conditions. Someone may hold his urine due to trauma or because he does not want to use the bathroom.What increases the risk?  This condition is more likely to develop in older men. As men age, their prostate may become larger and may start pressing or squeezing on the bladder or the urethra.  What are the signs or symptoms?  Symptoms of this condition include:  Trouble urinating.Pain in the lower abdomen.Symptoms usually come on slowly over a long period of time.  How is this diagnosed?  This condition is diagnosed based on a physical exam and a medical history. You may also have other tests, including:  An ultrasound of the bladder or kidneys or both.Blood tests.A urine analysis.Additional tests may be needed such as an MRI, kidney, or bladder function tests.How is this treated?  Treatment for this condition may include:  Medicines.Placing a thin, sterile tube (catheter) into the bladder to drain urine out of the body. This is called an indwelling urinary catheter. After being inserted, the catheter is held in place with a small balloon that is filled with sterile water. Urine drains from the catheter into a collection bag outside of the body.Behavioral therapy.Treatment for any underlying conditions.If needed, you may be treated in the hospital for kidney function problems or to manage other complications.Follow these instructions at home:  Take over-the-counter and prescription medicines only as told by your health care provider. Avoid certain medicines, such as decongestants, antihistamines, and some prescription medicines. Do not take any medicine unless your health care provider has approved.If you were given an indwelling urinary catheter, take care of it as told by your health care provider.Drink enough fluid to keep your urine clear or pale yellow.If you were prescribed an antibiotic, take it as told by your health care provider. Do not stop taking the antibiotic even if you start to feel better.Do not use any products that contain nicotine or tobacco, such as cigarettes and e-cigarettes. If you need help quitting, ask your health care provider.Monitor any changes in your symptoms. Tell your health care provider about any changes.If instructed, monitor your blood pressure at home. Report changes as told by your health care provider.Keep all follow-up visits as told by your health care provider. This is important.Contact a health care provider if:  You have uncomfortable bladder contractions that you cannot control (spasms) or you leak urine with the spasms.Get help right away if:  You have chills or fever.You have blood in your urine.You have a catheter and:  Your catheter stops draining urine.Your catheter falls out.Summary  Acute urinary retention is a condition in which a person is unable to pass urine. If left untreated, it can result in kidney damage or other serious complications.The cause of this condition may include an enlarged prostate. As men age, their prostate gland may become larger and may start pressing or squeezing on the bladder or the urethra.Treatment for this condition may include medicines and placement of an indwelling urinary catheter.Monitor any changes in your symptoms. Tell your health care provider about any changes.This information is not intended to replace advice given to you by your health care provider. Make sure you discuss any questions you have with your health care provider.    Document Released: 03/26/2002 Document Revised: 01/19/2018 Document Reviewed: 01/19/2018  [employer] Interactive Patient Education © 2019 [employer]

## 2019-11-24 NOTE — ED PROVIDER NOTE - ATTENDING CONTRIBUTION TO CARE
I discussed the plan of care of the patient directly with the PA and examined the patient while in the Emergency Department. I agree with the HPI and PE as documented by the PA.  Pt is a 54yo m, h/o bph, chronic urinary retention, seen in ed yesterday for complications for valdez leg bag; had catheter was dc'd w/ successful voiding trial. Pt was dx'd w/ uti and given rx for abx however did not  med as of yet. Returns today w/ urinary retention. No fever/ chills, flank pain, n/v, abd pain, hematuria.  + s1, s2, rrr. lungs cta b/l. Abd soft, nt/nd, no guarding/ rebound. No cvat.  + uti on urine. No kodi. Normal wbc. Pt given rocephin for uti. Will obtain urology consult. I discussed the plan of care of the patient directly with the PA and examined the patient while in the Emergency Department. I agree with the HPI and PE as documented by the PA.  Pt is a 52yo m, h/o bph, chronic urinary retention, seen in ed yesterday for complications for valdez leg bag; had catheter was dc'd w/ successful voiding trial. Pt was dx'd w/ uti and given rx for abx however did not  med as of yet. Returns today w/ urinary retention. No fever/ chills, flank pain, n/v, abd pain, hematuria.  WNWD m in nad. + s1, s2, rrr. lungs cta b/l. Abd soft, nt/nd, no guarding/ rebound. No cvat.  + uti on ua. No kodi with normal wbc on labwork. Pt given rocephin for uti. Will obtain urology consult.

## 2019-11-24 NOTE — CONSULT NOTE ADULT - SUBJECTIVE AND OBJECTIVE BOX
This is a 52 yo male that was seen here yesterday in the ER after having a Valdez placed weeks ago for urinary retention.  He states that when he came in yesterday it was because the valdez was dirty and he wanted to have it checked.  The cathter was removed and he was given a TOV which he passed. The patient  was released from the hospital  but now present being unable to void since yesterday.    The patient states he has had problems with his prostate for the past 5 years, but has been unable to address the problem because of many social issues.  He denies any fever, chills, nausea or vomiting.  He has no back pain ,dysuria or hematuria.  He admits to frequent trips to the bathroom every night (maybe 3-4 times), and states that he does not feel like he completely empties his bladder, nor is the flow/ stream strong.    Vital Signs Last 24 Hrs  T(C): 36.7 (2019 14:31), Max: 36.7 (2019 10:01)  T(F): 98.1 (2019 14:31), Max: 98.1 (2019 14:31)  HR: 65 (2019 14:31) (65 - 98)  BP: 120/64 (2019 14:31) (120/64 - 148/75)  BP(mean): --  RR: 16 (2019 14:31) (16 - 18)  I&O's Summary  SpO2: 99% (2019 14:31) (96% - 99%)                          13.0   6.59  )-----------( 180      ( 2019 09:22 )             39.9   11-24    138  |  101  |  16  ----------------------------<  111<H>  4.5   |  29  |  0.88    Ca    9.6      2019 09:22    TPro  7.8  /  Alb  4.5  /  TBili  1.6<H>  /  DBili  x   /  AST  16  /  ALT  12  /  AlkPhos  60  11-24      Urinalysis Basic - ( 2019 08:33 )    Color: Yellow / Appearance: SL Cloudy / S.015 / pH: x  Gluc: x / Ketone: NEGATIVE  / Bili: Negative / Urobili: 0.2 E.U./dL   Blood: x / Protein: NEGATIVE mg/dL / Nitrite: POSITIVE   Leuk Esterase: Moderate / RBC: > 10 /HPF / WBC > 10 /HPF   Sq Epi: x / Non Sq Epi: 0-5 /HPF / Bacteria: Many /HPF      PAST MEDICAL & SURGICAL HISTORY:  Benign prostatic hyperplasia, presence of lower urinary tract symptoms unspecified, unspecified morphology      No significant past surgical history      General: WN, WD alert, talkative  Abd: Soft This is a 54 yo male that was seen here yesterday in the ER after having a Valdez placed weeks ago for urinary retention.  He states that when he came in yesterday it was because the valdez was dirty and he wanted to have it checked.  The cathter was removed and he was given a TOV which he passed. The patient  was released from the hospital  but now present being unable to void since yesterday.    The patient states he has had problems with his prostate for the past 5 years, but has been unable to address the problem because of many social issues.  He denies any fever, chills, nausea or vomiting.  He has no back pain ,dysuria or hematuria.  He admits to frequent trips to the bathroom every night (maybe 3-4 times), and states that he does not feel like he completely empties his bladder, nor is the flow/ stream strong.    Vital Signs Last 24 Hrs  T(C): 36.7 (2019 14:31), Max: 36.7 (2019 10:01)  T(F): 98.1 (2019 14:31), Max: 98.1 (2019 14:31)  HR: 65 (2019 14:31) (65 - 98)  BP: 120/64 (2019 14:31) (120/64 - 148/75)  BP(mean): --  RR: 16 (2019 14:31) (16 - 18)  I&O's Summary  SpO2: 99% (2019 14:31) (96% - 99%)                          13.0   6.59  )-----------( 180      ( 2019 09:22 )             39.9   11-24    138  |  101  |  16  ----------------------------<  111<H>  4.5   |  29  |  0.88    Ca    9.6      2019 09:22    TPro  7.8  /  Alb  4.5  /  TBili  1.6<H>  /  DBili  x   /  AST  16  /  ALT  12  /  AlkPhos  60  11-24      Urinalysis Basic - ( 2019 08:33 )    Color: Yellow / Appearance: SL Cloudy / S.015 / pH: x  Gluc: x / Ketone: NEGATIVE  / Bili: Negative / Urobili: 0.2 E.U./dL   Blood: x / Protein: NEGATIVE mg/dL / Nitrite: POSITIVE   Leuk Esterase: Moderate / RBC: > 10 /HPF / WBC > 10 /HPF   Sq Epi: x / Non Sq Epi: 0-5 /HPF / Bacteria: Many /HPF      PAST MEDICAL & SURGICAL HISTORY:  Benign prostatic hyperplasia, presence of lower urinary tract symptoms unspecified, unspecified morphology      No significant past surgical history      General: WN, WD alert, talkative  Abd: Soft, NT, ND No CVA tenderness  : Valdez in place urine is clear

## 2019-11-24 NOTE — ED PROVIDER NOTE - CLINICAL SUMMARY MEDICAL DECISION MAKING FREE TEXT BOX
54 y/o male here with urinary retention and UTI. Non-septic appearing in NAD. Successful placement of valdez conducted by nurse with pt's improvement of discomfort. Given 1 g of rocephin for UTI. No fever/CVAT, do not suspect pyelonephritis. No REUBEN noted on labs. Urology consulted. 52 y/o male here with urinary retention and UTI. Non-septic appearing in NAD. Successful placement of valdez conducted by nurse with pt's improvement of discomfort. Given 1 g of rocephin for UTI. No fever/CVAT, do not suspect pyelonephritis. No REUBEN noted on labs. Urology consulted. CM and SW on board. Pt given prescription for flomax and cefpodoxime due to pt's inability to afford medication. Pt also given contact information for [facility] urology and [facility].

## 2019-11-24 NOTE — ED PROVIDER NOTE - OBJECTIVE STATEMENT
52 y/o male with a PMHx of BPH with frequent episodes of urinary retention is present here in the ED c/o inability to urinate. Pt was seen here yesterday in the ED in which he was having problems with his valdez catheter, which was discontinued with successful TOV. Pt was unable to  his prescription for his UTI. Pt reports since leaving yesterday he has not been able to urinate with increasing discomfort. He denies the following: fever, chills, back pain, abdominal pain, NV.

## 2019-11-24 NOTE — CONSULT NOTE ADULT - PROBLEM SELECTOR RECOMMENDATION 9
Discharge home with [facility]  Follow up with urologist this week   Flomax 0.4 mg daily  Continue antibiotics prescribed 11/23/2019 for UTI

## 2019-11-24 NOTE — ED ADULT NURSE REASSESSMENT NOTE - NS ED NURSE REASSESS COMMENT FT1
Urology at bedside, spoke to patient, pending social work to come see patient and speak about health insurance. Patient made aware.

## 2019-11-26 LAB
-  AMPICILLIN/SULBACTAM: SIGNIFICANT CHANGE UP
-  AMPICILLIN: SIGNIFICANT CHANGE UP
-  CEFAZOLIN: SIGNIFICANT CHANGE UP
-  CEFTRIAXONE: SIGNIFICANT CHANGE UP
-  GENTAMICIN: SIGNIFICANT CHANGE UP
-  NITROFURANTOIN: SIGNIFICANT CHANGE UP
-  PIPERACILLIN/TAZOBACTAM: SIGNIFICANT CHANGE UP
-  TOBRAMYCIN: SIGNIFICANT CHANGE UP
-  TRIMETHOPRIM/SULFAMETHOXAZOLE: SIGNIFICANT CHANGE UP
CULTURE RESULTS: SIGNIFICANT CHANGE UP
METHOD TYPE: SIGNIFICANT CHANGE UP
ORGANISM # SPEC MICROSCOPIC CNT: SIGNIFICANT CHANGE UP
ORGANISM # SPEC MICROSCOPIC CNT: SIGNIFICANT CHANGE UP
SPECIMEN SOURCE: SIGNIFICANT CHANGE UP

## 2019-12-18 ENCOUNTER — EMERGENCY (EMERGENCY)
Facility: HOSPITAL | Age: 54
LOS: 1 days | Discharge: HOME | End: 2019-12-18
Attending: EMERGENCY MEDICINE | Admitting: EMERGENCY MEDICINE
Payer: SELF-PAY

## 2019-12-18 VITALS
RESPIRATION RATE: 18 BRPM | OXYGEN SATURATION: 97 % | SYSTOLIC BLOOD PRESSURE: 127 MMHG | WEIGHT: 190.04 LBS | DIASTOLIC BLOOD PRESSURE: 69 MMHG | HEART RATE: 86 BPM | TEMPERATURE: 97 F

## 2019-12-18 PROCEDURE — 51702 INSERT TEMP BLADDER CATH: CPT

## 2019-12-18 PROCEDURE — 99283 EMERGENCY DEPT VISIT LOW MDM: CPT

## 2019-12-18 PROCEDURE — 99283 EMERGENCY DEPT VISIT LOW MDM: CPT | Mod: 25

## 2019-12-18 NOTE — ED PROVIDER NOTE - ATTENDING CONTRIBUTION TO CARE
Addendum to attending statement: I have reviewed the ACP note and agree with the history, exam, and plan of care. I  was available to JINA Chiang  as a supervising provider if needed.  53 M pmh BPH w/ multiple episodes of urinary retention and valdez placement presents requesting removal of valdez after placement on 11/24.  Reports it has been getting in the way at work and just wants it out for the holidays, has appointment with Dr. Villarreal on 1/10.  Denies fever, chills, abd pain, hematuria, decreased urine outpt, nv.  Valdez removed w/ initial small urine output, however then later unable to void will still monitoring in ED. Bedside US w/ distended bladder. Valdez replaced w/ 800ml urine output. D/c w/ f/u Uro as scheduled

## 2019-12-18 NOTE — ED ADULT NURSE NOTE - OBJECTIVE STATEMENT
Pt requesting Georgie Schultz d/c.  Pt states "Its been in since Nov and my F/U appt isn't until early January and I don't want this over the holidays."

## 2019-12-18 NOTE — ED PROVIDER NOTE - PHYSICAL EXAMINATION
Vitals reviewed  Gen: well appearing, nad, speaking in full sentences  Skin: wwp   HEENT: ncat, eomi, mmm  CV: rrr, no audible m/r/g  Resp: ctab, no w/r/r  Abd: nondistended, soft/nt, valdez leg bag with approx 200cc clear yellow urine  Ext: FROM throughout, no peripheral edema  Neuro: alert/oriented, no focal deficits, steady gait

## 2019-12-18 NOTE — ED PROVIDER NOTE - PROGRESS NOTE DETAILS
pt attempted TOV, small urine output now reporting inability to void and lower abd pain requesting valdez placed.  bedside sono with distended bladder, will attempt once if unsuccessful will consult uro valdez successfully placed by RN, 800cc clear yellow urine outpt.  pt has urology follow up, d/c in stable condition.  discussed strict return parameters

## 2019-12-18 NOTE — ED PROVIDER NOTE - CLINICAL SUMMARY MEDICAL DECISION MAKING FREE TEXT BOX
53 M pmh BPH w/ multiple episodes of urinary retention and valdez placement presents requesting removal of valdez after placement on 11/24.  will remove and TOV, if unable to void will replace valdez

## 2019-12-18 NOTE — ED PROVIDER NOTE - CARE PROVIDER_API CALL
Aden Villarreal)  Urology  [address], [facility], [place] [address]  Phone: [phone]  Fax: [phone]  Follow Up Time:

## 2019-12-18 NOTE — ED PROVIDER NOTE - PATIENT PORTAL LINK FT
You can access the FollowMyHealth Patient Portal offered by [facility] by registering at the following website: [email or web address]. By joining [employer]’s FollowMyHealth portal, you will also be able to view your health information using other applications (apps) compatible with our system.

## 2019-12-18 NOTE — ED PROVIDER NOTE - NSFOLLOWUPINSTRUCTIONS_ED_ALL_ED_FT
Keep valdez in place and follow up with Dr. Villarreal as scheduled.  Return to ED for fever, abdominal pain, blood in urine, valdez not draining, vomiting    Indwelling Urinary Catheter Insertion, Care After  This sheet gives you information about how to care for yourself after your procedure. Your health care provider may also give you more specific instructions. If you have problems or questions, contact your health care provider.  What can I expect after the procedure?  After the procedure, it is common to have:  Slight discomfort around your urethra where the catheter enters your body.Follow these instructions at home:     Keep the drainage bag at or below the level of your bladder. Doing this ensures that urine can only drain out, not back into your body.Secure the catheter tubing and drainage bag to your leg or thigh to keep it from moving.Check the catheter tubing regularly to make sure there are no kinks or blockages.Take showers daily to keep the catheter clean. Do not take a bath.Do not pull on your catheter or try to remove it.Disconnect the tubing and drainage bag as little as possible.Empty the drainage bag every 2–4 hours, or more often if needed. Do not let the bag get completely full.Wash your hands with soap and water before and after touching the catheter, tubing, or drainage bag.Do not let the drainage bag or catheter tubing touch the floor.Drink enough fluids to keep your urine clear or pale yellow, or as told by your health care provider.Contact a health care provider if:  Urine stops flowing into the drainage bag.You feel pain or pressure in the bladder area.You have back pain.Your catheter gets clogged.Your catheter starts to leak.Your urine looks cloudy.Your drainage bag or tubing looks dirty.You notice a bad smell when emptying your drainage bag.Get help right away if:  You have a fever or chills.You have severe pain in your back or your lower abdomen.You have warmth, redness, swelling, or pain in the urethra area.You notice blood in your urine.Your catheter gets pulled out.Summary  Do not pull on your catheter or try to remove it.Keep the drainage bag at or below the level of your bladder, but do not let the drainage bag or catheter tubing touch the floor.Wash your hands with soap and water before and after touching the catheter, tubing, or drainage bag.Contact your health care provider if you have a fever, chills, or any other signs of infection.This information is not intended to replace advice given to you by your health care provider. Make sure you discuss any questions you have with your health care provider.

## 2019-12-26 DIAGNOSIS — R33.9 RETENTION OF URINE, UNSPECIFIED: ICD-10-CM
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
## 2019-12-26 DIAGNOSIS — N40.1 BENIGN PROSTATIC HYPERPLASIA WITH LOWER URINARY TRACT SYMPTOMS: ICD-10-CM

## 2019-12-26 DIAGNOSIS — T83.091A OTHER MECHANICAL COMPLICATION OF INDWELLING URETHRAL CATHETER, INITIAL ENCOUNTER: ICD-10-CM

## 2020-01-10 ENCOUNTER — APPOINTMENT (OUTPATIENT)
Dept: UROLOGY | Facility: CLINIC | Age: 55
End: 2020-01-10

## 2020-02-12 ENCOUNTER — EMERGENCY (EMERGENCY)
Facility: HOSPITAL | Age: 55
LOS: 1 days | Discharge: HOME | End: 2020-02-12
Attending: EMERGENCY MEDICINE | Admitting: EMERGENCY MEDICINE
Payer: SELF-PAY

## 2020-02-12 VITALS
HEIGHT: 73 IN | SYSTOLIC BLOOD PRESSURE: 162 MMHG | HEART RATE: 99 BPM | RESPIRATION RATE: 18 BRPM | TEMPERATURE: 98 F | DIASTOLIC BLOOD PRESSURE: 99 MMHG | WEIGHT: 190.04 LBS | OXYGEN SATURATION: 99 %

## 2020-02-12 VITALS
HEART RATE: 80 BPM | OXYGEN SATURATION: 99 % | SYSTOLIC BLOOD PRESSURE: 157 MMHG | TEMPERATURE: 98 F | RESPIRATION RATE: 16 BRPM | DIASTOLIC BLOOD PRESSURE: 88 MMHG

## 2020-02-12 DIAGNOSIS — N40.1 BENIGN PROSTATIC HYPERPLASIA WITH LOWER URINARY TRACT SYMPTOMS: ICD-10-CM
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
## 2020-02-12 DIAGNOSIS — R33.8 OTHER RETENTION OF URINE: ICD-10-CM

## 2020-02-12 DIAGNOSIS — T83.098A OTHER MECHANICAL COMPLICATION OF OTHER URINARY CATHETER, INITIAL ENCOUNTER: ICD-10-CM

## 2020-02-12 LAB
ALBUMIN SERPL ELPH-MCNC: 4.7 G/DL — SIGNIFICANT CHANGE UP (ref 3.3–5)
ALP SERPL-CCNC: 67 U/L — SIGNIFICANT CHANGE UP (ref 40–120)
ALT FLD-CCNC: 14 U/L — SIGNIFICANT CHANGE UP (ref 10–45)
ANION GAP SERPL CALC-SCNC: 11 MMOL/L — SIGNIFICANT CHANGE UP (ref 5–17)
APPEARANCE UR: ABNORMAL
AST SERPL-CCNC: 22 U/L — SIGNIFICANT CHANGE UP (ref 10–40)
BASOPHILS # BLD AUTO: 0.02 K/UL — SIGNIFICANT CHANGE UP (ref 0–0.2)
BASOPHILS NFR BLD AUTO: 0.2 % — SIGNIFICANT CHANGE UP (ref 0–2)
BILIRUB SERPL-MCNC: 0.8 MG/DL — SIGNIFICANT CHANGE UP (ref 0.2–1.2)
BILIRUB UR-MCNC: NEGATIVE — SIGNIFICANT CHANGE UP
BUN SERPL-MCNC: 14 MG/DL — SIGNIFICANT CHANGE UP (ref 7–23)
CALCIUM SERPL-MCNC: 9.8 MG/DL — SIGNIFICANT CHANGE UP (ref 8.4–10.5)
CHLORIDE SERPL-SCNC: 100 MMOL/L — SIGNIFICANT CHANGE UP (ref 96–108)
CO2 SERPL-SCNC: 26 MMOL/L — SIGNIFICANT CHANGE UP (ref 22–31)
COLOR SPEC: YELLOW — SIGNIFICANT CHANGE UP
CREAT SERPL-MCNC: 0.79 MG/DL — SIGNIFICANT CHANGE UP (ref 0.5–1.3)
DIFF PNL FLD: ABNORMAL
EOSINOPHIL # BLD AUTO: 0.01 K/UL — SIGNIFICANT CHANGE UP (ref 0–0.5)
EOSINOPHIL NFR BLD AUTO: 0.1 % — SIGNIFICANT CHANGE UP (ref 0–6)
GLUCOSE SERPL-MCNC: 121 MG/DL — HIGH (ref 70–99)
GLUCOSE UR QL: NEGATIVE — SIGNIFICANT CHANGE UP
HCT VFR BLD CALC: 46.6 % — SIGNIFICANT CHANGE UP (ref 39–50)
HGB BLD-MCNC: 14.8 G/DL — SIGNIFICANT CHANGE UP (ref 13–17)
IMM GRANULOCYTES NFR BLD AUTO: 0.4 % — SIGNIFICANT CHANGE UP (ref 0–1.5)
KETONES UR-MCNC: NEGATIVE — SIGNIFICANT CHANGE UP
LEUKOCYTE ESTERASE UR-ACNC: ABNORMAL
LYMPHOCYTES # BLD AUTO: 0.97 K/UL — LOW (ref 1–3.3)
LYMPHOCYTES # BLD AUTO: 10.2 % — LOW (ref 13–44)
MCHC RBC-ENTMCNC: 28.7 PG — SIGNIFICANT CHANGE UP (ref 27–34)
MCHC RBC-ENTMCNC: 31.8 GM/DL — LOW (ref 32–36)
MCV RBC AUTO: 90.5 FL — SIGNIFICANT CHANGE UP (ref 80–100)
MONOCYTES # BLD AUTO: 0.45 K/UL — SIGNIFICANT CHANGE UP (ref 0–0.9)
MONOCYTES NFR BLD AUTO: 4.7 % — SIGNIFICANT CHANGE UP (ref 2–14)
NEUTROPHILS # BLD AUTO: 8 K/UL — HIGH (ref 1.8–7.4)
NEUTROPHILS NFR BLD AUTO: 84.4 % — HIGH (ref 43–77)
NITRITE UR-MCNC: NEGATIVE — SIGNIFICANT CHANGE UP
NRBC # BLD: 0 /100 WBCS — SIGNIFICANT CHANGE UP (ref 0–0)
PH UR: 6 — SIGNIFICANT CHANGE UP (ref 5–8)
PLATELET # BLD AUTO: 193 K/UL — SIGNIFICANT CHANGE UP (ref 150–400)
POTASSIUM SERPL-MCNC: 4 MMOL/L — SIGNIFICANT CHANGE UP (ref 3.5–5.3)
POTASSIUM SERPL-SCNC: 4 MMOL/L — SIGNIFICANT CHANGE UP (ref 3.5–5.3)
PROT SERPL-MCNC: 8.2 G/DL — SIGNIFICANT CHANGE UP (ref 6–8.3)
PROT UR-MCNC: NEGATIVE MG/DL — SIGNIFICANT CHANGE UP
RBC # BLD: 5.15 M/UL — SIGNIFICANT CHANGE UP (ref 4.2–5.8)
RBC # FLD: 12.4 % — SIGNIFICANT CHANGE UP (ref 10.3–14.5)
SODIUM SERPL-SCNC: 137 MMOL/L — SIGNIFICANT CHANGE UP (ref 135–145)
SP GR SPEC: 1.02 — SIGNIFICANT CHANGE UP (ref 1–1.03)
UROBILINOGEN FLD QL: 0.2 E.U./DL — SIGNIFICANT CHANGE UP
WBC # BLD: 9.49 K/UL — SIGNIFICANT CHANGE UP (ref 3.8–10.5)
WBC # FLD AUTO: 9.49 K/UL — SIGNIFICANT CHANGE UP (ref 3.8–10.5)

## 2020-02-12 PROCEDURE — 85025 COMPLETE CBC W/AUTO DIFF WBC: CPT

## 2020-02-12 PROCEDURE — 81001 URINALYSIS AUTO W/SCOPE: CPT

## 2020-02-12 PROCEDURE — 36415 COLL VENOUS BLD VENIPUNCTURE: CPT

## 2020-02-12 PROCEDURE — 99283 EMERGENCY DEPT VISIT LOW MDM: CPT | Mod: 25

## 2020-02-12 PROCEDURE — 80053 COMPREHEN METABOLIC PANEL: CPT

## 2020-02-12 PROCEDURE — 87086 URINE CULTURE/COLONY COUNT: CPT

## 2020-02-12 PROCEDURE — 51702 INSERT TEMP BLADDER CATH: CPT

## 2020-02-12 PROCEDURE — 99284 EMERGENCY DEPT VISIT MOD MDM: CPT

## 2020-02-12 RX ORDER — CEFPODOXIME PROXETIL 100 MG
1 TABLET ORAL
Qty: 14 | Refills: 0
Start: 2020-02-12 | End: 2020-02-18

## 2020-02-12 NOTE — ED PROVIDER NOTE - PATIENT PORTAL LINK FT
You can access the FollowMyHealth Patient Portal offered by [facility] by registering at the following website: [email or web address]. By joining [employer]’s FollowMyHealth portal, you will also be able to view your health information using other applications (apps) compatible with our system.

## 2020-02-12 NOTE — ED ADULT TRIAGE NOTE - ARRIVAL INFO ADDITIONAL COMMENTS
pt states he has had a valdez catheter for BHP and it has been in for too long and it is no longer draining.

## 2020-02-12 NOTE — ED ADULT NURSE NOTE - PAIN: BODY LOCATION
normal... Well appearing, well nourished, awake, alert, oriented to person, place, time/situation and in no apparent distress. penis

## 2020-02-12 NOTE — ED PROVIDER NOTE - CARE PLAN
Principal Discharge DX:	Urinary retention Principal Discharge DX:	Urinary retention  Secondary Diagnosis:	Benign prostatic hyperplasia, presence of lower urinary tract symptoms unspecified, unspecified morphology

## 2020-02-12 NOTE — ED PROVIDER NOTE - CLINICAL SUMMARY MEDICAL DECISION MAKING FREE TEXT BOX
Jacques exchanged, labs noted, Cr wnl. Will treat with abx given infected appearing UA. Ucx sent. Pt encouraged to /fu with Dr. Villarreal for definitive management.

## 2020-02-12 NOTE — ED PROVIDER NOTE - NSFOLLOWUPINSTRUCTIONS_ED_ALL_ED_FT
Please follow up with your primary care physician and urology. If you have any problem getting an appointment this week, please call the ED Referral Coordinator at [phone].  Return to the Emergency Department if you have any new or worsening symptoms, or for any other concerns. Please read below for further information.    Indwelling Urinary Catheter Care, Adult  An indwelling urinary catheter is a thin, flexible, germ-free (sterile) tube that is placed into the bladder to help drain urine out of the body. The catheter is inserted into the part of the body that drains urine from the bladder (urethra). Urine drains from the catheter into a drainage bag outside of the body.  Taking good care of your catheter will keep it working properly and help to prevent problems from developing.  What are the risks?  Bacteria may get into your bladder and cause a urinary tract infection.Urine flow can become blocked. This can happen if the catheter is not working correctly, or if you have sediment or a blood clot in your bladder or the catheter.Tissue near the catheter may become irritated and bleed.How to wear your catheter and your drainage bag  Supplies needed     Adhesive tape or a leg strap.Alcohol wipe or soap and water (if you use tape).A clean towel (if you use tape).Overnight drainage bag.Smaller drainage bag (leg bag).Wearing your catheter and bag     Use adhesive tape or a leg strap to attach your catheter to your leg.  Make sure the catheter is not pulled tight.If a leg strap gets wet, replace it with a dry one.If you use adhesive tape:  Use an alcohol wipe or soap and water to wash off any stickiness on your skin where you had tape before.Use a clean towel to pat-dry the area.Apply the new tape.You should have received a large overnight drainage bag and a smaller leg bag that fits underneath clothing.   You may wear the overnight bag at any time, but you should not wear the leg bag at night.Always wear the leg bag below your knee.Make sure the overnight drainage bag is always lower than the level of your bladder, but do not let it touch the floor. Before you go to sleep, hang the bag inside a wastebasket that is covered by a clean plastic bag.How to care for your skin around the catheter            Supplies needed     A clean washcloth.Water and mild soap.A clean towel.Caring for your skin and catheter     Every day, use a clean washcloth and soapy water to clean the skin around your catheter.  Wash your hands with soap and water.Wet a washcloth in warm water and mild soap.Clean the skin around your urethra.  If you are female:  Use one hand to gently spread the folds of skin around your vagina (labia).With the washcloth in your other hand, wipe the inner side of your labia on each side. Do this in a front-to-back direction.If you are male:  Use one hand to pull back any skin that covers the end of your penis (foreskin).With the washcloth in your other hand, wipe your penis in small circles. Start wiping at the tip of your penis, then move outward from the catheter.With your free hand, hold the catheter close to where it enters your body. Keep holding the catheter during cleaning so it does not get pulled out.Use your other hand to clean the catheter with the washcloth.  Only wipe downward on the catheter.Do not wipe upward toward your body, because that may push bacteria into your urethra and cause infection.Use a clean towel to pat-dry the catheter and the skin around it. Make sure to wipe off all soap.Wash your hands with soap and water.Shower every day. Do not take baths.Do not use cream, ointment, or lotion on the area where the catheter enters your body, unless your health care provider tells you to do that.Do not use powders, sprays, or lotions on your genital area.Check your skin around the catheter every day for signs of infection. Check for:  Redness, swelling, or pain.Fluid or blood.Warmth.Pus or a bad smell.How to empty the drainage bag  Supplies needed     Rubbing alcohol.Gauze pad or cotton ball.Adhesive tape or a leg strap.Emptying the bag     Empty your drainage bag (your overnight drainage bag or your leg bag) when it is ?–½ full, or at least 2–3 times a day. Clean the drainage bag according to the 's instructions or as told by your health care provider.  Wash your hands with soap and water.  Detach the drainage bag from your leg.  Hold the drainage bag over the toilet or a clean container. Make sure the drainage bag is lower than your hips and bladder. This stops urine from going back into the tubing and into your bladder.  Open the pour spout at the bottom of the bag.  Empty the urine into the toilet or container. Do not let the pour spout touch any surface. This precaution is important to prevent bacteria from getting in the bag and causing infection.  Apply rubbing alcohol to a gauze pad or cotton ball.  Use the gauze pad or cotton ball to clean the pour spout.  Close the pour spout.  Attach the bag to your leg with adhesive tape or a leg strap.  Wash your hands with soap and water.  How to change the drainage bag  Supplies needed:     Alcohol wipes.A clean drainage bag.Adhesive tape or a leg strap.Changing the bag     Replace your drainage bag with a clean bag once a month. Replace the bag sooner if it leaks, starts to smell bad, or looks dirty.  Wash your hands with soap and water.  Detach the dirty drainage bag from your leg.  Pinch the catheter with your fingers so that urine does not spill out.  Disconnect the catheter tube from the drainage tube at the connection valve. Do not let the tubes touch any surface.  Clean the end of the catheter tube with an alcohol wipe. Use a different alcohol wipe to clean the end of the drainage tube.  Connect the catheter tube to the drainage tube of the clean bag.  Attach the clean bag to your leg with adhesive tape or a leg strap. Avoid attaching the new bag too tightly.  Wash your hands with soap and water.  General instructions     Never pull on your catheter or try to remove it. Pulling can damage your internal tissues.Always wash your hands before and after you handle your catheter or drainage bag. Use a mild, fragrance-free soap. If soap and water are not available, use hand .Always make sure there are no twists or bends (kinks) in the catheter tube.Always make sure there are no leaks in the catheter or drainage bag.Drink enough fluid to keep your urine pale yellow.Do not take baths, swim, or use a hot tub.If you are female, wipe from front to back after having a bowel movement.Contact a health care provider if:  Your urine is cloudy.Your urine smells unusually bad.Your catheter gets clogged.Your catheter starts to leak.Your bladder feels full.Get help right away if:  You have redness, swelling, or pain where the catheter enters your body.You have fluid, blood, pus, or a bad smell coming from the area where the catheter enters your body.The area where the catheter enters your body feels warm to the touch.You have a fever.You have pain in your abdomen, legs, lower back, or bladder.You see blood in the catheter.Your urine is pink or red.You have nausea, vomiting, or chills.Your urine is not draining into the bag.Your catheter gets pulled out.Summary  An indwelling urinary catheter is a thin, flexible, germ-free (sterile) tube that is placed into the bladder to help drain urine out of the body.The catheter is inserted into the part of the body that drains urine from the bladder (urethra).Take good care of your catheter to keep it working properly and help prevent problems from developing.Always wash your hands before and after you handle your catheter or drainage bag.Never pull on your catheter or try to remove it.This information is not intended to replace advice given to you by your health care provider. Make sure you discuss any questions you have with your health care provider.

## 2020-02-12 NOTE — ED PROVIDER NOTE - CARE PROVIDER_API CALL
Aden Villarreal)  Urology  [address], [facility], [place] [address]  Phone: [phone]  Fax: [phone]  Follow Up Time:

## 2020-02-12 NOTE — ED PROVIDER NOTE - PHYSICAL EXAMINATION
GEN: Well appearing, well nourished, awake, alert, oriented to person, place, time/situation and in no apparent distress.  ENT: Airway patent, Nasal mucosa clear. Mouth with normal mucosa.  EYES: Clear bilaterally.  RESPIRATORY: Breathing comfortably with normal RR.  CARDIAC: Regular rate and rhythm  ABDOMEN: Soft, mild suprapubic TTP, +bowel sounds, no rebound, rigidity, or guarding. Jacques in place, small amoutn of bloody urine + debris in leg bag.  MSK: Range of motion is not limited, no deformities noted.  NEURO: Alert and oriented, no focal deficits.  SKIN: Skin normal color for race, warm, dry and intact. No evidence of rash.  PSYCH: Alert and oriented to person, place, time/situation. normal mood and affect. no apparent risk to self or others.

## 2020-02-12 NOTE — ED PROVIDER NOTE - OBJECTIVE STATEMENT
53M with a pmhx of BPH w/ multiple episodes of urinary retention and valdez placement who p/w problem with his indwelling valdez catheter. He states that for the past 5 hours it hasn't really been draining and he notes some debris and blood in the tube. He states it might have gotten tugged on while he was working. He has not had the catheter changed in about 2 months. Associated with suprapubic distention. Denies fever, chills, flank pain, cp/sob, dizziness, syncope or any other complaints. urologist is dr. Villarreal but he has not found time to follow up with him.

## 2020-02-12 NOTE — ED ADULT NURSE NOTE - OBJECTIVE STATEMENT
Patient aox3 and ambulatory with steady gait upon arrival. Patient c/o urinary catheter not working x 5/6 hours. Patient states "I have had the catheter in for 2 months. I know I should have gotten it changed before now, but I have been working a lot." Patient states "I just noticed that no urine was coming out a few hours ago and then I noticed some blood." Patient rates pain as 8/10 and states "I think I am retaining urine." Patient denies any CP, SOB, dizziness, N/V/D, weakness. Patient has clear and equal bilateral lung sounds. Patient has normoactive bowel sounds. Patient PMH: BPH. Patient states that he has had catheter intermittently for 5 years due to BPH. Urine in patient's valdez bag is brown/red. Patient states "I think my urine has been infected for a while."

## 2020-02-14 LAB
CULTURE RESULTS: NO GROWTH — SIGNIFICANT CHANGE UP
SPECIMEN SOURCE: SIGNIFICANT CHANGE UP

## 2020-03-17 NOTE — ED PROVIDER NOTE - NS ED MD DISPO DISCHARGE CCDA
Patient/Caregiver provided printed discharge information. Use Therapeutic Ranged Or Therapeutic Target: please select Range or Target

## 2020-05-11 ENCOUNTER — EMERGENCY (EMERGENCY)
Facility: HOSPITAL | Age: 55
LOS: 1 days | Discharge: HOME | End: 2020-05-11
Attending: EMERGENCY MEDICINE | Admitting: EMERGENCY MEDICINE
Payer: SELF-PAY

## 2020-05-11 VITALS
RESPIRATION RATE: 18 BRPM | OXYGEN SATURATION: 99 % | HEART RATE: 77 BPM | DIASTOLIC BLOOD PRESSURE: 75 MMHG | SYSTOLIC BLOOD PRESSURE: 134 MMHG | TEMPERATURE: 98 F

## 2020-05-11 VITALS
HEIGHT: 73 IN | TEMPERATURE: 98 F | HEART RATE: 102 BPM | OXYGEN SATURATION: 99 % | RESPIRATION RATE: 19 BRPM | DIASTOLIC BLOOD PRESSURE: 101 MMHG | SYSTOLIC BLOOD PRESSURE: 153 MMHG | WEIGHT: 190.04 LBS

## 2020-05-11 LAB
ALBUMIN SERPL ELPH-MCNC: 4.7 G/DL — SIGNIFICANT CHANGE UP (ref 3.3–5)
ALP SERPL-CCNC: 62 U/L — SIGNIFICANT CHANGE UP (ref 40–120)
ALT FLD-CCNC: 15 U/L — SIGNIFICANT CHANGE UP (ref 10–45)
ANION GAP SERPL CALC-SCNC: 12 MMOL/L — SIGNIFICANT CHANGE UP (ref 5–17)
APPEARANCE UR: CLEAR — SIGNIFICANT CHANGE UP
AST SERPL-CCNC: 19 U/L — SIGNIFICANT CHANGE UP (ref 10–40)
BASOPHILS # BLD AUTO: 0.03 K/UL — SIGNIFICANT CHANGE UP (ref 0–0.2)
BASOPHILS NFR BLD AUTO: 0.3 % — SIGNIFICANT CHANGE UP (ref 0–2)
BILIRUB SERPL-MCNC: 0.8 MG/DL — SIGNIFICANT CHANGE UP (ref 0.2–1.2)
BILIRUB UR-MCNC: NEGATIVE — SIGNIFICANT CHANGE UP
BUN SERPL-MCNC: 18 MG/DL — SIGNIFICANT CHANGE UP (ref 7–23)
CALCIUM SERPL-MCNC: 9.4 MG/DL — SIGNIFICANT CHANGE UP (ref 8.4–10.5)
CHLORIDE SERPL-SCNC: 101 MMOL/L — SIGNIFICANT CHANGE UP (ref 96–108)
CO2 SERPL-SCNC: 26 MMOL/L — SIGNIFICANT CHANGE UP (ref 22–31)
COLOR SPEC: YELLOW — SIGNIFICANT CHANGE UP
CREAT SERPL-MCNC: 0.88 MG/DL — SIGNIFICANT CHANGE UP (ref 0.5–1.3)
DIFF PNL FLD: ABNORMAL
EOSINOPHIL # BLD AUTO: 0.02 K/UL — SIGNIFICANT CHANGE UP (ref 0–0.5)
EOSINOPHIL NFR BLD AUTO: 0.2 % — SIGNIFICANT CHANGE UP (ref 0–6)
GLUCOSE SERPL-MCNC: 114 MG/DL — HIGH (ref 70–99)
GLUCOSE UR QL: NEGATIVE — SIGNIFICANT CHANGE UP
HCT VFR BLD CALC: 45.3 % — SIGNIFICANT CHANGE UP (ref 39–50)
HGB BLD-MCNC: 14.6 G/DL — SIGNIFICANT CHANGE UP (ref 13–17)
IMM GRANULOCYTES NFR BLD AUTO: 0.2 % — SIGNIFICANT CHANGE UP (ref 0–1.5)
KETONES UR-MCNC: NEGATIVE — SIGNIFICANT CHANGE UP
LEUKOCYTE ESTERASE UR-ACNC: ABNORMAL
LYMPHOCYTES # BLD AUTO: 0.99 K/UL — LOW (ref 1–3.3)
LYMPHOCYTES # BLD AUTO: 11.4 % — LOW (ref 13–44)
MCHC RBC-ENTMCNC: 29.4 PG — SIGNIFICANT CHANGE UP (ref 27–34)
MCHC RBC-ENTMCNC: 32.2 GM/DL — SIGNIFICANT CHANGE UP (ref 32–36)
MCV RBC AUTO: 91.1 FL — SIGNIFICANT CHANGE UP (ref 80–100)
MONOCYTES # BLD AUTO: 0.32 K/UL — SIGNIFICANT CHANGE UP (ref 0–0.9)
MONOCYTES NFR BLD AUTO: 3.7 % — SIGNIFICANT CHANGE UP (ref 2–14)
NEUTROPHILS # BLD AUTO: 7.32 K/UL — SIGNIFICANT CHANGE UP (ref 1.8–7.4)
NEUTROPHILS NFR BLD AUTO: 84.2 % — HIGH (ref 43–77)
NITRITE UR-MCNC: NEGATIVE — SIGNIFICANT CHANGE UP
NRBC # BLD: 0 /100 WBCS — SIGNIFICANT CHANGE UP (ref 0–0)
PH UR: 6 — SIGNIFICANT CHANGE UP (ref 5–8)
PLATELET # BLD AUTO: 182 K/UL — SIGNIFICANT CHANGE UP (ref 150–400)
POTASSIUM SERPL-MCNC: 4.1 MMOL/L — SIGNIFICANT CHANGE UP (ref 3.5–5.3)
POTASSIUM SERPL-SCNC: 4.1 MMOL/L — SIGNIFICANT CHANGE UP (ref 3.5–5.3)
PROT SERPL-MCNC: 8.1 G/DL — SIGNIFICANT CHANGE UP (ref 6–8.3)
PROT UR-MCNC: NEGATIVE MG/DL — SIGNIFICANT CHANGE UP
RBC # BLD: 4.97 M/UL — SIGNIFICANT CHANGE UP (ref 4.2–5.8)
RBC # FLD: 12.3 % — SIGNIFICANT CHANGE UP (ref 10.3–14.5)
SODIUM SERPL-SCNC: 139 MMOL/L — SIGNIFICANT CHANGE UP (ref 135–145)
SP GR SPEC: 1.02 — SIGNIFICANT CHANGE UP (ref 1–1.03)
UROBILINOGEN FLD QL: 0.2 E.U./DL — SIGNIFICANT CHANGE UP
WBC # BLD: 8.7 K/UL — SIGNIFICANT CHANGE UP (ref 3.8–10.5)
WBC # FLD AUTO: 8.7 K/UL — SIGNIFICANT CHANGE UP (ref 3.8–10.5)

## 2020-05-11 PROCEDURE — 36415 COLL VENOUS BLD VENIPUNCTURE: CPT

## 2020-05-11 PROCEDURE — 85025 COMPLETE CBC W/AUTO DIFF WBC: CPT

## 2020-05-11 PROCEDURE — 80053 COMPREHEN METABOLIC PANEL: CPT

## 2020-05-11 PROCEDURE — 99284 EMERGENCY DEPT VISIT MOD MDM: CPT

## 2020-05-11 PROCEDURE — 51702 INSERT TEMP BLADDER CATH: CPT

## 2020-05-11 PROCEDURE — 99053 MED SERV 10PM-8AM 24 HR FAC: CPT

## 2020-05-11 PROCEDURE — 81001 URINALYSIS AUTO W/SCOPE: CPT

## 2020-05-11 PROCEDURE — 87086 URINE CULTURE/COLONY COUNT: CPT

## 2020-05-11 PROCEDURE — 99283 EMERGENCY DEPT VISIT LOW MDM: CPT | Mod: 25

## 2020-05-11 RX ORDER — CEFPODOXIME PROXETIL 100 MG
1 TABLET ORAL
Qty: 14 | Refills: 0
Start: 2020-05-11 | End: 2020-05-17

## 2020-05-11 NOTE — ED ADULT NURSE NOTE - OBJECTIVE STATEMENT
pt a&ox3 resting comfortably in stretcher. pt reports valdez inserted 2-3 months ago, reports that he would usually go to ed to have it changed, but with the pandemic he did not want to inconvenience anyone. valdez was draining normally until last night. reports that he feels "sediment build up and clogged it" denies fever, chills, n,v,d,cough, cp, sob, ha, but mentions that he might have a uti based on how long the valdez has been in. denies  symptoms. pt a&ox3 resting comfortably in stretcher. pt reports valdez inserted 2-3 months ago, reports that he would usually go to ed to have it changed, but with the pandemic he did not want to inconvenience anyone. valdez was draining normally until last night. reports that he feels "sediment build up and clogged it" denies fever, chills, n,v,d,cough, cp, sob, ha, but mentions that he might have a uti based on how long the valdez has been in. denies  symptoms. abdomen distended, non tender, soft.

## 2020-05-11 NOTE — ED PROVIDER NOTE - ATTENDING CONTRIBUTION TO CARE
55 yo hx of BPH w urinary retention w valdez not draining for a few hours pta, has had to have it exchanged before. no cp, sob, abd pain, flank pain, f/c. Well appearing, nad, nc/at, lung cta, heart reg, abd soft, nt, ext no gross deformity, no gross neuro deficits, A/P: Urinary retention: valdez replaced, will treat UTI, to fu w urology.

## 2020-05-11 NOTE — ED ADULT TRIAGE NOTE - CHIEF COMPLAINT QUOTE
pt states "I need to have my catheter removed and replaced. I had it in for about 3 months but I was waiting for covid to be over".

## 2020-05-11 NOTE — ED PROVIDER NOTE - CARE PROVIDER_API CALL
Aden Villarreal)  Urology  [address], [facility], [place] [address]  Phone: [phone]  Fax: [phone]  Follow Up Time:

## 2020-05-11 NOTE — ED PROVIDER NOTE - PHYSICAL EXAMINATION
General: Patient is well developed and well nourised. Patient is alert and oriented to person, place and date. Patient is laying comfortably in stretcher and appears in no acute distress.  HEENT: Head is normocephalic and atraumatic. Pupils are equal, round and reactive. Extraocular movements intact. No evidence of nystagmus, conjunctival injection, or scleral icterus. External ears symmetric without evidence of discharge.  Nose is symmetric, non-tender, patent without evidence of discharge. Teeth in good repair. Uvula midline.   Neck: Supple with no evidence of lymphadenopathy.  Full range of motion.  Heart: Regular rate and rhythm. No murmurs, rubs or gallops.   Lungs: Clear to auscultation bilaterally with equal chest expansion. No note of wheezes, rhonchi, rales. Equal chest expansion. No note of retractions.  Abdomen: Bowel sounds present in all four quadrants. Soft, non-tender, non-distended without signs of masses, rebound or guarding. No note of hepatosplenomegaly. No CVA tenderness bilaterally. Negative Ulloa sign. No pain present over McBurney's point.  :   	MALE: no evidence or rashes, ulcers, nodules or scars. No evidence of discharge, scrotal masses or hernias  Neuro: GCS 15. Moving all extremities without discomfort. Strength is 5/5 arms and legs bilaterally. Sensation intact in all four extremities. gait steady   Skin: Warm, dry and intact without evidence of rashes, bruising, pallor, jaundice or cyanosis.   Psych: Mood and affect appropriate.

## 2020-05-11 NOTE — ED PROVIDER NOTE - CLINICAL SUMMARY MEDICAL DECISION MAKING FREE TEXT BOX
54 year old male pmhx bph with valdez catheter placement presenting to the ed with decreased valdez output since last night. states placed "a few months ago" and has not been able to follow up with urology. Pe patinet appears well, non-toxic. ttp suprapubic region. plan for labs, and valdez exchange. 54 year old male pmhx bph with valdez catheter placement presenting to the ed with decreased valdez output since last night. states placed "a few months ago" and has not been able to follow up with urology. Pe patinet appears well, non-toxic. ttp suprapubic region. plan for labs, and valdez exchange. recommend follow up with urology. ED evaluation and management discussed with the patient and family (if available) in detail.  Close PMD follow up encouraged.  Strict ED return instructions discussed in detail and patient given the opportunity to ask any questions about their discharge diagnosis and instructions. Patient verbalized understanding. Patient is agreeable to plan.

## 2020-05-11 NOTE — ED PROVIDER NOTE - OBJECTIVE STATEMENT
states that he has a history of BPH and has had valdez cathether placed. unable to elaborate when the valdez was placed "But it has been a few months". states that it stopped draining last night. states "It feels like when it gets blocked and needs to be changed". does not endorse fevers chills dysuria "but I might have an infection". no chest pain palpitations cough sob nausea vomiting diarrhea or abdominal pain.

## 2020-05-11 NOTE — ED PROVIDER NOTE - NSFOLLOWUPINSTRUCTIONS_ED_ALL_ED_FT
please take antibiotics as prescribed    please follow up with Dr. Villarreal for follow up            URINARY RETENTION IN MEN - AfterCare(R) Instructions(ER/ED)     Urinary Retention in Men    WHAT YOU NEED TO KNOW:    Urinary retention is a condition that develops when your bladder does not empty completely when you urinate. Male Reproductive System         DISCHARGE INSTRUCTIONS:    Medicines:     Medicines can help decrease the size of your prostate, fight infection, and help you urinate more easily.      Take your medicine as directed. Contact your healthcare provider if you think your medicine is not helping or if you have side effects. Tell him or her if you are allergic to any medicine. Keep a list of the medicines, vitamins, and herbs you take. Include the amounts, and when and why you take them. Bring the list or the pill bottles to follow-up visits. Carry your medicine list with you in case of an emergency.    Jacques catheter care: You may need a Jacques catheter for up to 2 weeks at home. Healthcare providers will give you a smaller leg bag to collect urine. Keep the bag below your waist. This will prevent urine from flowing back into your bladder and causing an infection or other problems. Also, keep the tube free of kinks so the urine will drain properly. Do not pull on the catheter. This can cause pain and bleeding, and may cause the catheter to come out. Ask your healthcare provider or urologist for more information on Jacques catheter care.    Urinate regularly: When your catheter is removed, do not let your bladder become too full before you urinate. Set regular times each day to urinate. Urinate as soon as you feel the need or at least every 3 hours while you are awake. Do not drink liquids before you go to bed. Urinate right before you go to bed.    Follow up with your healthcare provider or urologist as directed: Write down your questions so you remember to ask them during your visits.     Contact your healthcare provider or urologist if:     You have a fever.      You have pain when you urinate.      You have blood in your urine.      You have problems with your catheter.      You have questions or concerns about your condition or care.    Return to the emergency department if:     You have severe abdominal pain.      You are breathing faster than usual.      Your heartbeat is faster than usual.      Your face, hands, feet, or ankles are swollen.          © Copyright [employer] 2020       back to top                      © Copyright [employer] 2020

## 2020-05-11 NOTE — ED PROVIDER NOTE - PATIENT PORTAL LINK FT
You can access the FollowMyHealth Patient Portal offered by [facility] by registering at the following website: [email or web address]. By joining [employer]’s FollowMyHealth portal, you will also be able to view your health information using other applications (apps) compatible with our system.

## 2020-05-12 LAB
CULTURE RESULTS: SIGNIFICANT CHANGE UP
SPECIMEN SOURCE: SIGNIFICANT CHANGE UP

## 2020-05-15 DIAGNOSIS — R33.9 RETENTION OF URINE, UNSPECIFIED: ICD-10-CM

## 2020-05-15 DIAGNOSIS — R39.198 OTHER DIFFICULTIES WITH MICTURITION: ICD-10-CM

## 2020-08-07 NOTE — ED ADULT TRIAGE NOTE - TEMPERATURE IN CELSIUS (DEGREES C)
SW met with pt regarding initial OB assessment. Pt stated this is her 1st pregnancy/0-miscarriage. Pt stated lives with her boyfriend and able to perform ADL's independently. Pt stated support system is her boyfriend/Raffi. Pt stated has medicaid([employer]). Pt stated does not have WIC. SW provide pt with information on other community resources and referred to the [facility].SW faxed and scanned pt's notification of pregnancy into epic.  No other needs identified at this time.    Patty Simmons,MSW  Pager#[phone]     [phone]

## 2020-08-16 ENCOUNTER — EMERGENCY (EMERGENCY)
Facility: HOSPITAL | Age: 55
LOS: 1 days | Discharge: HOME | End: 2020-08-16
Attending: EMERGENCY MEDICINE | Admitting: EMERGENCY MEDICINE
Payer: SELF-PAY

## 2020-08-16 VITALS
HEART RATE: 90 BPM | RESPIRATION RATE: 18 BRPM | DIASTOLIC BLOOD PRESSURE: 87 MMHG | OXYGEN SATURATION: 98 % | TEMPERATURE: 98 F | SYSTOLIC BLOOD PRESSURE: 160 MMHG

## 2020-08-16 VITALS
DIASTOLIC BLOOD PRESSURE: 88 MMHG | TEMPERATURE: 98 F | HEART RATE: 81 BPM | OXYGEN SATURATION: 97 % | RESPIRATION RATE: 18 BRPM | SYSTOLIC BLOOD PRESSURE: 147 MMHG

## 2020-08-16 PROCEDURE — 99283 EMERGENCY DEPT VISIT LOW MDM: CPT | Mod: 25

## 2020-08-16 PROCEDURE — 99053 MED SERV 10PM-8AM 24 HR FAC: CPT

## 2020-08-16 PROCEDURE — 99283 EMERGENCY DEPT VISIT LOW MDM: CPT

## 2020-08-16 PROCEDURE — 51702 INSERT TEMP BLADDER CATH: CPT

## 2020-08-16 NOTE — ED PROVIDER NOTE - NSFOLLOWUPINSTRUCTIONS_ED_ALL_ED_FT
Indwelling Urinary Catheter Care, Adult  An indwelling urinary catheter is a thin tube that is put into your bladder. The tube helps to drain pee (urine) out of your body. The tube goes in through your urethra. Your urethra is where pee comes out of your body. Your pee will come out through the catheter, then it will go into a bag (drainage bag).  Take good care of your catheter so it will work well.  How to wear your catheter and bag  Supplies needed     Sticky tape (adhesive tape) or a leg strap.Alcohol wipe or soap and water (if you use tape).A clean towel (if you use tape).Large overnight bag.Smaller bag (leg bag).Wearing your catheter     Attach your catheter to your leg with tape or a leg strap.  Make sure the catheter is not pulled tight.If a leg strap gets wet, take it off and put on a dry strap.If you use tape to hold the bag on your leg:  Use an alcohol wipe or soap and water to wash your skin where the tape made it sticky before.Use a clean towel to pat-dry that skin.Use new tape to make the bag stay on your leg.Wearing your bags    You should have been given a large overnight bag.  You may wear the overnight bag in the day or night.Always have the overnight bag lower than your bladder. Do not let the bag touch the floor.Before you go to sleep, put a clean plastic bag in a wastebasket. Then hang the overnight bag inside the wastebasket.You should also have a smaller leg bag that fits under your clothes.  Always wear the leg bag below your knee.Do not wear your leg bag at night.How to care for your skin and catheter  Supplies needed     A clean washcloth.Water and mild soap.A clean towel.Caring for your skin and catheter         Clean the skin around your catheter every day:  Wash your hands with soap and water.Wet a clean washcloth in warm water and mild soap.Clean the skin around your urethra.  If you are female:  Gently spread the folds of skin around your vagina (labia).With the washcloth in your other hand, wipe the inner side of your labia on each side. Wipe from front to back.If you are male:  Pull back any skin that covers the end of your penis (foreskin).With the washcloth in your other hand, wipe your penis in small circles. Start wiping at the tip of your penis, then move away from the catheter.Move the foreskin back in place, if needed.With your free hand, hold the catheter close to where it goes into your body.  Keep holding the catheter during cleaning so it does not get pulled out.With the washcloth in your other hand, clean the catheter.  Only wipe downward on the catheter.Do not wipe upward toward your body. Doing this may push germs into your urethra and cause infection.Use a clean towel to pat-dry the catheter and the skin around it. Make sure to wipe off all soap.Wash your hands with soap and water.Shower every day. Do not take baths.Do not use cream, ointment, or lotion on the area where the catheter goes into your body, unless your doctor tells you to.Do not use powders, sprays, or lotions on your genital area.Check your skin around the catheter every day for signs of infection. Check for:  Redness, swelling, or pain.Fluid or blood.Warmth.Pus or a bad smell.How to empty the bag  Supplies needed     Rubbing alcohol.Gauze pad or cotton ball.Tape or a leg strap.Emptying the bag     Pour the pee out of your bag when it is ?–½ full, or at least 2–3 times a day. Do this for your overnight bag and your leg bag.  Wash your hands with soap and water.  Separate (detach) the bag from your leg.  Hold the bag over the toilet or a clean pail. Keep the bag lower than your hips and bladder. This is so the pee (urine) does not go back into the tube.  Open the pour spout. It is at the bottom of the bag.  Empty the pee into the toilet or pail. Do not let the pour spout touch any surface.  Put rubbing alcohol on a gauze pad or cotton ball.  Use the gauze pad or cotton ball to clean the pour spout.  Close the pour spout.  Attach the bag to your leg with tape or a leg strap.  Wash your hands with soap and water.  Follow instructions for cleaning the drainage bag:  From the product maker.As told by your doctor.How to change the bag  Supplies needed   Alcohol wipes.A clean bag.Tape or a leg strap.Changing the bag   Replace your bag when it starts to leak, smell bad, or look dirty.  Wash your hands with soap and water.  Separate the dirty bag from your leg.  Pinch the catheter with your fingers so that pee does not spill out.  Separate the catheter tube from the bag tube where these tubes connect (at the connection valve). Do not let the tubes touch any surface.  Clean the end of the catheter tube with an alcohol wipe. Use a different alcohol wipe to clean the end of the bag tube.  Connect the catheter tube to the tube of the clean bag.  Attach the clean bag to your leg with tape or a leg strap. Do not make the bag tight on your leg.  Wash your hands with soap and water.  General rules  Never pull on your catheter. Never try to take it out. Doing that can hurt you.Always wash your hands before and after you touch your catheter or bag. Use a mild, fragrance-free soap. If you do not have soap and water, use hand .Always make sure there are no twists or bends (kinks) in the catheter tube.Always make sure there are no leaks in the catheter or bag.Drink enough fluid to keep your pee pale yellow.Do not take baths, swim, or use a hot tub.If you are female, wipe from front to back after you poop (have a bowel movement).Contact a doctor if:  Your pee is cloudy.Your pee smells worse than usual.Your catheter gets clogged.Your catheter leaks.Your bladder feels full.Get help right away if:  You have redness, swelling, or pain where the catheter goes into your body.You have fluid, blood, pus, or a bad smell coming from the area where the catheter goes into your body.Your skin feels warm where the catheter goes into your body.You have a fever.You have pain in your:  Belly (abdomen).Legs.Lower back.Bladder.You see blood in the catheter.Your pee is pink or red.You feel sick to your stomach (nauseous).You throw up (vomit).You have chills.Your pee is not draining into the bag.Your catheter gets pulled out.Summary  An indwelling urinary catheter is a thin tube that is placed into the bladder to help drain pee (urine) out of the body. The catheter is placed into the part of the body that drains pee from the bladder (urethra).Taking good care of your catheter will keep it working properly and help prevent problems.Always wash your hands before and after touching your catheter or bag.Never pull on your catheter or try to take it out.

## 2020-08-16 NOTE — ED PROVIDER NOTE - PROGRESS NOTE DETAILS
Pt requesting void trial after valdez removed rather than re-insertion.  Pt given water; will monitor.  Plan for dc and outpt fu w gu regarding further mgmt of bph issues.

## 2020-08-16 NOTE — ED PROVIDER NOTE - ATTENDING CONTRIBUTION TO CARE
53 yo male h/o bph w indwelling valdez, last changed in ed 5/20 for valdez change, returns requesting valdez change.  Prior visit in Feb for same; noted to have poor gu fu on prior notes.  Pt c/o valdez being blocked.  No other complaints.  Well appearing, nad, abd soft/nt.  Pt here for valdez change; new valdez placed.

## 2020-08-16 NOTE — ED PROVIDER NOTE - NSFOLLOWUPCLINICS_GEN_ALL_ED_FT
[facility]  Urology  [address], [address]  [place], [place] [address]  Phone: [phone]  Fax:   Follow Up Time:

## 2020-08-16 NOTE — ED ADULT NURSE REASSESSMENT NOTE - NS ED NURSE REASSESS COMMENT FT1
Leg bag applied to valdez catheter. Pt dcd to home with written/verbal instructions given. Pt ambulatory with no s/s distress noted at time of departure.

## 2020-08-16 NOTE — ED PROVIDER NOTE - PATIENT PORTAL LINK FT
You can access the FollowMyHealth Patient Portal offered by [facility] by registering at the following website: [email or web address]. By joining [employer]’s FollowMyHealth portal, you will also be able to view your health information using other applications (apps) compatible with our system.

## 2020-08-16 NOTE — ED ADULT NURSE NOTE - NSIMPLEMENTINTERV_GEN_ALL_ED
Implemented All Universal Safety Interventions:  [place] to call system. Call bell, personal items and telephone within reach. Instruct patient to call for assistance. Room bathroom lighting operational. Non-slip footwear when patient is off stretcher. Physically safe environment: no spills, clutter or unnecessary equipment. Stretcher in lowest position, wheels locked, appropriate side rails in place.

## 2020-08-16 NOTE — ED PROVIDER NOTE - CLINICAL SUMMARY MEDICAL DECISION MAKING FREE TEXT BOX
pt w/hx bph, non compliant w/gu f/u, requesting valdez change - comes to ED every few mon for catheter change, no c/o, valdez changed, gu f/u encouraged

## 2020-08-16 NOTE — ED PROVIDER NOTE - OBJECTIVE STATEMENT
The pt is a 55 y/o M, who presents to ED requesting a valdez change - hx BPH, has indwelling valdez, non compliant w/gu f/u. States that noticed valdez not to be draining. Denies fevers, chills, flank pain, abd pain

## 2020-08-20 DIAGNOSIS — T83.098A OTHER MECHANICAL COMPLICATION OF OTHER URINARY CATHETER, INITIAL ENCOUNTER: ICD-10-CM
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
## 2020-09-26 ENCOUNTER — EMERGENCY (EMERGENCY)
Facility: HOSPITAL | Age: 55
LOS: 1 days | Discharge: HOME | End: 2020-09-26
Attending: EMERGENCY MEDICINE | Admitting: EMERGENCY MEDICINE
Payer: SELF-PAY

## 2020-09-26 VITALS
OXYGEN SATURATION: 99 % | HEART RATE: 85 BPM | TEMPERATURE: 99 F | SYSTOLIC BLOOD PRESSURE: 138 MMHG | DIASTOLIC BLOOD PRESSURE: 72 MMHG | RESPIRATION RATE: 17 BRPM

## 2020-09-26 VITALS
DIASTOLIC BLOOD PRESSURE: 88 MMHG | HEART RATE: 108 BPM | HEIGHT: 73 IN | SYSTOLIC BLOOD PRESSURE: 145 MMHG | TEMPERATURE: 99 F | WEIGHT: 195.11 LBS | RESPIRATION RATE: 18 BRPM | OXYGEN SATURATION: 96 %

## 2020-09-26 DIAGNOSIS — N39.0 URINARY TRACT INFECTION, SITE NOT SPECIFIED: ICD-10-CM
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
## 2020-09-26 DIAGNOSIS — T83.098A OTHER MECHANICAL COMPLICATION OF OTHER URINARY CATHETER, INITIAL ENCOUNTER: ICD-10-CM

## 2020-09-26 LAB
APPEARANCE UR: CLEAR — SIGNIFICANT CHANGE UP
BACTERIA # UR AUTO: ABNORMAL /HPF
BILIRUB UR-MCNC: NEGATIVE — SIGNIFICANT CHANGE UP
COLOR SPEC: YELLOW — SIGNIFICANT CHANGE UP
COMMENT - URINE: SIGNIFICANT CHANGE UP
DIFF PNL FLD: ABNORMAL
EPI CELLS # UR: SIGNIFICANT CHANGE UP /HPF (ref 0–5)
GLUCOSE UR QL: NEGATIVE — SIGNIFICANT CHANGE UP
KETONES UR-MCNC: ABNORMAL MG/DL
LEUKOCYTE ESTERASE UR-ACNC: ABNORMAL
NITRITE UR-MCNC: POSITIVE
PH UR: 6 — SIGNIFICANT CHANGE UP (ref 5–8)
PROT UR-MCNC: 100 MG/DL
RBC CASTS # UR COMP ASSIST: ABNORMAL /HPF
SP GR SPEC: >=1.03 — SIGNIFICANT CHANGE UP (ref 1–1.03)
UROBILINOGEN FLD QL: 0.2 E.U./DL — SIGNIFICANT CHANGE UP
WBC UR QL: ABNORMAL /HPF

## 2020-09-26 PROCEDURE — 87186 SC STD MICRODIL/AGAR DIL: CPT

## 2020-09-26 PROCEDURE — 81001 URINALYSIS AUTO W/SCOPE: CPT

## 2020-09-26 PROCEDURE — 99284 EMERGENCY DEPT VISIT MOD MDM: CPT

## 2020-09-26 PROCEDURE — 99284 EMERGENCY DEPT VISIT MOD MDM: CPT | Mod: 25

## 2020-09-26 PROCEDURE — 96365 THER/PROPH/DIAG IV INF INIT: CPT | Mod: XU

## 2020-09-26 PROCEDURE — 87086 URINE CULTURE/COLONY COUNT: CPT

## 2020-09-26 PROCEDURE — 51702 INSERT TEMP BLADDER CATH: CPT

## 2020-09-26 RX ORDER — SODIUM CHLORIDE 9 MG/ML
1000 INJECTION INTRAMUSCULAR; INTRAVENOUS; SUBCUTANEOUS ONCE
Refills: 0 | Status: COMPLETED | OUTPATIENT
Start: 2020-09-26 | End: 2020-09-26

## 2020-09-26 RX ORDER — CEFPODOXIME PROXETIL 100 MG
1 TABLET ORAL
Qty: 20 | Refills: 0
Start: 2020-09-26 | End: 2024-04-08

## 2020-09-26 RX ORDER — CEFPODOXIME PROXETIL 100 MG
1 TABLET ORAL
Qty: 20 | Refills: 0
Start: 2020-09-26 | End: 2020-10-05

## 2020-09-26 RX ORDER — CEFTRIAXONE 500 MG/1
1000 INJECTION, POWDER, FOR SOLUTION INTRAMUSCULAR; INTRAVENOUS ONCE
Refills: 0 | Status: COMPLETED | OUTPATIENT
Start: 2020-09-26 | End: 2020-09-26

## 2020-09-26 RX ADMIN — SODIUM CHLORIDE 1000 MILLILITER(S): 9 INJECTION INTRAMUSCULAR; INTRAVENOUS; SUBCUTANEOUS at 21:05

## 2020-09-26 RX ADMIN — CEFTRIAXONE 100 MILLIGRAM(S): 500 INJECTION, POWDER, FOR SOLUTION INTRAMUSCULAR; INTRAVENOUS at 21:05

## 2020-09-26 RX ADMIN — CEFTRIAXONE 1000 MILLIGRAM(S): 500 INJECTION, POWDER, FOR SOLUTION INTRAMUSCULAR; INTRAVENOUS at 21:35

## 2020-09-26 RX ADMIN — SODIUM CHLORIDE 1000 MILLILITER(S): 9 INJECTION INTRAMUSCULAR; INTRAVENOUS; SUBCUTANEOUS at 21:45

## 2020-09-26 NOTE — ED PROVIDER NOTE - CARE PROVIDER_API CALL
Aden Villarreal)  Urology  [address], [facility], [place] [address]  Phone: [phone]  Fax: [phone]  Follow Up Time:

## 2020-09-26 NOTE — ED ADULT NURSE REASSESSMENT NOTE - NS ED NURSE REASSESS COMMENT FT1
dispo. papers complete, awaiting completion of IVF's for dispo., pt. aware, with understanding verbalized, assessment on-going

## 2020-09-26 NOTE — ED PROVIDER NOTE - PATIENT PORTAL LINK FT
You can access the FollowMyHealth Patient Portal offered by [facility] by registering at the following website: [email or web address]. By joining [facility]’s FollowMyHealth portal, you will also be able to view your health information using other applications (apps) compatible with our system.

## 2020-09-26 NOTE — ED ADULT NURSE NOTE - PLAN OF CARE
Bedside visitors/Position of comfort/I and O/NPO/Fall precautions/Call bell/Explanation of exam/test

## 2020-09-26 NOTE — ED ADULT NURSE NOTE - OBJECTIVE STATEMENT
Pt reports "my catheter is not draining properly" and "I need a new catheter." Pt reports "my catheter is not draining properly" and "I need a new catheter."  no bladder distention noted, scant amount of cloudy urine with sediment noted in rt leg bag, denies fevers, n/v, or other complaint

## 2020-09-26 NOTE — ED ADULT NURSE NOTE - PMH
Benign prostatic hyperplasia, presence of lower urinary tract symptoms unspecified, unspecified morphology

## 2020-09-26 NOTE — ED ADULT NURSE REASSESSMENT NOTE - NS ED NURSE REASSESS COMMENT FT1
interventions were implemented as noted, kimberley. well, provided with ice water, per request, consuming without difficulty, assessment on-going pending ua results/further orders

## 2020-09-26 NOTE — ED PROVIDER NOTE - NSFOLLOWUPINSTRUCTIONS_ED_ALL_ED_FT
Please follow up with your urologist in 1-2 days for re evaluation.  Please return to ER immediately should your symptoms worsen or if you have any concern prior to this recommended follow up.          Jacques Catheter Placement and Care    WHAT YOU NEED TO KNOW:    A Jacques catheter is a sterile tube that is inserted into your bladder to drain urine. It is also called an indwelling urinary catheter. The tip of the catheter has a small balloon filled with solution that holds the catheter inside your bladder.                    DISCHARGE INSTRUCTIONS:    Return to the emergency department if:   •Your catheter comes out.       •You suddenly have material that looks like sand in the tubing or drainage bag.      •No urine is draining into the bag and you have checked the system.      •You have pain in your hip, back, pelvis, or lower abdomen.      •You are confused or cannot think clearly.      Call your doctor or urologist if:   •You have a fever.      •You have bladder spasms for more than 1 day after the catheter is placed.      •You see blood in the tubing or drainage bag.      •You have a rash or itching where the catheter tube is secured to your skin.      •Urine leaks from or around the catheter, tubing, or drainage bag.      •The closed drainage system has accidently come open or apart.       •You see a layer of crystals inside the tubing.      •You have questions or concerns about your condition or care.      Care for your Jacques catheter:   •Clean your genital area 2 times every day.Clean your catheter and the area around where it was inserted. Use soap and water. Clean your anal opening and catheter area after every bowel movement.       •Secure the catheter tube so you do not pull or move the catheter. This helps prevent pain and bladder spasms. Healthcare providers will show you how to use medical tape or a strap to secure the catheter tube to your body.       •Keep a closed drainage system. Your Jacques catheter should always be attached to the drainage bag to form a closed system. Do not disconnect any part of the closed system unless you need to change the bag.      Care for your drainage bag:   •Ask if a leg bag is right for you. A leg bag can be worn under your clothes. Ask your healthcare provider for more information about a leg bag.       •Keep the drainage bag below the level of your waist. This helps stop urine from moving back up the tubing and into your bladder. Do not loop or kink the tubing. This can cause urine to back up and collect in your bladder. Do not let the drainage bag touch or lie on the floor.      •Empty the drainage bag when needed. The weight of a full drainage bag can be painful. Empty the drainage bag every 3 to 6 hours or when it is ? full.       •Clean and change the drainage bag as directed. Ask your healthcare provider how often you should change the drainage bag and what cleaning solution to use. Wear disposable gloves when you change the bag. Do not allow the end of the catheter or tubing to touch anything. Clean the ends with an alcohol pad before you reconnect them.      What to do if problems develop:   •No urine is draining into the bag: ?Check for kinks in the tubing and straighten them out.       ?Check the tape or strap used to secure the catheter tube to your skin. Make sure it is not blocking the tube.       ?Make sure you are not sitting or lying on the tubing.      ?Make sure the urine bag is hanging below the level of your waist.      •Urine leaks from or around the catheter, tubing, or drainage bag: Check if the closed drainage system has accidently come open or apart. Clean the catheter and tubing ends with a new alcohol pad and reconnect them.       Prevent an infection:   •Wash your hands often. Wash before and after you touch your catheter, tubing, or drainage bag. Use soap and water. Wear clean disposable gloves when you care for your catheter or disconnect the drainage bag. Wash your hands before you prepare or eat food.   Handwashing           •Drink liquids as directed. Ask your healthcare provider how much liquid to drink each day and which liquids are best for you. Liquids will help flush your kidneys and bladder to help prevent infection.      Follow up with your doctor or urologist as directed: Write down your questions so you remember to ask them during your visits.        © Copyright [employer] 2020           back to top                          © Copyright [employer] 2020

## 2020-09-26 NOTE — ED ADULT TRIAGE NOTE - CHIEF COMPLAINT QUOTE
53 y/o male came in to ED stating that his urinary catheter is not "draining properly" Pt reports having this catheter for one month and not following up with urology. Pt denies any fever, chills.

## 2020-09-26 NOTE — ED PROVIDER NOTE - NS ED ROS FT
Constitutional: No fever or chills.   Eyes: No pain, blurry vision, or discharge.  ENMT: No hearing changes, pain, discharge or infections. No neck pain or stiffness.  Cardiac: No chest pain, SOB or edema. No chest pain with exertion.  Respiratory: No cough or respiratory distress. No hemoptysis. No history of asthma or RAD.  GI: No nausea, vomiting, diarrhea or abdominal pain.  : + Indwelling valdez catheter w difficulty in draining, + BPH, no dysuria, frequency or burning.  MS: No myalgia, muscle weakness, joint pain or back pain.  Neuro: No headache or weakness. No LOC.  Skin: No skin rash.   Endocrine: No history of thyroid disease or diabetes.  Except as documented in the HPI, all other systems are negative.

## 2020-09-26 NOTE — ED PROVIDER NOTE - CLINICAL SUMMARY MEDICAL DECISION MAKING FREE TEXT BOX
Patient in ED w concern for improperly draining valdez catheter.  Patient non toxic in appearance.  Valdez catheter replaced, draining clear yelllow urine.  Abd soft, NT/ND.  UA wnl.  Will plan for discharge home with instruction for prompt urology follow up.  Patient is advised to follow up with their urologist in 1-2 days without fail.  Patient instructed to return to ED immediately should their symptoms worsen or if there is any concern prior to the recommended urology follow up.  Patient is aware of plan and verbalizes their understanding.  Will discharge home at this time.

## 2020-09-26 NOTE — ED PROVIDER NOTE - CARE PLAN
Principal Discharge DX:	Problem with Jacques catheter, initial encounter   Principal Discharge DX:	Problem with Jacques catheter, initial encounter  Secondary Diagnosis:	Urinary tract infection without hematuria, site unspecified

## 2020-09-26 NOTE — ED PROVIDER NOTE - OBJECTIVE STATEMENT
54 year old male with history of BPH presents to ED with concern for clogged/non draining indwelling urinary catheter.  Patient notes catheter was placed apx 4 weeks ago due to urinary retention and though patient follows with Dr. Finnegan, he has not been evaluated recently.  He denies associated fever, chills, back pain, abdominal pain, cloudy appearing urine, or additional acute complaints or concerns at this time.  He notes he does require valdez catheter placement from time to time for urinary retention.

## 2020-09-26 NOTE — ED ADULT NURSE NOTE - NSIMPLEMENTINTERV_GEN_ALL_ED
Implemented All Universal Safety Interventions:  [place] to call system. Call bell, personal items and telephone within reach. Instruct patient to call for assistance. Room bathroom lighting operational. Non-slip footwear when patient is off stretcher. Physically safe environment: no spills, clutter or unnecessary equipment. Stretcher in lowest position, wheels locked, appropriate side rails in place.

## 2020-09-26 NOTE — ED ADULT NURSE NOTE - CHIEF COMPLAINT QUOTE
55 y/o male came in to ED stating that his urinary catheter is not "draining properly" Pt reports having this catheter for one month and not following up with urology. Pt denies any fever, chills.

## 2020-09-26 NOTE — ED ADULT NURSE NOTE - CHPI ED NUR SYMPTOMS NEG
no fever no abdominal distension/no blood in stool/no diarrhea/no hematuria/no nausea/no chills/no fever

## 2020-09-26 NOTE — ED ADULT NURSE REASSESSMENT NOTE - NS ED NURSE REASSESS COMMENT FT1
interventions as noted, kimberley. well,  will dispo. upon completion, Dr. [facility] back at bedside d/w pt., understanding verbalized, assessment on-going

## 2020-09-26 NOTE — ED PROVIDER NOTE - PHYSICAL EXAMINATION
VITAL SIGNS: I have reviewed nursing notes and confirm.  CONSTITUTIONAL: Well-developed; well-nourished; in no acute distress.   SKIN:  warm and dry, no acute rash.   HEAD:  normocephalic, atraumatic.  EYES: PERRL.  EOM intact; conjunctiva and sclera clear.  ENT: No nasal discharge; airway clear.   NECK: Supple; non tender.  CARD: S1, S2 normal; no murmurs, gallops, or rubs. Regular rate and rhythm.   RESP:  Clear to auscultation b/l, no wheezes, rales or rhonchi.  ABD: Normal bowel sounds; soft; non-distended; non-tender; no guarding/rebound.  :  + Indwelling valdez catheter draining clear yellow urine.    EXT: Normal ROM. No clubbing, cyanosis or edema. 2+ pulses to b/l ue/le.  NEURO: Alert, oriented, grossly unremarkable.  5/5 strength x 4 extremities against gravity and external force.  No drift x 4 extremities.  Sensation intact and symmetric x 4 extremities.  No facial asymmetry.    PSYCH: Cooperative, mood and affect appropriate.

## 2020-09-28 LAB
CULTURE RESULTS: SIGNIFICANT CHANGE UP
SPECIMEN SOURCE: SIGNIFICANT CHANGE UP

## 2020-10-02 LAB
METHOD TYPE: SIGNIFICANT CHANGE UP
METHOD TYPE: SIGNIFICANT CHANGE UP
ORGANISM # SPEC MICROSCOPIC CNT: SIGNIFICANT CHANGE UP

## 2020-12-08 NOTE — ED ADULT NURSE NOTE - NS ED NOTE ABUSE RESPONSE YN
I called Chiquita to discuss her scheduling issues.  I was not aware of her anticoagulation or mechanical valves when I requested her procedure date, and this has held things up.    She has a mechanical mitral valve, as well as aortic, and is high risk for complications from thrombosis.  Her INR is prelim reported to be above 4.    1) confirm INR  2) hold warfarin  3) start enoxaparin when her INR nears 2  4) hopefully with holding warfarin we can plan for a pleurx Friday.    I left her a message with this information and explaining my error in her procedure scheduling.    Most likely we should plan on rechecking her INR on Thursday.    Bryan Avendaño MD   Yes

## 2020-12-27 ENCOUNTER — EMERGENCY (EMERGENCY)
Facility: HOSPITAL | Age: 55
LOS: 1 days | Discharge: HOME | End: 2020-12-27
Attending: EMERGENCY MEDICINE | Admitting: EMERGENCY MEDICINE
Payer: SELF-PAY

## 2020-12-27 VITALS
HEART RATE: 85 BPM | TEMPERATURE: 98 F | OXYGEN SATURATION: 96 % | DIASTOLIC BLOOD PRESSURE: 77 MMHG | SYSTOLIC BLOOD PRESSURE: 138 MMHG | RESPIRATION RATE: 18 BRPM

## 2020-12-27 VITALS
HEART RATE: 123 BPM | RESPIRATION RATE: 18 BRPM | SYSTOLIC BLOOD PRESSURE: 156 MMHG | TEMPERATURE: 99 F | DIASTOLIC BLOOD PRESSURE: 63 MMHG | WEIGHT: 190.04 LBS | OXYGEN SATURATION: 99 % | HEIGHT: 73 IN

## 2020-12-27 DIAGNOSIS — N40.1 BENIGN PROSTATIC HYPERPLASIA WITH LOWER URINARY TRACT SYMPTOMS: ICD-10-CM

## 2020-12-27 DIAGNOSIS — R33.9 RETENTION OF URINE, UNSPECIFIED: ICD-10-CM

## 2020-12-27 LAB
APPEARANCE UR: ABNORMAL
BACTERIA # UR AUTO: ABNORMAL /HPF
BILIRUB UR-MCNC: NEGATIVE — SIGNIFICANT CHANGE UP
COLOR SPEC: YELLOW — SIGNIFICANT CHANGE UP
COMMENT - URINE: SIGNIFICANT CHANGE UP
COMMENT - URINE: SIGNIFICANT CHANGE UP
DIFF PNL FLD: ABNORMAL
EPI CELLS # UR: SIGNIFICANT CHANGE UP /HPF (ref 0–5)
GLUCOSE UR QL: NEGATIVE — SIGNIFICANT CHANGE UP
KETONES UR-MCNC: NEGATIVE — SIGNIFICANT CHANGE UP
LEUKOCYTE ESTERASE UR-ACNC: ABNORMAL
NITRITE UR-MCNC: POSITIVE
PH UR: 6 — SIGNIFICANT CHANGE UP (ref 5–8)
PROT UR-MCNC: 100 MG/DL
RBC CASTS # UR COMP ASSIST: ABNORMAL /HPF
SP GR SPEC: >=1.03 — SIGNIFICANT CHANGE UP (ref 1–1.03)
UROBILINOGEN FLD QL: 0.2 E.U./DL — SIGNIFICANT CHANGE UP
WBC UR QL: ABNORMAL /HPF

## 2020-12-27 PROCEDURE — 99283 EMERGENCY DEPT VISIT LOW MDM: CPT | Mod: 25

## 2020-12-27 PROCEDURE — 51702 INSERT TEMP BLADDER CATH: CPT

## 2020-12-27 PROCEDURE — 87086 URINE CULTURE/COLONY COUNT: CPT

## 2020-12-27 PROCEDURE — 87186 SC STD MICRODIL/AGAR DIL: CPT

## 2020-12-27 PROCEDURE — 81001 URINALYSIS AUTO W/SCOPE: CPT

## 2020-12-27 PROCEDURE — 99284 EMERGENCY DEPT VISIT MOD MDM: CPT

## 2020-12-27 RX ORDER — CEFPODOXIME PROXETIL 100 MG
1 TABLET ORAL
Qty: 14 | Refills: 0
Start: 2020-12-27 | End: 2021-01-02

## 2020-12-27 RX ORDER — TAMSULOSIN HYDROCHLORIDE 0.4 MG/1
1 CAPSULE ORAL
Qty: 14 | Refills: 0
Start: 2020-12-27 | End: 2021-01-09

## 2020-12-27 NOTE — ED PROVIDER NOTE - OBJECTIVE STATEMENT
55 yo hx of BPH w chronic indwelling valdez with urinary retention for 4 hours. states has catheter changed normally every two weeks, but now has it in for 2 months. no abd pain, flank pain, f/c, gross hematuria, cp, sob, cough. has been trying herbal supplements to help with BPH. no other complaints.

## 2020-12-27 NOTE — ED ADULT NURSE NOTE - OBJECTIVE STATEMENT
53 yo M presents to Ed co clogged valdez catheter. Says he is supposed to get it changed every 2 weeks and has not had it changed in 2 months. co pelvic pressure saying the catheter has not drained urine in a few hours. Small amount of yellow urine in drainage bag. AOx3, speaking in full sentences. Denies fevers, chills.

## 2020-12-27 NOTE — ED PROVIDER NOTE - PHYSICAL EXAMINATION

## 2020-12-27 NOTE — ED ADULT NURSE REASSESSMENT NOTE - NS ED NURSE REASSESS COMMENT FT1
Existing Jacques catheter removed. 16Fr Jacques catheter inserted using sterile technique, draining by gravity, secured with StatLock. Second RN present to confirm sterility.

## 2020-12-27 NOTE — ED PROVIDER NOTE - CLINICAL SUMMARY MEDICAL DECISION MAKING FREE TEXT BOX
Pt w urinary retention last 4-5 hours s/p relief w valdez, brielle obtain UA given cloudy appearance, afebrile, no  SIRS criteria, dc w urology, flomax. Pt w urinary retention last 4-5 hours s/p relief w valdez, will obtain UA given cloudy appearance, afebrile, no abd pain, CVAT, dc w urology fu, flomax.

## 2020-12-27 NOTE — ED PROVIDER NOTE - PATIENT PORTAL LINK FT
You can access the FollowMyHealth Patient Portal offered by [facility] by registering at the following website: [email or web address]. By joining [employer]’s FollowMyHealth portal, you will also be able to view your health information using other applications (apps) compatible with our system.

## 2020-12-27 NOTE — ED PROVIDER NOTE - NSFOLLOWUPINSTRUCTIONS_ED_ALL_ED_FT
Can take tylenol every 6hrs as needed for pain.  Stay well hydrated.  Return for fevers, persistent vomit, uncontrolled pain, worsening breathing, worsening lightheaded, unable to urinate, spreading redness.  Follow up with primary doctor within 1-2 days.   Follow up with urologist within 2 days to assess for valdez catheter removal (prolonged use can cause infections and other complications). Can call [phone] to schedule appointment.     What is urinary retention?   Urinary retention is a condition that develops when your bladder does not empty completely when you urinate.    What causes urinary retention?     An enlarged prostate  Blockages, such as a stone, growth, or narrowing of your urethra  A weak bladder muscle  Nerve damage from diabetes, stroke, or spinal cord injury  Bladder diverticula, which are pockets of urine that form in your bladder and do not empty  Certain medicines, such as narcotics, antihistamines, or antidepressants    What are the signs and symptoms of urinary retention?     Frequent urination, or the urge to urinate right after you finish  An urge to urinate, but your urine does not come out or dribbles out slowly and weakly  Frequent urine leaks that happen during the day or while you sleep  Pain or pressure when you urinate  Pain or stiffness in your abdomen, lower back, hips, or upper thighs  Blood in your urine    How is urinary retention diagnosed? Your healthcare provider will ask about your health history and the medicines you take. He will press or tap on your lower abdomen. You may need any of the following tests:   A digital rectal exam is when healthcare providers carefully feel the size of your prostate.  A post void residual test will show how much urine is left in your bladder after you urinate. You will be asked to urinate and then healthcare providers will use a small ultrasound machine to check how much urine is left in your bladder.  Blood or urine tests may show infection or prostate specific antigen (PSA) levels. PSA may be elevated in prostate cancer.  An ultrasound uses sound waves to show pictures on a monitor. An ultrasound may be done to show bladder stones, infection, or other problems.  A CT scan, or CAT scan, is a type of x-ray that is taken of your prostate, kidneys, and bladder. The pictures may show what is causing your urinary retention. You may be given a dye before the pictures are taken to help healthcare providers see the pictures better. Tell the healthcare provider if you have ever had an allergic reaction to contrast dye.    How is urinary retention treated?     A Valdez catheter is a tube put into your bladder to drain urine into a bag. Keep the bag below your waist. This will prevent urine from flowing back into your bladder and causing an infection or other problems. Also, keep the tube free of kinks so the urine will drain properly. Do not pull on the catheter. This can cause pain and bleeding, and may cause the catheter to come out.     Medicines can help decrease the size of your prostate, fight infection, and help you urinate more easily.  Surgery may be needed to treat the condition that is causing your urinary retention.     When should I contact my healthcare provider?     You have a fever.  You have pain when you urinate.  You have blood in your urine.  You have problems with your catheter.  You have questions or concerns about your condition or care.    When should I seek immediate care or call 911?   You have severe abdominal pain.  You are breathing faster than usual.  Your heartbeat is faster than usual.  Your face, hands, feet, or ankles are swollen.

## 2021-01-04 LAB
-  AMPICILLIN/SULBACTAM: SIGNIFICANT CHANGE UP
-  AMPICILLIN/SULBACTAM: SIGNIFICANT CHANGE UP
-  AMPICILLIN: SIGNIFICANT CHANGE UP
-  AMPICILLIN: SIGNIFICANT CHANGE UP
-  CEFAZOLIN: SIGNIFICANT CHANGE UP
-  CEFAZOLIN: SIGNIFICANT CHANGE UP
-  CEFTRIAXONE: SIGNIFICANT CHANGE UP
-  CEFTRIAXONE: SIGNIFICANT CHANGE UP
-  CIPROFLOXACIN: SIGNIFICANT CHANGE UP
-  CIPROFLOXACIN: SIGNIFICANT CHANGE UP
-  GENTAMICIN: SIGNIFICANT CHANGE UP
-  GENTAMICIN: SIGNIFICANT CHANGE UP
-  MEROPENEM: SIGNIFICANT CHANGE UP
-  MEROPENEM: SIGNIFICANT CHANGE UP
-  NITROFURANTOIN: SIGNIFICANT CHANGE UP
-  NITROFURANTOIN: SIGNIFICANT CHANGE UP
-  PIPERACILLIN/TAZOBACTAM: SIGNIFICANT CHANGE UP
-  PIPERACILLIN/TAZOBACTAM: SIGNIFICANT CHANGE UP
-  TOBRAMYCIN: SIGNIFICANT CHANGE UP
-  TOBRAMYCIN: SIGNIFICANT CHANGE UP
-  TRIMETHOPRIM/SULFAMETHOXAZOLE: SIGNIFICANT CHANGE UP
-  TRIMETHOPRIM/SULFAMETHOXAZOLE: SIGNIFICANT CHANGE UP
CULTURE RESULTS: SIGNIFICANT CHANGE UP
METHOD TYPE: SIGNIFICANT CHANGE UP
METHOD TYPE: SIGNIFICANT CHANGE UP
ORGANISM # SPEC MICROSCOPIC CNT: SIGNIFICANT CHANGE UP
SPECIMEN SOURCE: SIGNIFICANT CHANGE UP

## 2021-01-14 ENCOUNTER — EMERGENCY (EMERGENCY)
Facility: HOSPITAL | Age: 56
LOS: 1 days | Discharge: HOME | End: 2021-01-14
Attending: EMERGENCY MEDICINE | Admitting: EMERGENCY MEDICINE
Payer: SELF-PAY

## 2021-01-14 VITALS
RESPIRATION RATE: 20 BRPM | OXYGEN SATURATION: 100 % | DIASTOLIC BLOOD PRESSURE: 97 MMHG | HEART RATE: 126 BPM | TEMPERATURE: 99 F | HEIGHT: 73 IN | SYSTOLIC BLOOD PRESSURE: 137 MMHG

## 2021-01-14 VITALS
HEART RATE: 90 BPM | OXYGEN SATURATION: 100 % | RESPIRATION RATE: 18 BRPM | SYSTOLIC BLOOD PRESSURE: 135 MMHG | DIASTOLIC BLOOD PRESSURE: 82 MMHG | TEMPERATURE: 98 F

## 2021-01-14 DIAGNOSIS — T83.098A OTHER MECHANICAL COMPLICATION OF OTHER URINARY CATHETER, INITIAL ENCOUNTER: ICD-10-CM
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
## 2021-01-14 LAB
APPEARANCE UR: CLEAR — SIGNIFICANT CHANGE UP
BACTERIA # UR AUTO: PRESENT /HPF
BILIRUB UR-MCNC: NEGATIVE — SIGNIFICANT CHANGE UP
COLOR SPEC: YELLOW — SIGNIFICANT CHANGE UP
DIFF PNL FLD: ABNORMAL
EPI CELLS # UR: SIGNIFICANT CHANGE UP /HPF (ref 0–5)
GLUCOSE UR QL: NEGATIVE — SIGNIFICANT CHANGE UP
KETONES UR-MCNC: ABNORMAL MG/DL
LEUKOCYTE ESTERASE UR-ACNC: ABNORMAL
NITRITE UR-MCNC: POSITIVE
PH UR: 6.5 — SIGNIFICANT CHANGE UP (ref 5–8)
PROT UR-MCNC: 100 MG/DL
RBC CASTS # UR COMP ASSIST: ABNORMAL /HPF
SP GR SPEC: 1.02 — SIGNIFICANT CHANGE UP (ref 1–1.03)
UROBILINOGEN FLD QL: 1 E.U./DL — SIGNIFICANT CHANGE UP
WBC UR QL: < 5 /HPF — SIGNIFICANT CHANGE UP

## 2021-01-14 PROCEDURE — 81001 URINALYSIS AUTO W/SCOPE: CPT

## 2021-01-14 PROCEDURE — 87086 URINE CULTURE/COLONY COUNT: CPT

## 2021-01-14 PROCEDURE — 99053 MED SERV 10PM-8AM 24 HR FAC: CPT

## 2021-01-14 PROCEDURE — 99283 EMERGENCY DEPT VISIT LOW MDM: CPT

## 2021-01-14 PROCEDURE — 99284 EMERGENCY DEPT VISIT MOD MDM: CPT

## 2021-01-14 PROCEDURE — 87186 SC STD MICRODIL/AGAR DIL: CPT

## 2021-01-14 PROCEDURE — 93005 ELECTROCARDIOGRAM TRACING: CPT

## 2021-01-14 PROCEDURE — 93010 ELECTROCARDIOGRAM REPORT: CPT

## 2021-01-14 NOTE — ED ADULT TRIAGE NOTE - LOCATION:
Fariba called to get a refill of Diltiazem 240mg / 30 days / [facility] in [facility] [address].    
Refill request completed.   
Left arm;

## 2021-01-14 NOTE — ED PROVIDER NOTE - CLINICAL SUMMARY MEDICAL DECISION MAKING FREE TEXT BOX
pmhx bph with chronic indewlling valdez presenting to the ed for malfunctioning valdez tubing as well as need for valdez exchange. states changed every 2 weeks. follows with Dr. Villarreal. states intermittent blood in in the urine/ dx uti 12/27 but has not taken antibiotics. no fevers chills chest pain palpitations cough sob nasuea vomiting abdominal pain flank/back pain. pmhx bph with chronic indwelling valdez presenting to the ed for malfunctioning valdez tubing as well as need for valdez exchange. states changed every 2 weeks. follows with Dr. Villarreal. states intermittent blood in in the urine/ dx uti 12/27 but has not taken antibiotics. no fevers chills chest pain palpitations cough sob nausea vomiting abdominal pain flank/back pain. PE patient appears well, non-toxic. valdez changed. ua appears infected but pt has history of colonization and does not endorse urinary symptoms. ED evaluation and management discussed with the patient and family (if available) in detail.  Close PMD follow up encouraged.  Strict ED return instructions discussed in detail and patient given the opportunity to ask any questions about their discharge diagnosis and instructions. Patient verbalized understanding. Patient is agreeable to plan.

## 2021-01-14 NOTE — ED ADULT NURSE REASSESSMENT NOTE - NS ED NURSE REASSESS COMMENT FT1
Patient A&Ox4, ambulatory, in no acute distress, valdez bag replaced with leg bag. Discharge instructions provided, patient verbalized understanding.

## 2021-01-14 NOTE — ED PROVIDER NOTE - ATTENDING CONTRIBUTION TO CARE
as per HPI. Jacques exchanged. Pt now asymptomatic. Initial tachycardia now improved, other vitals wnl. Exam as above.  UA+, chronically colonized, has no specific urinary symptoms. Will hold on tx for now, can f/u w/ .   Clinically no indication for further emergent ED workup or hospitalization at this time. Comfortable for dc, outpt f/u.

## 2021-01-14 NOTE — ED ADULT NURSE NOTE - OTHER COMPLAINTS
pt states a bag on his valdez cath has been coming loose this morning, pt reports hx of enlarged prostate with valdez in place x a few year, also reports occasional scant blood in urine but no pain/fever/n/v, denies any other medical hx

## 2021-01-14 NOTE — ED PROVIDER NOTE - PATIENT PORTAL LINK FT
You can access the FollowMyHealth Patient Portal offered by [facility] by registering at the following website: [email or web address]. By joining [employer]’s FollowMyHealth portal, you will also be able to view your health information using other applications (apps) compatible with our system.

## 2021-01-14 NOTE — ED PROVIDER NOTE - NSFOLLOWUPINSTRUCTIONS_ED_ALL_ED_FT
please follow up with Dr. Villarreal                 Log Out.      [employer]® CareNotes®     :  [facility]  	                       HERNANDEZ CATHETER PLACEMENT AND CARE - Discharge Care           Hernandez Catheter Placement and Care    WHAT YOU NEED TO KNOW:    A Hernandez catheter is a sterile tube that is inserted into your bladder to drain urine. It is also called an indwelling urinary catheter.     DISCHARGE INSTRUCTIONS:    Seek care immediately if:   •Your catheter comes out.       •You suddenly have material that looks like sand in the tubing or drainage bag.      •No urine is draining into the bag and you have checked the system.      •You have pain in your hip, back, pelvis, or lower abdomen.      •You are confused or cannot think clearly.      Call your doctor or urologist if:   •You have a fever.      •You have bladder spasms for more than 1 day after the catheter is placed.      •You see blood in the tubing or drainage bag.      •You have a rash or itching where the catheter tube is secured to your skin.      •Urine leaks from or around the catheter, tubing, or drainage bag.      •The closed drainage system has accidently come open or apart.       •You see a layer of crystals inside the tubing.      •You have questions or concerns about your condition or care.      Care for your catheter and drainage bag: You can reduce your risk for infection and injury by caring for your catheter and drainage bag properly.  •Wash your hands often. Wash before and after you touch your catheter, tubing, or drainage bag. Use soap and water. Wear clean disposable gloves when you care for your catheter or disconnect the drainage bag. Wash your hands before you prepare or eat food.   Handwashing           •Clean your genital area 2 times every day. Clean your catheter area and anal opening after every bowel movement. ?For men: Use a soapy cloth to clean the tip of your penis. Start where the catheter enters. Wipe backward making sure to pull back the foreskin. Then use a cloth with clear water in the same direction to clean away the soap.       ?For women: Use a soapy cloth to clean the area that the catheter enters your body. Make sure to separate your labia and wipe toward the anus. Then use a cloth with clear water and wipe in the same direction.       •Secure the catheter tube so you do not pull or move the catheter. This helps prevent pain and bladder spasms. Healthcare providers will show you how to use medical tape or a strap to secure the catheter tube to your body.       •Keep a closed drainage system. Your catheter should always be attached to the drainage bag to form a closed system. Do not disconnect any part of the closed system unless you need to change the bag.      •Keep the drainage bag below the level of your waist. This helps stop urine from moving back up the tubing and into your bladder. Do not loop or kink the tubing. This can cause urine to back up and collect in your bladder. Do not let the drainage bag touch or lie on the floor.      •Empty the drainage bag when needed. The weight of a full drainage bag can be painful. Empty the drainage bag every 3 to 6 hours or when it is ? full.       •Clean and change the drainage bag as directed. Ask your healthcare provider how often you should change the drainage bag and what cleaning solution to use. Wear disposable gloves when you change the bag. Do not allow the end of the catheter or tubing to touch anything. Clean the ends with an alcohol pad before you reconnect them.      What to do if problems develop:   •No urine is draining into the bag: ?Check for kinks in the tubing and straighten them out.       ?Check the tape or strap used to secure the catheter tube to your skin. Make sure it is not blocking the tube.       ?Make sure you are not sitting or lying on the tubing.      ?Make sure the urine bag is hanging below the level of your waist.      •Urine leaks from or around the catheter, tubing, or drainage bag: Check if the closed drainage system has accidently come open or apart. Clean the catheter and tubing ends with a new alcohol pad and reconnect them.       Follow up with your doctor or urologist as directed: Write down your questions so you remember to ask them during your visits.        © Copyright [employer] 2021           back to top                          © Copyright [employer] 2021

## 2021-01-14 NOTE — ED ADULT NURSE NOTE - OBJECTIVE STATEMENT
Patient to the ED with complaint that the his Jacques needs to be changed because it is dirty and the bag is falling off, denies any fever chills, bladder or bowel issues.

## 2021-01-14 NOTE — ED PROVIDER NOTE - OBJECTIVE STATEMENT
pmhx bph with chronic indewlling valdez presenting to the ed for malfunctioning valdez tubing as well as need for valdez exchange. states changed every 2 weeks. follows with Dr. Villarreal. states intermittent blood in in the urine/ dx uti 12/27 but has not taken antibiotics. no fevers chills chest pain palpitations cough sob nasuea vomiting abdominal pain flank/back pain.

## 2021-01-14 NOTE — ED ADULT TRIAGE NOTE - OTHER COMPLAINTS
pt states a bag on his valdez cath has been coming loose this morning, pt reports hx of enlarged prostate with valedz in place x a few year, also reports occasional scant blood in urine but no pain/fever/n/v, denies any other medical hx

## 2021-01-14 NOTE — ED PROVIDER NOTE - PHYSICAL EXAMINATION
General: Patient is well developed and well nourished. Patient is alert and oriented to person, place and date. Patient is sitting stretcher and appears in no acute distress.  Heart: Regular rate and rhythm. No murmurs, rubs or gallops.   Lungs: Clear to auscultation bilaterally with equal chest expansion. No note of wheezes, rhonchi, rales. Equal chest expansion. No note of retractions.  Abdomen: Bowel sounds present in all four quadrants. Soft, non-tender, non-distended without signs of masses, rebound or guarding. No note of hepatosplenomegaly. No CVA tenderness bilaterally. Negative Ulloa sign or McBurney's.  :   	MALE: no evidence or rashes, ulcers, nodules or scars. No evidence of discharge, scrotal masses or hernias    Neuro: CGCS 15. gait steady   Skin: Warm, dry and intact without evidence of rashes, bruising, pallor, jaundice or cyanosis.   Psych: Mood and affect appropriate. General: Patient is well developed and well nourished. Patient is alert and oriented to person, place and date. Patient is sitting stretcher and appears in no acute distress.  Heart: Regular rate and rhythm. No murmurs, rubs or gallops.   Lungs: Clear to auscultation bilaterally with equal chest expansion. No note of wheezes, rhonchi, rales. Equal chest expansion. No note of retractions.  Abdomen: Bowel sounds present in all four quadrants. Soft, non-tender, non-distended without signs of masses, rebound or guarding. No note of hepatosplenomegaly. No CVA tenderness bilaterally. Negative Ulloa sign or McBurney's.  :   	MALE: no evidence or rashes, ulcers, nodules or scars. No evidence of discharge, scrotal masses or hernias. valdez in place.   Neuro: CGCS 15. gait steady   Skin: Warm, dry and intact without evidence of rashes, bruising, pallor, jaundice or cyanosis.   Psych: Mood and affect appropriate.

## 2021-01-17 LAB
-  AMPICILLIN/SULBACTAM: SIGNIFICANT CHANGE UP
-  AMPICILLIN/SULBACTAM: SIGNIFICANT CHANGE UP
-  AMPICILLIN: SIGNIFICANT CHANGE UP
-  AMPICILLIN: SIGNIFICANT CHANGE UP
-  CEFAZOLIN: SIGNIFICANT CHANGE UP
-  CEFAZOLIN: SIGNIFICANT CHANGE UP
-  CEFTRIAXONE: SIGNIFICANT CHANGE UP
-  CEFTRIAXONE: SIGNIFICANT CHANGE UP
-  CIPROFLOXACIN: SIGNIFICANT CHANGE UP
-  CIPROFLOXACIN: SIGNIFICANT CHANGE UP
-  ERTAPENEM: SIGNIFICANT CHANGE UP
-  ERTAPENEM: SIGNIFICANT CHANGE UP
-  GENTAMICIN: SIGNIFICANT CHANGE UP
-  GENTAMICIN: SIGNIFICANT CHANGE UP
-  MEROPENEM: SIGNIFICANT CHANGE UP
-  NITROFURANTOIN: SIGNIFICANT CHANGE UP
-  NITROFURANTOIN: SIGNIFICANT CHANGE UP
-  PIPERACILLIN/TAZOBACTAM: SIGNIFICANT CHANGE UP
-  PIPERACILLIN/TAZOBACTAM: SIGNIFICANT CHANGE UP
-  TOBRAMYCIN: SIGNIFICANT CHANGE UP
-  TOBRAMYCIN: SIGNIFICANT CHANGE UP
-  TRIMETHOPRIM/SULFAMETHOXAZOLE: SIGNIFICANT CHANGE UP
-  TRIMETHOPRIM/SULFAMETHOXAZOLE: SIGNIFICANT CHANGE UP
METHOD TYPE: SIGNIFICANT CHANGE UP
METHOD TYPE: SIGNIFICANT CHANGE UP

## 2021-01-17 NOTE — ED POST DISCHARGE NOTE - DETAILS
Pt feels well, he still has not picked up bactrim Rx that was sent on 1/11. he will pick it up and start taking it. he was instructed to f/u with his urologist or return to the ER for any concerning or worsening sx.

## 2021-01-18 LAB
-  AMPICILLIN: SIGNIFICANT CHANGE UP
-  CIPROFLOXACIN: SIGNIFICANT CHANGE UP
-  LEVOFLOXACIN: SIGNIFICANT CHANGE UP
-  NITROFURANTOIN: SIGNIFICANT CHANGE UP
-  TETRACYCLINE: SIGNIFICANT CHANGE UP
-  VANCOMYCIN: SIGNIFICANT CHANGE UP
CULTURE RESULTS: SIGNIFICANT CHANGE UP
METHOD TYPE: SIGNIFICANT CHANGE UP
ORGANISM # SPEC MICROSCOPIC CNT: SIGNIFICANT CHANGE UP
SPECIMEN SOURCE: SIGNIFICANT CHANGE UP

## 2021-02-18 NOTE — ED ADULT NURSE NOTE - NS ED PATIENT SAFETY CONCERN
[facility]  OPERATIVE REPORT    Name:  Nasra Rossi  MR#:  [id number]  :  1957  ACCOUNT #:  [de-identified]  DATE OF SERVICE:  2021    PREOPERATIVE DIAGNOSIS:  Right proximal ureteral stone. POSTOPERATIVE DIAGNOSIS:  Right proximal ureteral stone. PROCEDURE PERFORMED:  Right extracorporeal shockwave lithotripsy. SURGEON:  Dario Black DO    ASSISTANT:  None. ANESTHESIA:  General.    COMPLICATIONS:  None immediate. SPECIMENS REMOVED:  None. IMPLANTS:  None. ESTIMATED BLOOD LOSS:  None. CLINICAL HISTORY:  This is a 27-year-old female who I have previously seen for a history of stones. She recently presented with right flank pain. A CT scan on 02/15/2021 shows a 4-mm right proximal ureteral stone. All risks, benefits and alternatives to the above-mentioned procedure have been reviewed and she is willing to proceed at this time. DESCRIPTION OF PROCEDURE:  Patient consent was obtained. The patient was brought back to the operating room at which time she was placed in the supine position. The right proximal ureteral stone was localized under fluoroscopy. After the uneventful induction of general anesthesia, the stone was then localized under the X, Y and Z axes using fluoroscopy. A total of 3000 shocks were administered to the stone. The initial 400 shocks were administered at a rate of 60 shocks per minute and the subsequent 2600 shocks were administered at a rate of 90 shocks per minute. The stone did appear to fragment nicely throughout the case. The patient tolerated the procedure well. We will have her follow up in the office in 2 weeks for a postoperative KUB.         DO VERA [id number]  D:  2021 15:23  T:  2021 0:14  JOB #:  [id number] No

## 2021-04-22 NOTE — ED ADULT NURSE NOTE - PMH
8185 Benign prostatic hyperplasia, presence of lower urinary tract symptoms unspecified, unspecified morphology

## 2021-05-03 ENCOUNTER — EMERGENCY (EMERGENCY)
Facility: HOSPITAL | Age: 56
LOS: 1 days | Discharge: HOME | End: 2021-05-03
Attending: EMERGENCY MEDICINE | Admitting: EMERGENCY MEDICINE
Payer: SELF-PAY

## 2021-05-03 VITALS
DIASTOLIC BLOOD PRESSURE: 89 MMHG | HEIGHT: 73 IN | WEIGHT: 190.04 LBS | TEMPERATURE: 98 F | HEART RATE: 102 BPM | SYSTOLIC BLOOD PRESSURE: 183 MMHG | RESPIRATION RATE: 16 BRPM | OXYGEN SATURATION: 99 %

## 2021-05-03 VITALS
TEMPERATURE: 98 F | HEART RATE: 80 BPM | RESPIRATION RATE: 18 BRPM | OXYGEN SATURATION: 100 % | SYSTOLIC BLOOD PRESSURE: 153 MMHG | DIASTOLIC BLOOD PRESSURE: 83 MMHG

## 2021-05-03 DIAGNOSIS — Z87.440 PERSONAL HISTORY OF URINARY (TRACT) INFECTIONS: ICD-10-CM
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
## 2021-05-03 DIAGNOSIS — Z79.2 LONG TERM (CURRENT) USE OF ANTIBIOTICS: ICD-10-CM

## 2021-05-03 DIAGNOSIS — N39.0 URINARY TRACT INFECTION, SITE NOT SPECIFIED: ICD-10-CM

## 2021-05-03 DIAGNOSIS — N40.0 BENIGN PROSTATIC HYPERPLASIA WITHOUT LOWER URINARY TRACT SYMPTOMS: ICD-10-CM

## 2021-05-03 DIAGNOSIS — T83.091A OTHER MECHANICAL COMPLICATION OF INDWELLING URETHRAL CATHETER, INITIAL ENCOUNTER: ICD-10-CM
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
## 2021-05-03 DIAGNOSIS — Z46.6 ENCOUNTER FOR FITTING AND ADJUSTMENT OF URINARY DEVICE: ICD-10-CM
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
## 2021-05-03 LAB
APPEARANCE UR: CLEAR — SIGNIFICANT CHANGE UP
BACTERIA # UR AUTO: PRESENT /HPF
BILIRUB UR-MCNC: NEGATIVE — SIGNIFICANT CHANGE UP
COLOR SPEC: YELLOW — SIGNIFICANT CHANGE UP
DIFF PNL FLD: ABNORMAL
EPI CELLS # UR: SIGNIFICANT CHANGE UP /HPF (ref 0–5)
GLUCOSE UR QL: NEGATIVE — SIGNIFICANT CHANGE UP
KETONES UR-MCNC: NEGATIVE — SIGNIFICANT CHANGE UP
LEUKOCYTE ESTERASE UR-ACNC: ABNORMAL
NITRITE UR-MCNC: POSITIVE
PH UR: 6 — SIGNIFICANT CHANGE UP (ref 5–8)
PROT UR-MCNC: NEGATIVE MG/DL — SIGNIFICANT CHANGE UP
RBC CASTS # UR COMP ASSIST: ABNORMAL /HPF
SP GR SPEC: 1.02 — SIGNIFICANT CHANGE UP (ref 1–1.03)
UROBILINOGEN FLD QL: 0.2 E.U./DL — SIGNIFICANT CHANGE UP
WBC UR QL: < 5 /HPF — SIGNIFICANT CHANGE UP

## 2021-05-03 PROCEDURE — 99283 EMERGENCY DEPT VISIT LOW MDM: CPT | Mod: 25

## 2021-05-03 PROCEDURE — 87086 URINE CULTURE/COLONY COUNT: CPT

## 2021-05-03 PROCEDURE — 99283 EMERGENCY DEPT VISIT LOW MDM: CPT

## 2021-05-03 PROCEDURE — 87186 SC STD MICRODIL/AGAR DIL: CPT

## 2021-05-03 PROCEDURE — 51702 INSERT TEMP BLADDER CATH: CPT

## 2021-05-03 PROCEDURE — 81001 URINALYSIS AUTO W/SCOPE: CPT

## 2021-05-03 RX ORDER — CEFPODOXIME PROXETIL 100 MG
1 TABLET ORAL
Qty: 14 | Refills: 0
Start: 2021-05-03 | End: 2021-05-09

## 2021-05-03 RX ORDER — CEFPODOXIME PROXETIL 100 MG
200 TABLET ORAL ONCE
Refills: 0 | Status: COMPLETED | OUTPATIENT
Start: 2021-05-03 | End: 2021-05-03

## 2021-05-03 RX ADMIN — Medication 200 MILLIGRAM(S): at 12:06

## 2021-05-03 NOTE — ED ADULT TRIAGE NOTE - CHIEF COMPLAINT QUOTE
Pt w/ hx of BPH w/ indwelling valdez catheter reports catheter not draining for past 4 hours. Pt reports mild suprapubic pain. Denies fever, chills, hematuria.

## 2021-05-03 NOTE — ED PROVIDER NOTE - CLINICAL SUMMARY MEDICAL DECISION MAKING FREE TEXT BOX
uti indwelling cath  no urine out put over past 4 hrs pt distended -  will replace-  dw   hx klebsiella UTI  will Rx cefprodoxome  pt did not take antibiotics last time he was seen and eval - was Rx bactrim 54 yo male with obstructed valdez cath- unable to flush -- changed in ED by RN  (14 Fr valdez )uti indwelling cath --pt states no urine in bag over past 4 hrs pt distended - dw   hx klebsiella UTI  will Rx cefprodoxime  pt did not take antibiotics last time he was seen and eval - was Rx bactrim

## 2021-05-03 NOTE — ED ADULT NURSE NOTE - NS ED NURSE RECORD ANOTHER VITAL SIGN
Yes

## 2021-05-03 NOTE — ED PROVIDER NOTE - CARE PROVIDER_API CALL
Aden Villarreal)  Urology  [address], [address]  [place], [place] [address]  Phone: [phone]  Fax: [phone]  Follow Up Time: 1-3 Days

## 2021-05-03 NOTE — ED PROVIDER NOTE - OBJECTIVE STATEMENT
56 yo male with hx BPH, chronic indwelling valdez freq UTIs presents with " clogged" valdez x 4 hrs  req it to be changed  (has 14 Fr valdez)  no flank pain or N/V  no fevers or chills kimberley po well

## 2021-05-03 NOTE — ED ADULT NURSE REASSESSMENT NOTE - NS ED NURSE REASSESS COMMENT FT1
Urinary catheter replaced , existing catheter removed and size 14 valdez catheter inserted ,drained 200mls urine , pt comfortable

## 2021-05-03 NOTE — ED PROVIDER NOTE - PATIENT PORTAL LINK FT
You can access the FollowMyHealth Patient Portal offered by [facility] by registering at the following website: [email or web address]. By joining [employer]’s FollowMyHealth portal, you will also be able to view your health information using other applications (apps) compatible with our system.

## 2021-05-03 NOTE — ED PROVIDER NOTE - CARE PLAN
Principal Discharge DX:	UTI (urinary tract infection)  Secondary Diagnosis:	Indwelling catheter present on admission  Secondary Diagnosis:	Indwelling catheter replaced  Secondary Diagnosis:	Benign prostatic hyperplasia, presence of lower urinary tract symptoms unspecified, unspecified morphology

## 2021-05-03 NOTE — ED ADULT NURSE NOTE - OBJECTIVE STATEMENT
pt has indwelling urinary catheter which has not been draining for about 4 hours, having slight pelvic discomfort

## 2021-05-03 NOTE — ED PROVIDER NOTE - SECONDARY DIAGNOSIS.
Indwelling catheter replaced Benign prostatic hyperplasia, presence of lower urinary tract symptoms unspecified, unspecified morphology Indwelling catheter present on admission

## 2021-05-03 NOTE — ED ADULT NURSE NOTE - NSIMPLEMENTINTERV_GEN_ALL_ED
Implemented All Universal Safety Interventions:  [place] to call system. Call bell, personal items and telephone within reach. Instruct patient to call for assistance. Room bathroom lighting operational. Non-slip footwear when patient is off stretcher. Physically safe environment: no spills, clutter or unnecessary equipment. Stretcher in lowest position, wheels locked, appropriate side rails in place.

## 2021-05-05 LAB
-  AMPICILLIN/SULBACTAM: SIGNIFICANT CHANGE UP
-  AMPICILLIN/SULBACTAM: SIGNIFICANT CHANGE UP
-  AMPICILLIN: SIGNIFICANT CHANGE UP
-  AMPICILLIN: SIGNIFICANT CHANGE UP
-  CEFAZOLIN: SIGNIFICANT CHANGE UP
-  CEFAZOLIN: SIGNIFICANT CHANGE UP
-  CEFTRIAXONE: SIGNIFICANT CHANGE UP
-  CEFTRIAXONE: SIGNIFICANT CHANGE UP
-  CIPROFLOXACIN: SIGNIFICANT CHANGE UP
-  CIPROFLOXACIN: SIGNIFICANT CHANGE UP
-  ERTAPENEM: SIGNIFICANT CHANGE UP
-  ERTAPENEM: SIGNIFICANT CHANGE UP
-  GENTAMICIN: SIGNIFICANT CHANGE UP
-  GENTAMICIN: SIGNIFICANT CHANGE UP
-  MEROPENEM: SIGNIFICANT CHANGE UP
-  MEROPENEM: SIGNIFICANT CHANGE UP
-  NITROFURANTOIN: SIGNIFICANT CHANGE UP
-  NITROFURANTOIN: SIGNIFICANT CHANGE UP
-  PIPERACILLIN/TAZOBACTAM: SIGNIFICANT CHANGE UP
-  PIPERACILLIN/TAZOBACTAM: SIGNIFICANT CHANGE UP
-  TOBRAMYCIN: SIGNIFICANT CHANGE UP
-  TOBRAMYCIN: SIGNIFICANT CHANGE UP
-  TRIMETHOPRIM/SULFAMETHOXAZOLE: SIGNIFICANT CHANGE UP
-  TRIMETHOPRIM/SULFAMETHOXAZOLE: SIGNIFICANT CHANGE UP
CULTURE RESULTS: SIGNIFICANT CHANGE UP
METHOD TYPE: SIGNIFICANT CHANGE UP
METHOD TYPE: SIGNIFICANT CHANGE UP
ORGANISM # SPEC MICROSCOPIC CNT: SIGNIFICANT CHANGE UP
SPECIMEN SOURCE: SIGNIFICANT CHANGE UP

## 2021-05-05 NOTE — ED POST DISCHARGE NOTE - DETAILS
+urine c/s, changed abx , Rx sent to pt's pharmacy and message left for pt pt called - message left, needs to be notified of change in abx/tx

## 2021-07-20 NOTE — ED ADULT NURSE NOTE - CAS DISCH TRANSFER METHOD
[facility] Care Coordination Contact      [facility] Six-Month Telephone Assessment    6 month telephone assessment completed on 07/20/21.    ER visits: No  Hospitalizations: Yes -  [facility]  TCU stays: No  Significant health status changes: None  Falls/Injuries: No  ADL/IADL changes: No  Changes in services: No    Caregiver Assessment follow up:  None    Goals: See POC in chart for goal progress documentation.  Mariusz is doing well.  He just had knee replacement and is doing well at home.  He has no new needs at this time.    Will see member in 6 months for an annual health risk assessment.   Encouraged member to call CC with any questions or concerns in the meantime.   JANNETH Gonzalez  [facility]  Phone: [phone]           Transportation service

## 2021-08-14 ENCOUNTER — EMERGENCY (EMERGENCY)
Facility: HOSPITAL | Age: 56
LOS: 1 days | Discharge: HOME | End: 2021-08-14
Attending: EMERGENCY MEDICINE | Admitting: EMERGENCY MEDICINE
Payer: SELF-PAY

## 2021-08-14 VITALS
SYSTOLIC BLOOD PRESSURE: 140 MMHG | WEIGHT: 190.04 LBS | HEIGHT: 73 IN | TEMPERATURE: 99 F | OXYGEN SATURATION: 98 % | HEART RATE: 97 BPM | DIASTOLIC BLOOD PRESSURE: 89 MMHG | RESPIRATION RATE: 18 BRPM

## 2021-08-14 VITALS
HEART RATE: 82 BPM | TEMPERATURE: 98 F | RESPIRATION RATE: 18 BRPM | DIASTOLIC BLOOD PRESSURE: 92 MMHG | OXYGEN SATURATION: 98 % | SYSTOLIC BLOOD PRESSURE: 158 MMHG

## 2021-08-14 DIAGNOSIS — R33.9 RETENTION OF URINE, UNSPECIFIED: ICD-10-CM

## 2021-08-14 LAB
APPEARANCE UR: CLEAR — SIGNIFICANT CHANGE UP
BACTERIA # UR AUTO: ABNORMAL /HPF
BILIRUB UR-MCNC: NEGATIVE — SIGNIFICANT CHANGE UP
COLOR SPEC: YELLOW — SIGNIFICANT CHANGE UP
COMMENT - URINE: SIGNIFICANT CHANGE UP
DIFF PNL FLD: ABNORMAL
EPI CELLS # UR: SIGNIFICANT CHANGE UP /HPF (ref 0–5)
GLUCOSE UR QL: NEGATIVE — SIGNIFICANT CHANGE UP
KETONES UR-MCNC: NEGATIVE — SIGNIFICANT CHANGE UP
LEUKOCYTE ESTERASE UR-ACNC: ABNORMAL
NITRITE UR-MCNC: POSITIVE
PH UR: 6 — SIGNIFICANT CHANGE UP (ref 5–8)
PROT UR-MCNC: NEGATIVE MG/DL — SIGNIFICANT CHANGE UP
RBC CASTS # UR COMP ASSIST: < 5 /HPF — SIGNIFICANT CHANGE UP
SP GR SPEC: 1.02 — SIGNIFICANT CHANGE UP (ref 1–1.03)
UROBILINOGEN FLD QL: 0.2 E.U./DL — SIGNIFICANT CHANGE UP
WBC UR QL: > 10 /HPF

## 2021-08-14 PROCEDURE — 81001 URINALYSIS AUTO W/SCOPE: CPT

## 2021-08-14 PROCEDURE — 87086 URINE CULTURE/COLONY COUNT: CPT

## 2021-08-14 PROCEDURE — 99284 EMERGENCY DEPT VISIT MOD MDM: CPT

## 2021-08-14 PROCEDURE — 99283 EMERGENCY DEPT VISIT LOW MDM: CPT | Mod: 25

## 2021-08-14 PROCEDURE — 51702 INSERT TEMP BLADDER CATH: CPT

## 2021-08-14 NOTE — ED ADULT NURSE NOTE - OBJECTIVE STATEMENT
Pt AOX4. Pt c/o urinary retention for 1 day. Pt reports removing his Jacques cathter at home yesterday after doing a self rectal examine and feeling that his prostate was no longer enlarged. since removal pt unable to urinate. Pt denies fevers, chills, n/v, SOB, chest pain, numbness, tingling, weakness, dizziness. Pt speaking in full complete sentences. Respirations even and unlabored.

## 2021-08-14 NOTE — ED ADULT NURSE NOTE - CAS EDN DISCHARGE ASSESSMENT
pt valdez catheter attached to leg bag. Pt verbalized understanding on how to change/Alert and oriented to person, place and time

## 2021-08-14 NOTE — ED PROVIDER NOTE - NSFOLLOWUPCLINICS_GEN_ALL_ED_FT
[facility] - Urology Clinic  Urology  [address], 3rd Floor  [place], [place] [address]  Phone: [phone]  Fax:

## 2021-08-14 NOTE — ED PROVIDER NOTE - NSFOLLOWUPINSTRUCTIONS_ED_ALL_ED_FT
Urinary Retention in Men    WHAT YOU NEED TO KNOW:    Urinary retention is a condition that develops when your bladder does not empty completely when you urinate.     Male Reproductive System         DISCHARGE INSTRUCTIONS:    Medicines:   •Medicines can help decrease the size of your prostate, fight infection, and help you urinate more easily.      •Take your medicine as directed. Contact your healthcare provider if you think your medicine is not helping or if you have side effects. Tell him or her if you are allergic to any medicine. Keep a list of the medicines, vitamins, and herbs you take. Include the amounts, and when and why you take them. Bring the list or the pill bottles to follow-up visits. Carry your medicine list with you in case of an emergency.      Jacques catheter care: You may need a Jacques catheter for up to 2 weeks at home. Healthcare providers will give you a smaller leg bag to collect urine. Keep the bag below your waist. This will prevent urine from flowing back into your bladder and causing an infection or other problems. Also, keep the tube free of kinks so the urine will drain properly. Do not pull on the catheter. This can cause pain and bleeding, and may cause the catheter to come out. Ask your healthcare provider or urologist for more information on Jacques catheter care.    Urinate regularly: When your catheter is removed, do not let your bladder become too full before you urinate. Set regular times each day to urinate. Urinate as soon as you feel the need or at least every 3 hours while you are awake. Do not drink liquids before you go to bed. Urinate right before you go to bed.    Follow up with your healthcare provider or urologist as directed: Write down your questions so you remember to ask them during your visits.     Contact your healthcare provider or urologist if:   •You have a fever.      •You have pain when you urinate.      •You have blood in your urine.      •You have problems with your catheter.      •You have questions or concerns about your condition or care.      Return to the emergency department if:   •You have severe abdominal pain.      •You are breathing faster than usual.      •Your heartbeat is faster than usual.      •Your face, hands, feet, or ankles are swollen.

## 2021-08-14 NOTE — ED PROVIDER NOTE - CLINICAL SUMMARY MEDICAL DECISION MAKING FREE TEXT BOX
54 y/o m hx BPH presents in urinary retention after removing his valdez catheter last night; pt afebrile, vss, valdez placed with +clear urine output.  U/a with +nitrite and leuks, will treat with bactrim determined by previous urine cultures.  Pt d/c, advised to stop performing his own GOLDEN, to f/u with a urologist and advised to take flomax.

## 2021-08-14 NOTE — ED ADULT TRIAGE NOTE - CHIEF COMPLAINT QUOTE
Pt reports he has had an indwelling catheter for 2 months to manage enlarged prostate. Pt removed his own catheter 3 hrs pta and has not been able to urinate since.

## 2021-08-14 NOTE — ED PROVIDER NOTE - ATTENDING CONTRIBUTION TO CARE
Attending Statement: I have personally performed a face to face diagnostic evaluation on this patient. I have reviewed the ACP note and agree with the history, exam and plan of care, except as noted.     Attending Contribution to Care:    54 y/o m hx BPH presents in urinary retention after removing his valdez catheter last night; pt afebrile, vss, valdez placed with +clear urine output.  U/a with +nitrite and leuks, will treat with bactrim determined by previous urine cultures.  Pt d/c, advised to stop performing his own GOLDEN, to f/u with a urologist and advised to take flomax. Stable for DC.

## 2021-08-14 NOTE — ED PROVIDER NOTE - OBJECTIVE STATEMENT
56 y/o m hx BPH with chronic indwelling valdez presents stating he removed the valdez yesterday as a home TOV and hasn't urinated since, asking for another valdez.  Pt reports he was treating his BPH with herbal supplement and has been doing his own GOLDEN routinely in the bath and thought his prostate has decreased in size from his exam so wanted a TOV.  Pt reports suprapubic discomfort today.  Pt reports had been told 2 months ago he has a UTI but never took the antibiotics and asking for another prescription today.  Denies fever, chills, n/v, all other ROS negative.

## 2021-08-14 NOTE — ED PROVIDER NOTE - PATIENT PORTAL LINK FT
You can access the FollowMyHealth Patient Portal offered by [facility] by registering at the following website: [email or web address]. By joining [employer]’s FollowMyHealth portal, you will also be able to view your health information using other applications (apps) compatible with our system.

## 2021-08-15 LAB
CULTURE RESULTS: SIGNIFICANT CHANGE UP
SPECIMEN SOURCE: SIGNIFICANT CHANGE UP

## 2021-08-21 ENCOUNTER — EMERGENCY (EMERGENCY)
Facility: HOSPITAL | Age: 56
LOS: 1 days | Discharge: HOME | End: 2021-08-21
Attending: EMERGENCY MEDICINE | Admitting: EMERGENCY MEDICINE
Payer: SELF-PAY

## 2021-08-21 VITALS
OXYGEN SATURATION: 97 % | DIASTOLIC BLOOD PRESSURE: 73 MMHG | TEMPERATURE: 99 F | HEIGHT: 73 IN | HEART RATE: 104 BPM | WEIGHT: 190.04 LBS | SYSTOLIC BLOOD PRESSURE: 164 MMHG | RESPIRATION RATE: 18 BRPM

## 2021-08-21 DIAGNOSIS — R31.9 HEMATURIA, UNSPECIFIED: ICD-10-CM

## 2021-08-21 DIAGNOSIS — Z87.430 PERSONAL HISTORY OF PROSTATIC DYSPLASIA: ICD-10-CM

## 2021-08-21 DIAGNOSIS — R33.9 RETENTION OF URINE, UNSPECIFIED: ICD-10-CM

## 2021-08-21 LAB
APPEARANCE UR: CLEAR — SIGNIFICANT CHANGE UP
BACTERIA # UR AUTO: PRESENT /HPF
BILIRUB UR-MCNC: NEGATIVE — SIGNIFICANT CHANGE UP
COLOR SPEC: YELLOW — SIGNIFICANT CHANGE UP
COMMENT - URINE: SIGNIFICANT CHANGE UP
DIFF PNL FLD: ABNORMAL
EPI CELLS # UR: SIGNIFICANT CHANGE UP /HPF (ref 0–5)
GLUCOSE UR QL: NEGATIVE — SIGNIFICANT CHANGE UP
KETONES UR-MCNC: 15 MG/DL
LEUKOCYTE ESTERASE UR-ACNC: ABNORMAL
NITRITE UR-MCNC: POSITIVE
PH UR: 6 — SIGNIFICANT CHANGE UP (ref 5–8)
PROT UR-MCNC: NEGATIVE MG/DL — SIGNIFICANT CHANGE UP
RBC CASTS # UR COMP ASSIST: ABNORMAL /HPF
SP GR SPEC: 1.02 — SIGNIFICANT CHANGE UP (ref 1–1.03)
UROBILINOGEN FLD QL: 0.2 E.U./DL — SIGNIFICANT CHANGE UP
WBC UR QL: > 10 /HPF

## 2021-08-21 PROCEDURE — 99283 EMERGENCY DEPT VISIT LOW MDM: CPT

## 2021-08-21 PROCEDURE — 87086 URINE CULTURE/COLONY COUNT: CPT

## 2021-08-21 PROCEDURE — 99284 EMERGENCY DEPT VISIT MOD MDM: CPT

## 2021-08-21 PROCEDURE — 81001 URINALYSIS AUTO W/SCOPE: CPT

## 2021-08-21 PROCEDURE — 51702 INSERT TEMP BLADDER CATH: CPT

## 2021-08-21 NOTE — ED PROVIDER NOTE - ATTENDING CONTRIBUTION TO CARE
54 yo male h/o BPH, chronic urinary retention w indwelling valdez c/o poor drainage from valdez and pus at meatus.  Pt seen 1 wk ago and had valdez replaced after he failed a TOV; diagnosed w uti but did not take bactrim prescribed.  No fever, flank pain, abd pain.  Well appearing, nad, nc/at, lung cta, heart reg, abd soft, nt, valdez in place w blood at meatus, no blood in tubing or bag, no purulent discharge, penis/testes nontender, no mass, ext no gross deformity, no gross neuro deficits  Pt c/o valdez issues - plan labs, pt instructed to fill and take the bactrim prescribed last week; likely dc to fu w gu.

## 2021-08-21 NOTE — ED PROVIDER NOTE - CONSTITUTIONAL, MLM
CBC with Differential:    Lab Results   Component Value Date    WBC 5.5 12/06/2020    RBC 2.52 12/06/2020    HGB 7.1 12/06/2020    HCT 23.2 12/06/2020    PLT 19 12/06/2020    MCV 92.1 12/06/2020    MCH 28.2 12/06/2020    MCHC 30.6 12/06/2020    RDW 16.8 12/06/2020    NRBC 4.6 12/06/2020    SEGSPCT 62 12/29/2011    BLASTSPCT 0.9 11/21/2020    METASPCT 0.9 12/06/2020    LYMPHOPCT 10.8 12/06/2020    PROMYELOPCT 0.9 11/21/2020    MONOPCT 0.9 12/06/2020    MYELOPCT 0.9 12/06/2020    BASOPCT 0.4 12/06/2020    MONOSABS 0.06 12/06/2020    LYMPHSABS 0.61 12/06/2020    EOSABS 0.05 12/06/2020    BASOSABS 0.00 12/06/2020     CMP:    Lab Results   Component Value Date     12/06/2020    K 3.2 12/06/2020    K 4.4 12/04/2020    CL 99 12/06/2020    CO2 34 12/06/2020    BUN 60 12/06/2020    CREATININE 1.2 12/06/2020    GFRAA >60 12/06/2020    LABGLOM >60 12/06/2020    GLUCOSE 125 12/06/2020    GLUCOSE 120 12/31/2011    PROT 5.4 12/06/2020    LABALBU 3.4 12/06/2020    LABALBU 4.5 12/28/2011    CALCIUM 9.3 12/06/2020    BILITOT 0.9 12/06/2020    ALKPHOS 140 12/06/2020     12/06/2020    ALT 81 12/06/2020       Imaging Studies:  CXR 12/06/20, No interval change.  Complete opacification left mid and upper lung.  Correlate bronchoscopy as warranted to exclude endobronchial obstruction. Resident's Assessment and Plan     Davon Sirena is a 61 y.o. male with past medical history of STEMI s/p 5 stents type 2 diabetes mellitus, chronic low back pain, hypertension, abdominal aortic aneurysm s/p endovascular repair admitted to [facility] on November 17 with shortness of breath and low platelet count. Patient was found to have CAP and small cell carcinoma. Currently intubated. Patient is transferred to [facility] for trach and PEG/palliative radiation.     Neurology   Patient is sedated with fentanyl and Versed  Not responsive to verbal stimuli  Monitor mentation    Cardiology  Vital signs stable, not on pressor, monitor, on ICU telemetry    CAD status post 5 stents  Home Meds aspirin and Plavix and Lipitor  Hold aspirin and Plavix due to patient's thrombocytopenia    History of aortic aneurysm  Status post endovascular repair  Stable, monitor    Pulmonology  Acute hypoxic hypercapnic respiratory failure secondary to obstructive pneumonia in the setting of small cell carcinoma of the lung  Patient is a transfer from [facility] post 1 trial of extubation, desaturations of there after, reintubated and same day  ACVC 20/510/55%/5, ABG 7.46/47.8/62.3/33.2  Candidate for trach and PEG, pending procedure availability likely next Monday  Candidate of palliative radiation therapy. ICU team opined that pt may benefit from radiation therapy that is likely the primary reason of obstruction. Continue bronchodilator  Monitor    Obstructive pneumonia in the setting of small cell carcinoma of the lung  Patient with small cell carcinoma with left upper lobe opacity with large left pleural effusion along with mediastinal lymphadenopathy. His CT scan of abdomen and pelvis did not show evidence of intra-abdominal metastasis. Respiratory cultures gram-negative clifford. Probable Pseudomonas. On cefepime, ID following patient      Small cell carcinoma of the lung  CTA showing a mass, no obvious mucous plugging on the left upper lobe. Very edematous and compressed airway. Biopsies immunoprofile compatible with small cell carcinoma of the lung  Patient with small cell carcinoma with left upper lobe opacity with large left pleural effusion along with mediastinal lymphadenopathy. His CT scan of abdomen and pelvis did not show evidence of intra-abdominal metastasis. Candidate of palliative radiation therapy. ICU team opined that pt may benefit from radiation therapy that is likely the primary reason of obstruction. Some nursing staffing issues with the urgent radiation therapy scheduled for this Friday.   Hematology oncology following, recommend palliative care given poor diagnosis  Radiation oncology following the patient, rec No treatment over the weekend      Nephrology  ISIDRO stage II, likely prerenal secondary to diuresis,   December 5, creatinine 1.2 BUN 62  Bumex drip 0.5, monitor renal function    Natremia, resolved    Hypokalemia, resolved    Hematology and oncology  Thrombocytopenia, secondary to underlying sepsis/bacteremia versus myelophthisis secondary to malignancy  Status post transfusion of platelets x2  Hematology following  Consider peripheral blood smear, and BM for differential diagnosis if pt improves  Platelet 20, transfusion target 10, will coordinated with surgical procedures trach and peg and thoracentesis for blood platelet transfusion    Normocytic anemia, secondary to underlying sepsis/bacteremia versus myelophthisis secondary to malignancy  Status post PRBC transfusion  Hb stable, 7.4  Follow-up folate B12 and iron panel      Endocrinology  Type 2 diabetes  On Lantus 40 nightly. High-dose sliding scale. Continue monitor blood glucose every 4 hour. DVT/GI prophy: lovenox/pepcid  Disposition plan: Continue current management        Salome Stevens MD, PGY-1    Attending physician: Dr. Umer Zayas    I personally saw, examined and provided care for the patient. Radiographs, labs and medication list were reviewed by me independently. I spoke with bedside nursing, therapists and consultants. Critical care services and times documented are independent of procedures and multidisciplinary rounds with Residents. Additionally comprehensive, multidisciplinary rounds were conducted with the MICU team. The case was discussed in detail and plans for care were established. Review of Residents documentation was conducted and revisions were made as appropriate. I agree with the above documented exam, problem list and plan of care.   SEE [email or web address].  CCT 41 min Well appearing, awake, alert, oriented to person, place, time/situation and in no apparent distress. normal...

## 2021-08-21 NOTE — ED PROVIDER NOTE - NSFOLLOWUPINSTRUCTIONS_ED_ALL_ED_FT
Jacques Catheter Placement and Care    WHAT YOU NEED TO KNOW:    A Jacques catheter is a sterile tube that is inserted into your bladder to drain urine. It is also called an indwelling urinary catheter.     DISCHARGE INSTRUCTIONS:    Return to the emergency department if:   •Your catheter comes out.       •You suddenly have material that looks like sand in the tubing or drainage bag.      •No urine is draining into the bag and you have checked the system.      •You have pain in your hip, back, pelvis, or lower abdomen.      •You are confused or cannot think clearly.      Call your doctor or urologist if:   •You have a fever.      •You have bladder spasms for more than 1 day after the catheter is placed.      •You see blood in the tubing or drainage bag.      •You have a rash or itching where the catheter tube is secured to your skin.      •Urine leaks from or around the catheter, tubing, or drainage bag.      •The closed drainage system has accidently come open or apart.       •You see a layer of crystals inside the tubing.      •You have questions or concerns about your condition or care.      Care for your catheter and drainage bag: You can reduce your risk for infection and injury by caring for your catheter and drainage bag properly.  •Wash your hands often. Wash before and after you touch your catheter, tubing, or drainage bag. Use soap and water. Wear clean disposable gloves when you care for your catheter or disconnect the drainage bag. Wash your hands before you prepare or eat food.   Handwashing           •Clean your genital area 2 times every day. Clean your catheter area and anal opening after every bowel movement. ?For men: Use a soapy cloth to clean the tip of your penis. Start where the catheter enters. Wipe backward making sure to pull back the foreskin. Then use a cloth with clear water in the same direction to clean away the soap.       ?For women: Use a soapy cloth to clean the area that the catheter enters your body. Make sure to separate your labia and wipe toward the anus. Then use a cloth with clear water and wipe in the same direction.       •Secure the catheter tube so you do not pull or move the catheter. This helps prevent pain and bladder spasms. Healthcare providers will show you how to use medical tape or a strap to secure the catheter tube to your body.       •Keep a closed drainage system. Your catheter should always be attached to the drainage bag to form a closed system. Do not disconnect any part of the closed system unless you need to change the bag.      •Keep the drainage bag below the level of your waist. This helps stop urine from moving back up the tubing and into your bladder. Do not loop or kink the tubing. This can cause urine to back up and collect in your bladder. Do not let the drainage bag touch or lie on the floor.      •Empty the drainage bag when needed. The weight of a full drainage bag can be painful. Empty the drainage bag every 3 to 6 hours or when it is ? full.       •Clean and change the drainage bag as directed. Ask your healthcare provider how often you should change the drainage bag and what cleaning solution to use. Wear disposable gloves when you change the bag. Do not allow the end of the catheter or tubing to touch anything. Clean the ends with an alcohol pad before you reconnect them.      What to do if problems develop:   •No urine is draining into the bag: ?Check for kinks in the tubing and straighten them out.       ?Check the tape or strap used to secure the catheter tube to your skin. Make sure it is not blocking the tube.       ?Make sure you are not sitting or lying on the tubing.      ?Make sure the urine bag is hanging below the level of your waist.      •Urine leaks from or around the catheter, tubing, or drainage bag: Check if the closed drainage system has accidently come open or apart. Clean the catheter and tubing ends with a new alcohol pad and reconnect them.       Follow up with your doctor or urologist as directed: Write down your questions so you remember to ask them during your visits.

## 2021-08-21 NOTE — ED ADULT NURSE NOTE - OBJECTIVE STATEMENT
55 year old M patient, A+OX3, ambulatory w steady gait presents to ED w c/o "I took my valdez catheter out bc it was draining around the site".  Denies abd pain.  Denies n/v/d/f.

## 2021-08-21 NOTE — ED PROVIDER NOTE - PATIENT PORTAL LINK FT
You can access the FollowMyHealth Patient Portal offered by [facility] by registering at the following website: [email or web address]. By joining [employer]’s FollowMyHealth portal, you will also be able to view your health information using other applications (apps) compatible with our system.

## 2021-08-21 NOTE — ED ADULT TRIAGE NOTE - CHIEF COMPLAINT QUOTE
patient c/o hematuria after removing his valdez catheter this am at 6:30am, also having constipation for 2 days .

## 2021-08-21 NOTE — ED PROVIDER NOTE - CLINICAL SUMMARY MEDICAL DECISION MAKING FREE TEXT BOX
54 y/o m hx BPH with chronic urinary retention presents c/o urinating small amounts blood tinged after removing valdez today.  VSS in ED, valdez placed with yellow nonbloody urine in bag.  Urine with evidence of infection, pt never filled abx prescribed last week, last culture appeared contaminated.  Will recommend pt fill rx for bactrim, f/u urology, return to ED if sx worsen.

## 2021-08-21 NOTE — ED PROVIDER NOTE - OBJECTIVE STATEMENT
56 y/o m hx BPH, chronic urinary retention presents stating his valdez was not draining well over the past day so he removed it this morning.  Pt stating since then  he has urinated small amounts and has been blood tinged.  Pt stating he thinks he needs the valdez to be replaced.  Pt was seen 1 week ago for similar sx, urine appeared dirty at that time and was given prescription for bactrim which he hasn't filled yet.  Denies fever, chills, abd pain, flank pain, n/v, all other ROS negative.

## 2021-08-21 NOTE — ED PROVIDER NOTE - NSFOLLOWUPCLINICS_GEN_ALL_ED_FT
[facility] - Urology Clinic  Urology  [address], [address]  [place], [place] [address]  Phone: [phone]  Fax:

## 2021-08-23 LAB
CULTURE RESULTS: SIGNIFICANT CHANGE UP
SPECIMEN SOURCE: SIGNIFICANT CHANGE UP

## 2021-08-27 ENCOUNTER — EMERGENCY (EMERGENCY)
Facility: HOSPITAL | Age: 56
LOS: 1 days | Discharge: HOME | End: 2021-08-27
Attending: EMERGENCY MEDICINE | Admitting: EMERGENCY MEDICINE
Payer: SELF-PAY

## 2021-08-27 VITALS
TEMPERATURE: 100 F | HEIGHT: 73 IN | DIASTOLIC BLOOD PRESSURE: 68 MMHG | OXYGEN SATURATION: 96 % | SYSTOLIC BLOOD PRESSURE: 126 MMHG | RESPIRATION RATE: 18 BRPM | HEART RATE: 111 BPM | WEIGHT: 190.04 LBS

## 2021-08-27 VITALS
RESPIRATION RATE: 17 BRPM | SYSTOLIC BLOOD PRESSURE: 118 MMHG | HEART RATE: 92 BPM | DIASTOLIC BLOOD PRESSURE: 70 MMHG | TEMPERATURE: 99 F | OXYGEN SATURATION: 97 %

## 2021-08-27 DIAGNOSIS — N40.0 BENIGN PROSTATIC HYPERPLASIA WITHOUT LOWER URINARY TRACT SYMPTOMS: ICD-10-CM

## 2021-08-27 DIAGNOSIS — R31.9 HEMATURIA, UNSPECIFIED: ICD-10-CM

## 2021-08-27 LAB
ALBUMIN SERPL ELPH-MCNC: 4.7 G/DL — SIGNIFICANT CHANGE UP (ref 3.3–5)
ALP SERPL-CCNC: 69 U/L — SIGNIFICANT CHANGE UP (ref 40–120)
ALT FLD-CCNC: 7 U/L — LOW (ref 10–45)
ANION GAP SERPL CALC-SCNC: 12 MMOL/L — SIGNIFICANT CHANGE UP (ref 5–17)
APPEARANCE UR: ABNORMAL
AST SERPL-CCNC: 14 U/L — SIGNIFICANT CHANGE UP (ref 10–40)
BASOPHILS # BLD AUTO: 0.02 K/UL — SIGNIFICANT CHANGE UP (ref 0–0.2)
BASOPHILS NFR BLD AUTO: 0.3 % — SIGNIFICANT CHANGE UP (ref 0–2)
BILIRUB SERPL-MCNC: 1.1 MG/DL — SIGNIFICANT CHANGE UP (ref 0.2–1.2)
BILIRUB UR-MCNC: ABNORMAL
BUN SERPL-MCNC: 16 MG/DL — SIGNIFICANT CHANGE UP (ref 7–23)
CALCIUM SERPL-MCNC: 9.8 MG/DL — SIGNIFICANT CHANGE UP (ref 8.4–10.5)
CHLORIDE SERPL-SCNC: 104 MMOL/L — SIGNIFICANT CHANGE UP (ref 96–108)
CO2 SERPL-SCNC: 24 MMOL/L — SIGNIFICANT CHANGE UP (ref 22–31)
COLOR SPEC: ABNORMAL
CREAT SERPL-MCNC: 1.25 MG/DL — SIGNIFICANT CHANGE UP (ref 0.5–1.3)
DIFF PNL FLD: ABNORMAL
EOSINOPHIL # BLD AUTO: 0 K/UL — SIGNIFICANT CHANGE UP (ref 0–0.5)
EOSINOPHIL NFR BLD AUTO: 0 % — SIGNIFICANT CHANGE UP (ref 0–6)
GLUCOSE SERPL-MCNC: 97 MG/DL — SIGNIFICANT CHANGE UP (ref 70–99)
GLUCOSE UR QL: NEGATIVE — SIGNIFICANT CHANGE UP
HCT VFR BLD CALC: 39 % — SIGNIFICANT CHANGE UP (ref 39–50)
HGB BLD-MCNC: 12.9 G/DL — LOW (ref 13–17)
IMM GRANULOCYTES NFR BLD AUTO: 0.3 % — SIGNIFICANT CHANGE UP (ref 0–1.5)
KETONES UR-MCNC: ABNORMAL MG/DL
LEUKOCYTE ESTERASE UR-ACNC: ABNORMAL
LYMPHOCYTES # BLD AUTO: 0.87 K/UL — LOW (ref 1–3.3)
LYMPHOCYTES # BLD AUTO: 12.3 % — LOW (ref 13–44)
MCHC RBC-ENTMCNC: 29.9 PG — SIGNIFICANT CHANGE UP (ref 27–34)
MCHC RBC-ENTMCNC: 33.1 GM/DL — SIGNIFICANT CHANGE UP (ref 32–36)
MCV RBC AUTO: 90.3 FL — SIGNIFICANT CHANGE UP (ref 80–100)
MONOCYTES # BLD AUTO: 0.37 K/UL — SIGNIFICANT CHANGE UP (ref 0–0.9)
MONOCYTES NFR BLD AUTO: 5.2 % — SIGNIFICANT CHANGE UP (ref 2–14)
NEUTROPHILS # BLD AUTO: 5.8 K/UL — SIGNIFICANT CHANGE UP (ref 1.8–7.4)
NEUTROPHILS NFR BLD AUTO: 81.9 % — HIGH (ref 43–77)
NITRITE UR-MCNC: POSITIVE
NRBC # BLD: 0 /100 WBCS — SIGNIFICANT CHANGE UP (ref 0–0)
PH UR: 8 — SIGNIFICANT CHANGE UP (ref 5–8)
PLATELET # BLD AUTO: 153 K/UL — SIGNIFICANT CHANGE UP (ref 150–400)
POTASSIUM SERPL-MCNC: 4.4 MMOL/L — SIGNIFICANT CHANGE UP (ref 3.5–5.3)
POTASSIUM SERPL-SCNC: 4.4 MMOL/L — SIGNIFICANT CHANGE UP (ref 3.5–5.3)
PROT SERPL-MCNC: 8 G/DL — SIGNIFICANT CHANGE UP (ref 6–8.3)
PROT UR-MCNC: >=300 MG/DL
RBC # BLD: 4.32 M/UL — SIGNIFICANT CHANGE UP (ref 4.2–5.8)
RBC # FLD: 12.9 % — SIGNIFICANT CHANGE UP (ref 10.3–14.5)
SODIUM SERPL-SCNC: 140 MMOL/L — SIGNIFICANT CHANGE UP (ref 135–145)
SP GR SPEC: 1.02 — SIGNIFICANT CHANGE UP (ref 1–1.03)
UROBILINOGEN FLD QL: 2 E.U./DL
WBC # BLD: 7.08 K/UL — SIGNIFICANT CHANGE UP (ref 3.8–10.5)
WBC # FLD AUTO: 7.08 K/UL — SIGNIFICANT CHANGE UP (ref 3.8–10.5)

## 2021-08-27 PROCEDURE — 99284 EMERGENCY DEPT VISIT MOD MDM: CPT

## 2021-08-27 PROCEDURE — 81001 URINALYSIS AUTO W/SCOPE: CPT

## 2021-08-27 PROCEDURE — 80053 COMPREHEN METABOLIC PANEL: CPT

## 2021-08-27 PROCEDURE — 87186 SC STD MICRODIL/AGAR DIL: CPT

## 2021-08-27 PROCEDURE — 87086 URINE CULTURE/COLONY COUNT: CPT

## 2021-08-27 PROCEDURE — 85025 COMPLETE CBC W/AUTO DIFF WBC: CPT

## 2021-08-27 PROCEDURE — 36415 COLL VENOUS BLD VENIPUNCTURE: CPT

## 2021-08-27 PROCEDURE — 99283 EMERGENCY DEPT VISIT LOW MDM: CPT

## 2021-08-27 NOTE — ED ADULT TRIAGE NOTE - CHIEF COMPLAINT QUOTE
Pt presents reporting hx of UTI on penicillin for the past 6 days. Began noting blood in the urine this morning. Jacques catheter in place from 6 days ago. NO blood thinners, not lightheaded.

## 2021-08-27 NOTE — ED ADULT NURSE REASSESSMENT NOTE - NS ED NURSE REASSESS COMMENT FT1
report was received from off-going RN, pt. nad, sitting up in bed at present, urine was collected and sent, assessment on-going, awaiting results/further orders

## 2021-08-27 NOTE — ED PROVIDER NOTE - PHYSICAL EXAMINATION
GEN: Well appearing, well developed, awake, alert, oriented to person, place, time/situation and in no apparent distress. NTAF  ENT: Airway patent, Nasal mucosa clear. Mouth with normal mucosa.  EYES: Clear bilaterally. PERRL, EOMI  RESPIRATORY: Breathing comfortably with normal RR. No W/C/R, no hypoxia or resp distress.  CARDIAC: Regular rate and rhythm, no M/R/G  ABDOMEN: Soft, nontender, +bowel sounds, no rebound, rigidity, or guarding. No CVA T  Jacques with blood tinged urine in bag.  MSK: Range of motion is not limited, no deformities noted.  NEURO: Alert and oriented, no focal deficits.  SKIN: Skin normal color for race, warm, dry and intact. No evidence of rash.  PSYCH: Alert and oriented to person, place, time/situation. normal mood and affect. no apparent risk to self or others.

## 2021-08-27 NOTE — ED PROVIDER NOTE - CLINICAL SUMMARY MEDICAL DECISION MAKING FREE TEXT BOX
55M with a hp BPh and chronic indwelling valdez who p/w hematuria in valdez bag. Pt not sure if his catheter was tugged or yanked. He denies pain, no f/c, no flank pain. of note, pt with multiple ED visits for Valdez related complaints. He admits he has not followed up with urology clinic as instructed but states he will do so this week or next.     On exam, well-appearing, afebrile, no cva t or abd TTP. Valdez was flushed and now draining clear. Labs checked and wnl. UA likely contaminated/colonized. UCx sent. Pt advised again to f/u at urology clinic for more definitive management of his BPH.  Stable for DC.

## 2021-08-27 NOTE — ED PROVIDER NOTE - PATIENT PORTAL LINK FT
You can access the FollowMyHealth Patient Portal offered by [facility] by registering at the following website: [email or web address]. By joining [employer]’s FollowMyHealth portal, you will also be able to view your health information using other applications (apps) compatible with our system.

## 2021-08-27 NOTE — ED ADULT NURSE NOTE - OBJECTIVE STATEMENT
pt has an indwelling valdez catheter , long term , last placed 6 days ago , noticed hematuria in bag since 1 pm today , denies pain /fever/chills , presently on antibiotics for UTI

## 2021-08-27 NOTE — ED PROVIDER NOTE - NSFOLLOWUPINSTRUCTIONS_ED_ALL_ED_FT
Please follow up with a UROLOGIST. GO TO THE CLINIC OR MAKE AN APPOINTMENT. If you have any problem getting an appointment this week, please call the ED Referral Coordinator at [phone].  Return to the Emergency Department if you have any new or worsening symptoms, or for any other concerns. Please read below for further information.      Hematuria    WHAT YOU NEED TO KNOW:    Hematuria is blood in your urine. Your urine may be bright red to dark brown.    DISCHARGE INSTRUCTIONS:    Return to the emergency department if:   •You have blood in your urine after a new injury, such as a fall.      •You have severe back or side pain that does not go away with treatment.      Call your doctor if:   •You are urinating very small amounts or not at all.      •You feel like you cannot empty your bladder.      •You have a fever that gets worse or does not go away with treatment.      •You cannot keep liquids or medicines down.      •Your urine gets darker, even after you drink extra liquids.      •You have questions or concerns about your condition, treatment, or care.      Drink liquids as directed: You may need to drink extra liquids to help flush the blood from your body through your urine. Water is the best liquid to drink. Ask how much liquid to drink each day and which liquids are best for you.    Follow up with your doctor as directed: Write down your questions so you remember to ask them during your visits.      Jacques Catheter Placement and Care    WHAT YOU NEED TO KNOW:    A Jacques catheter is a sterile tube that is inserted into your bladder to drain urine. It is also called an indwelling urinary catheter.     DISCHARGE INSTRUCTIONS:    Return to the emergency department if:   •Your catheter comes out.       •You suddenly have material that looks like sand in the tubing or drainage bag.      •No urine is draining into the bag and you have checked the system.      •You have pain in your hip, back, pelvis, or lower abdomen.      •You are confused or cannot think clearly.      Call your doctor or urologist if:   •You have a fever.      •You have bladder spasms for more than 1 day after the catheter is placed.      •You see blood in the tubing or drainage bag.      •You have a rash or itching where the catheter tube is secured to your skin.      •Urine leaks from or around the catheter, tubing, or drainage bag.      •The closed drainage system has accidently come open or apart.       •You see a layer of crystals inside the tubing.      •You have questions or concerns about your condition or care.      Care for your catheter and drainage bag: You can reduce your risk for infection and injury by caring for your catheter and drainage bag properly.  •Wash your hands often. Wash before and after you touch your catheter, tubing, or drainage bag. Use soap and water. Wear clean disposable gloves when you care for your catheter or disconnect the drainage bag. Wash your hands before you prepare or eat food.   Handwashing           •Clean your genital area 2 times every day. Clean your catheter area and anal opening after every bowel movement. ?For men: Use a soapy cloth to clean the tip of your penis. Start where the catheter enters. Wipe backward making sure to pull back the foreskin. Then use a cloth with clear water in the same direction to clean away the soap.       ?For women: Use a soapy cloth to clean the area that the catheter enters your body. Make sure to separate your labia and wipe toward the anus. Then use a cloth with clear water and wipe in the same direction.       •Secure the catheter tube so you do not pull or move the catheter. This helps prevent pain and bladder spasms. Healthcare providers will show you how to use medical tape or a strap to secure the catheter tube to your body.       •Keep a closed drainage system. Your catheter should always be attached to the drainage bag to form a closed system. Do not disconnect any part of the closed system unless you need to change the bag.      •Keep the drainage bag below the level of your waist. This helps stop urine from moving back up the tubing and into your bladder. Do not loop or kink the tubing. This can cause urine to back up and collect in your bladder. Do not let the drainage bag touch or lie on the floor.      •Empty the drainage bag when needed. The weight of a full drainage bag can be painful. Empty the drainage bag every 3 to 6 hours or when it is ? full.       •Clean and change the drainage bag as directed. Ask your healthcare provider how often you should change the drainage bag and what cleaning solution to use. Wear disposable gloves when you change the bag. Do not allow the end of the catheter or tubing to touch anything. Clean the ends with an alcohol pad before you reconnect them.      What to do if problems develop:   •No urine is draining into the bag: ?Check for kinks in the tubing and straighten them out.       ?Check the tape or strap used to secure the catheter tube to your skin. Make sure it is not blocking the tube.       ?Make sure you are not sitting or lying on the tubing.      ?Make sure the urine bag is hanging below the level of your waist.      •Urine leaks from or around the catheter, tubing, or drainage bag: Check if the closed drainage system has accidently come open or apart. Clean the catheter and tubing ends with a new alcohol pad and reconnect them.       Follow up with your doctor or urologist as directed: Write down your questions so you remember to ask them during your visits.

## 2021-08-27 NOTE — ED ADULT TRIAGE NOTE - BSA (M2)
Body Location Override (Optional - Billing Will Still Be Based On Selected Body Map Location If Applicable): right pretibia
2.11
Size Of Lesion: 1.7
Name Of The Referring Provider For Procedure: ANNABELLE PRADO
X Size Of Lesion In Cm (Optional): 1.2
Anatomic Location From Referring Provider: right distal pretibial region
Detail Level: Detailed
Incorporate Mauc In Note: Yes

## 2021-08-30 LAB
-  AMPICILLIN/SULBACTAM: SIGNIFICANT CHANGE UP
-  AMPICILLIN: SIGNIFICANT CHANGE UP
-  CEFAZOLIN: SIGNIFICANT CHANGE UP
-  CEFEPIME: SIGNIFICANT CHANGE UP
-  CEFTRIAXONE: SIGNIFICANT CHANGE UP
-  CIPROFLOXACIN: SIGNIFICANT CHANGE UP
-  ERTAPENEM: SIGNIFICANT CHANGE UP
-  GENTAMICIN: SIGNIFICANT CHANGE UP
-  MEROPENEM: SIGNIFICANT CHANGE UP
-  NITROFURANTOIN: SIGNIFICANT CHANGE UP
-  PIPERACILLIN/TAZOBACTAM: SIGNIFICANT CHANGE UP
-  TOBRAMYCIN: SIGNIFICANT CHANGE UP
-  TRIMETHOPRIM/SULFAMETHOXAZOLE: SIGNIFICANT CHANGE UP
CULTURE RESULTS: SIGNIFICANT CHANGE UP
METHOD TYPE: SIGNIFICANT CHANGE UP
ORGANISM # SPEC MICROSCOPIC CNT: SIGNIFICANT CHANGE UP
ORGANISM # SPEC MICROSCOPIC CNT: SIGNIFICANT CHANGE UP
SPECIMEN SOURCE: SIGNIFICANT CHANGE UP

## 2021-09-03 ENCOUNTER — EMERGENCY (EMERGENCY)
Facility: HOSPITAL | Age: 56
LOS: 1 days | Discharge: HOME | End: 2021-09-03
Attending: EMERGENCY MEDICINE | Admitting: EMERGENCY MEDICINE
Payer: SELF-PAY

## 2021-09-03 VITALS
RESPIRATION RATE: 17 BRPM | DIASTOLIC BLOOD PRESSURE: 76 MMHG | SYSTOLIC BLOOD PRESSURE: 136 MMHG | TEMPERATURE: 98 F | HEART RATE: 78 BPM | OXYGEN SATURATION: 98 %

## 2021-09-03 VITALS
WEIGHT: 190.04 LBS | DIASTOLIC BLOOD PRESSURE: 73 MMHG | OXYGEN SATURATION: 98 % | TEMPERATURE: 98 F | SYSTOLIC BLOOD PRESSURE: 175 MMHG | HEIGHT: 73 IN | RESPIRATION RATE: 18 BRPM | HEART RATE: 102 BPM

## 2021-09-03 DIAGNOSIS — N40.1 BENIGN PROSTATIC HYPERPLASIA WITH LOWER URINARY TRACT SYMPTOMS: ICD-10-CM

## 2021-09-03 DIAGNOSIS — R33.8 OTHER RETENTION OF URINE: ICD-10-CM

## 2021-09-03 DIAGNOSIS — R33.9 RETENTION OF URINE, UNSPECIFIED: ICD-10-CM

## 2021-09-03 LAB
APPEARANCE UR: CLEAR — SIGNIFICANT CHANGE UP
BACTERIA # UR AUTO: PRESENT /HPF
BILIRUB UR-MCNC: NEGATIVE — SIGNIFICANT CHANGE UP
COLOR SPEC: YELLOW — SIGNIFICANT CHANGE UP
DIFF PNL FLD: ABNORMAL
EPI CELLS # UR: SIGNIFICANT CHANGE UP /HPF (ref 0–5)
GLUCOSE UR QL: NEGATIVE — SIGNIFICANT CHANGE UP
KETONES UR-MCNC: NEGATIVE — SIGNIFICANT CHANGE UP
LEUKOCYTE ESTERASE UR-ACNC: ABNORMAL
NITRITE UR-MCNC: NEGATIVE — SIGNIFICANT CHANGE UP
PH UR: 7.5 — SIGNIFICANT CHANGE UP (ref 5–8)
PROT UR-MCNC: NEGATIVE MG/DL — SIGNIFICANT CHANGE UP
RBC CASTS # UR COMP ASSIST: > 10 /HPF
SP GR SPEC: 1.01 — SIGNIFICANT CHANGE UP (ref 1–1.03)
UROBILINOGEN FLD QL: 0.2 E.U./DL — SIGNIFICANT CHANGE UP
WBC UR QL: ABNORMAL /HPF

## 2021-09-03 PROCEDURE — 87086 URINE CULTURE/COLONY COUNT: CPT

## 2021-09-03 PROCEDURE — 99283 EMERGENCY DEPT VISIT LOW MDM: CPT | Mod: 25

## 2021-09-03 PROCEDURE — 51702 INSERT TEMP BLADDER CATH: CPT

## 2021-09-03 PROCEDURE — 81001 URINALYSIS AUTO W/SCOPE: CPT

## 2021-09-03 PROCEDURE — 99284 EMERGENCY DEPT VISIT MOD MDM: CPT

## 2021-09-03 PROCEDURE — 87186 SC STD MICRODIL/AGAR DIL: CPT

## 2021-09-03 PROCEDURE — 99053 MED SERV 10PM-8AM 24 HR FAC: CPT

## 2021-09-03 NOTE — ED PROVIDER NOTE - PROGRESS NOTE DETAILS
USHAH: U culture positive for infection, will rx cipro per susceptibilities. pt has indwelling cath so pt aware needs to follow up with urology ASAP to see duration of therapy. pt denies any current fevers/flank pain. ed return precautions given

## 2021-09-03 NOTE — ED PROVIDER NOTE - PATIENT PORTAL LINK FT
You can access the FollowMyHealth Patient Portal offered by [facility] by registering at the following website: [email or web address]. By joining [employer]’s FollowMyHealth portal, you will also be able to view your health information using other applications (apps) compatible with our system.

## 2021-09-03 NOTE — ED PROVIDER NOTE - ATTENDING CONTRIBUTION TO CARE
55M hx BPH c/o urinary retention. pt has chronic indwelling valdez and removed it 4hrs ago, states it was jammed. states now unable to urinate. no fevers, no vomiting.  gen- nad  heent- ncat  abd - soft, suprapubic ttp  ext -wwp  neuro -aox3, steady gait, stevenson  valdez placed, with improvement in symptoms, recommend f/u with

## 2021-09-03 NOTE — ED ADULT NURSE NOTE - OBJECTIVE STATEMENT
received A&Ox3 55years old male pt with c/o of " I have urinary retention." pt uses valdez at home to void due to prostate enlargement. pt does not recall last time he urinated. denies fever, nausea, vomiting. pt reports seeing " white floaters" in his urine and the catheter was not draining properly, thus he took out the catheter. currently, pt reports discomfort in the abdomen

## 2021-09-03 NOTE — ED PROVIDER NOTE - OBJECTIVE STATEMENT
54 yo m with pmh of BPH with chronic indwelling valdez c/o needing a new valdez catheter because he removed his about 4 hours ago. pt states the valdez was jammed and a debris was coming out of it so he took it out. Pt states now he cannot urinate and needs a 14 Yoruba valdez placed. Admits to suprapubic discomfort. Denies fever, chills, n/v, back pain.

## 2021-09-03 NOTE — ED PROVIDER NOTE - PHYSICAL EXAMINATION
CONSTITUTIONAL: Well-appearing;  in no apparent distress.   HEAD: Normocephalic; atraumatic.   NECK: Supple; non-tender;   CARDIOVASCULAR: rrr, no audible murmurs   RESPIRATORY: Breathing easily;   abd: mild suprapubic ttp  : normal testicular exam

## 2021-09-03 NOTE — ED PROVIDER NOTE - CLINICAL SUMMARY MEDICAL DECISION MAKING FREE TEXT BOX
54 yo m with pmh of BPH with chronic indwelling valdez c/o needing a new valdez catheter because he removed his about 4 hours ago. pt states the valdez was jammed and a debris was coming out of it so he took it out. Pt states now he cannot urinate and needs a 14 Kinyarwanda valdez placed. Admits to suprapubic discomfort. Denies fever, chills, n/v, back pain. Abd soft, mild suprapubic ttp, normal testicular exam. Valdez replaced. 54 yo m with pmh of BPH with chronic indwelling valdez c/o needing a new valdez catheter because he removed his about 4 hours ago. pt states the valdez was jammed and a debris was coming out of it so he took it out. Pt states now he cannot urinate and needs a 14 Albanian valdez placed. Admits to suprapubic discomfort. Denies fever, chills, n/v, back pain. Abd soft, mild suprapubic ttp, normal testicular exam. Valdez replaced., drained ~560cc yellow urine.

## 2021-09-03 NOTE — ED ADULT NURSE NOTE - TEMPLATE LIST FOR HEAD TO TOE ASSESSMENT
COVID-19 PCR test completed. Patient handout For Patients Who Have Been Tested for Covid-19 (Coronavirus) was given to the patient, which includes test result notification process.      Symptoms

## 2021-09-09 RX ORDER — MOXIFLOXACIN HYDROCHLORIDE TABLETS, 400 MG 400 MG/1
1 TABLET, FILM COATED ORAL
Qty: 28 | Refills: 0
Start: 2021-09-09 | End: 2021-09-22

## 2021-09-13 ENCOUNTER — EMERGENCY (EMERGENCY)
Facility: HOSPITAL | Age: 56
LOS: 1 days | Discharge: HOME | End: 2021-09-13
Attending: EMERGENCY MEDICINE | Admitting: EMERGENCY MEDICINE
Payer: SELF-PAY

## 2021-09-13 VITALS
RESPIRATION RATE: 16 BRPM | OXYGEN SATURATION: 100 % | TEMPERATURE: 98 F | SYSTOLIC BLOOD PRESSURE: 137 MMHG | WEIGHT: 190.04 LBS | DIASTOLIC BLOOD PRESSURE: 83 MMHG | HEIGHT: 73 IN | HEART RATE: 84 BPM

## 2021-09-13 VITALS
TEMPERATURE: 98 F | DIASTOLIC BLOOD PRESSURE: 80 MMHG | HEART RATE: 79 BPM | SYSTOLIC BLOOD PRESSURE: 138 MMHG | RESPIRATION RATE: 18 BRPM | OXYGEN SATURATION: 99 %

## 2021-09-13 DIAGNOSIS — R33.9 RETENTION OF URINE, UNSPECIFIED: ICD-10-CM

## 2021-09-13 DIAGNOSIS — N40.0 BENIGN PROSTATIC HYPERPLASIA WITHOUT LOWER URINARY TRACT SYMPTOMS: ICD-10-CM

## 2021-09-13 LAB
APPEARANCE UR: ABNORMAL
BACTERIA # UR AUTO: SIGNIFICANT CHANGE UP /HPF
BILIRUB UR-MCNC: NEGATIVE — SIGNIFICANT CHANGE UP
COLOR SPEC: YELLOW — SIGNIFICANT CHANGE UP
DIFF PNL FLD: ABNORMAL
EPI CELLS # UR: SIGNIFICANT CHANGE UP /HPF (ref 0–5)
GLUCOSE UR QL: NEGATIVE — SIGNIFICANT CHANGE UP
KETONES UR-MCNC: NEGATIVE — SIGNIFICANT CHANGE UP
LEUKOCYTE ESTERASE UR-ACNC: ABNORMAL
NITRITE UR-MCNC: NEGATIVE — SIGNIFICANT CHANGE UP
PH UR: 7 — SIGNIFICANT CHANGE UP (ref 5–8)
PROT UR-MCNC: 100 MG/DL
RBC CASTS # UR COMP ASSIST: ABNORMAL /HPF
SP GR SPEC: 1.02 — SIGNIFICANT CHANGE UP (ref 1–1.03)
UROBILINOGEN FLD QL: 0.2 E.U./DL — SIGNIFICANT CHANGE UP
WBC UR QL: ABNORMAL /HPF

## 2021-09-13 PROCEDURE — 51702 INSERT TEMP BLADDER CATH: CPT

## 2021-09-13 PROCEDURE — 99284 EMERGENCY DEPT VISIT MOD MDM: CPT

## 2021-09-13 PROCEDURE — 99283 EMERGENCY DEPT VISIT LOW MDM: CPT

## 2021-09-13 PROCEDURE — 81001 URINALYSIS AUTO W/SCOPE: CPT

## 2021-09-13 PROCEDURE — 87086 URINE CULTURE/COLONY COUNT: CPT

## 2021-09-13 NOTE — ED PROVIDER NOTE - PATIENT PORTAL LINK FT
You can access the FollowMyHealth Patient Portal offered by [facility] by registering at the following website: [email or web address]. By joining [employer]’s FollowMyHealth portal, you will also be able to view your health information using other applications (apps) compatible with our system.

## 2021-09-13 NOTE — ED PROVIDER NOTE - CLINICAL SUMMARY MEDICAL DECISION MAKING FREE TEXT BOX
54 yo m with pmh of BPH with chronic indwelling valdez c/o urinary retention after he removed his catheter about 1 hour ago. Pt states the catheter was leaking/clogged which is why he removed it. After removing the catheter he urinated only blood. Denies fever, chills, abd pain, back pain. PE unremarkable. Foleu placed, 300cc urine came out. Dcd with leg bag.

## 2021-09-13 NOTE — ED PROVIDER NOTE - OBJECTIVE STATEMENT
54 yo m with pmh of BPH with chronic indwelling valdez c/o urinary retention after he removed his catheter about 1 hour ago. Pt states the catheter was leaking/clogged which is why he removed it. After removing the catheter he urinated only blood. Denies fever, chills, abd pain, back pain.

## 2021-09-13 NOTE — ED PROVIDER NOTE - ATTENDING CONTRIBUTION TO CARE
I discussed the plan of care of the patient directly with the PA and examined the patient while in the Emergency Department. I agree with the HPI and PE as documented by the PA.  Pt is a 54yo m, h/o bph, with chronic indwelling valdez since '15, who presents for valdez change. Pt removed valdez himself this morning as it was "overflowing and clogged'. Pt only able to urinate a small amt since with dribbling of urine. No f/c, flank pain, abd pain, n/v. + slight dysuria and blood in valdez. PE as above. Abd exam benign. No cvat. Valdez placed w/ outpt of 200ccs. + blood and wbcs to urine, however likely chronically colonized. As pt has no f/c, will hold on tx w/ abx. Pt advised on f/u with urology for re-evaluation. ED evaluation and management discussed with the patient in detail.  Close urology follow up encouraged.  Strict ED return instructions discussed in detail and patient given the opportunity to ask any questions about their discharge diagnosis and instructions. Patient verbalized understanding.

## 2021-09-13 NOTE — ED PROVIDER NOTE - PHYSICAL EXAMINATION
CONSTITUTIONAL: Well-appearing;  in no apparent distress.   HEAD: Normocephalic; atraumatic.   EYES: PERRL; EOM intact; conjunctiva and sclera clear  ENT: normal nose; no rhinorrhea; normal pharynx with no erythema or lesions.   NECK: Supple; non-tender; no LAD  CARDIOVASCULAR: Normal S1, S2; No audible murmurs. Regular rate and rhythm.   RESPIRATORY: Breathing easily; breath sounds clear and equal bilaterally; no wheezes, rhonchi, or rales.  GI: Soft; non-distended; non-tender; no palpable organomegaly.   : wnl   MSK: FROM at all extremities, normal tone   EXT: No cyanosis or edema; N/V intact  SKIN: Normal for age and race; warm; dry; good turgor; no apparent lesions or rash.   NEURO: A & O x 3; face symmetric; grossly unremarkable.   PSYCHOLOGICAL: The patient’s mood and manner are appropriate.

## 2021-09-14 LAB
CULTURE RESULTS: SIGNIFICANT CHANGE UP
SPECIMEN SOURCE: SIGNIFICANT CHANGE UP

## 2021-10-26 NOTE — ED ADULT NURSE NOTE - CARDIO ASSESSMENT
--- Zygomaticofacial Flap Text: Given the location of the defect, shape of the defect and the proximity to free margins a zygomaticofacial flap was deemed most appropriate for repair.  Using a sterile surgical marker, the appropriate flap was drawn incorporating the defect and placing the expected incisions within the relaxed skin tension lines where possible. The area thus outlined was incised deep to adipose tissue with a #15 scalpel blade with preservation of a vascular pedicle.  The skin margins were undermined to an appropriate distance in all directions utilizing iris scissors.  The flap was then placed into the defect and anchored with interrupted buried subcutaneous sutures.

## 2021-12-05 NOTE — ED ADULT TRIAGE NOTE - LOCATION:
Left arm; MEDICATIONS  (STANDING):  aspirin  chewable 81 milliGRAM(s) Oral daily  atorvastatin 80 milliGRAM(s) Oral at bedtime  bisacodyl 5 milliGRAM(s) Oral at bedtime  chlorhexidine 2% Cloths 1 Application(s) Topical <User Schedule>  colchicine 0.6 milliGRAM(s) Oral two times a day  epoetin ximena-epbx (RETACRIT) Injectable 6000 Unit(s) IV Push <User Schedule>  famotidine    Tablet 20 milliGRAM(s) Oral daily  ferrous    sulfate 325 milliGRAM(s) Oral daily  folic acid 1 milliGRAM(s) Oral daily  heparin   Injectable 5000 Unit(s) SubCutaneous every 12 hours  hydrALAZINE 50 milliGRAM(s) Oral three times a day  isosorbide   mononitrate ER Tablet (IMDUR) 30 milliGRAM(s) Oral daily  lactulose Syrup 20 Gram(s) Oral at bedtime  sevelamer carbonate 1600 milliGRAM(s) Oral three times a day

## 2022-01-01 NOTE — ED PROVIDER NOTE - CROS ED ROS STATEMENT
all other ROS negative except as per HPI
Site: Sternum (16 Jul 2022 03:40)  Bilirubin: 4.6 (16 Jul 2022 03:40)  Site: Sternum (15 Jul 2022 15:50)  Bilirubin: 4.4 (15 Jul 2022 15:50)  Site: Sternum (15 Jul 2022 08:09)  Bilirubin: 3.7 (15 Jul 2022 08:09)

## 2022-01-02 ENCOUNTER — EMERGENCY (EMERGENCY)
Facility: HOSPITAL | Age: 57
LOS: 1 days | Discharge: HOME | End: 2022-01-02
Attending: STUDENT IN AN ORGANIZED HEALTH CARE EDUCATION/TRAINING PROGRAM | Admitting: STUDENT IN AN ORGANIZED HEALTH CARE EDUCATION/TRAINING PROGRAM
Payer: SELF-PAY

## 2022-01-02 VITALS
SYSTOLIC BLOOD PRESSURE: 142 MMHG | OXYGEN SATURATION: 98 % | HEART RATE: 99 BPM | RESPIRATION RATE: 18 BRPM | HEIGHT: 73 IN | TEMPERATURE: 99 F | DIASTOLIC BLOOD PRESSURE: 90 MMHG | WEIGHT: 190.04 LBS

## 2022-01-02 VITALS
DIASTOLIC BLOOD PRESSURE: 93 MMHG | OXYGEN SATURATION: 99 % | RESPIRATION RATE: 17 BRPM | SYSTOLIC BLOOD PRESSURE: 150 MMHG | HEART RATE: 95 BPM | TEMPERATURE: 98 F

## 2022-01-02 DIAGNOSIS — N40.1 BENIGN PROSTATIC HYPERPLASIA WITH LOWER URINARY TRACT SYMPTOMS: ICD-10-CM

## 2022-01-02 DIAGNOSIS — R33.9 RETENTION OF URINE, UNSPECIFIED: ICD-10-CM

## 2022-01-02 LAB
APPEARANCE UR: CLEAR — SIGNIFICANT CHANGE UP
BACTERIA # UR AUTO: PRESENT /HPF
BILIRUB UR-MCNC: NEGATIVE — SIGNIFICANT CHANGE UP
COLOR SPEC: YELLOW — SIGNIFICANT CHANGE UP
COMMENT - URINE: SIGNIFICANT CHANGE UP
DIFF PNL FLD: ABNORMAL
EPI CELLS # UR: SIGNIFICANT CHANGE UP /HPF (ref 0–5)
GLUCOSE UR QL: NEGATIVE — SIGNIFICANT CHANGE UP
KETONES UR-MCNC: NEGATIVE — SIGNIFICANT CHANGE UP
LEUKOCYTE ESTERASE UR-ACNC: NEGATIVE — SIGNIFICANT CHANGE UP
NITRITE UR-MCNC: NEGATIVE — SIGNIFICANT CHANGE UP
PH UR: 6 — SIGNIFICANT CHANGE UP (ref 5–8)
PROT UR-MCNC: NEGATIVE MG/DL — SIGNIFICANT CHANGE UP
RBC CASTS # UR COMP ASSIST: > 10 /HPF
SP GR SPEC: 1.02 — SIGNIFICANT CHANGE UP (ref 1–1.03)
UROBILINOGEN FLD QL: 0.2 E.U./DL — SIGNIFICANT CHANGE UP
WBC UR QL: < 5 /HPF — SIGNIFICANT CHANGE UP

## 2022-01-02 PROCEDURE — 81001 URINALYSIS AUTO W/SCOPE: CPT

## 2022-01-02 PROCEDURE — 87086 URINE CULTURE/COLONY COUNT: CPT

## 2022-01-02 PROCEDURE — 51702 INSERT TEMP BLADDER CATH: CPT

## 2022-01-02 PROCEDURE — 99284 EMERGENCY DEPT VISIT MOD MDM: CPT

## 2022-01-02 PROCEDURE — 99283 EMERGENCY DEPT VISIT LOW MDM: CPT | Mod: 25

## 2022-01-02 PROCEDURE — 87186 SC STD MICRODIL/AGAR DIL: CPT

## 2022-01-02 RX ORDER — CIPROFLOXACIN LACTATE 400MG/40ML
1 VIAL (ML) INTRAVENOUS
Qty: 20 | Refills: 0
Start: 2022-01-02 | End: 2022-01-11

## 2022-01-02 NOTE — ED PROVIDER NOTE - CARE PLAN
Principal Discharge DX:	Hypertrophy of prostate with urinary obstruction  Secondary Diagnosis:	Suspected UTI   1

## 2022-01-02 NOTE — ED ADULT NURSE NOTE - NSIMPLEMENTINTERV_GEN_ALL_ED
Implemented All Universal Safety Interventions:  [place] to call system. Call bell, personal items and telephone within reach. Instruct patient to call for assistance. Room bathroom lighting operational. Non-slip footwear when patient is off stretcher. Physically safe environment: no spills, clutter or unnecessary equipment. Stretcher in lowest position, wheels locked, appropriate side rails in place.

## 2022-01-02 NOTE — ED PROVIDER NOTE - PATIENT PORTAL LINK FT
You can access the FollowMyHealth Patient Portal offered by [facility] by registering at the following website: [email or web address]. By joining [employer]’s FollowMyHealth portal, you will also be able to view your health information using other applications (apps) compatible with our system.

## 2022-01-02 NOTE — ED PROVIDER NOTE - CARE PROVIDER_API CALL
Aden Villarreal)  Urology  [address], [facility], [place] [address]  Phone: [phone]  Fax: [phone]  Follow Up Time:

## 2022-01-02 NOTE — ED PROVIDER NOTE - CROS ED ROS STATEMENT
all other ROS negative except as per HPI
CAD (coronary artery disease) of artery bypass graft    Carcinoma in situ  of larynx  Claudication of both lower extremities    CVA (cerebral vascular accident)  secondary to heart surgery  Essential hypertension    H/O heart surgery    High cholesterol    Hyperlipidemia    Hypertension    PAD (peripheral artery disease)    Vocal cord disease

## 2022-01-02 NOTE — ED PROVIDER NOTE - CLINICAL SUMMARY MEDICAL DECISION MAKING FREE TEXT BOX
new valdez placed with good urinary flow. UA/UC sent.   Abx refilled to pt's pharmacy. Pt to f/u with his urologist (iain).     NANCY.

## 2022-01-02 NOTE — ED ADULT NURSE NOTE - OBJECTIVE STATEMENT
55yo Male noted he had an indwelling urinary catheter for Prostate concerns, was working out this Am and the catheter for clotted and he was shown how to remove the catheter. PT noted he feels like he has to urinate and has been unable to urinate at this time.

## 2022-01-02 NOTE — ED PROVIDER NOTE - OBJECTIVE STATEMENT
56M pmhx BPH with chronic indwelling valdez c/b frequent UTIs, went to gym today with catheter in, not long after felt that it wasn't draining so decided to remove it himself. Presents to the ED hours later with difficulty urinating despite feeling like he needs to go. Pt also states that his urine has been foul smelling and cloudy; typical symptoms for his utis.    Denies fever, cough, cp, ab pain, nausea, vomiting, dizziness.

## 2022-01-04 LAB
-  AMPICILLIN/SULBACTAM: SIGNIFICANT CHANGE UP
-  AMPICILLIN: SIGNIFICANT CHANGE UP
-  CEFAZOLIN: SIGNIFICANT CHANGE UP
-  CEFTRIAXONE: SIGNIFICANT CHANGE UP
-  CIPROFLOXACIN: SIGNIFICANT CHANGE UP
-  ERTAPENEM: SIGNIFICANT CHANGE UP
-  GENTAMICIN: SIGNIFICANT CHANGE UP
-  NITROFURANTOIN: SIGNIFICANT CHANGE UP
-  PIPERACILLIN/TAZOBACTAM: SIGNIFICANT CHANGE UP
-  TOBRAMYCIN: SIGNIFICANT CHANGE UP
-  TRIMETHOPRIM/SULFAMETHOXAZOLE: SIGNIFICANT CHANGE UP
CULTURE RESULTS: SIGNIFICANT CHANGE UP
METHOD TYPE: SIGNIFICANT CHANGE UP
ORGANISM # SPEC MICROSCOPIC CNT: SIGNIFICANT CHANGE UP
ORGANISM # SPEC MICROSCOPIC CNT: SIGNIFICANT CHANGE UP
SPECIMEN SOURCE: SIGNIFICANT CHANGE UP

## 2022-01-22 ENCOUNTER — EMERGENCY (EMERGENCY)
Facility: HOSPITAL | Age: 57
LOS: 1 days | Discharge: HOME | End: 2022-01-22
Attending: EMERGENCY MEDICINE | Admitting: EMERGENCY MEDICINE
Payer: SELF-PAY

## 2022-01-22 VITALS
SYSTOLIC BLOOD PRESSURE: 146 MMHG | TEMPERATURE: 98 F | DIASTOLIC BLOOD PRESSURE: 85 MMHG | OXYGEN SATURATION: 96 % | RESPIRATION RATE: 18 BRPM | HEIGHT: 73 IN | HEART RATE: 111 BPM | WEIGHT: 190.04 LBS

## 2022-01-22 VITALS
HEART RATE: 106 BPM | OXYGEN SATURATION: 98 % | TEMPERATURE: 98 F | DIASTOLIC BLOOD PRESSURE: 91 MMHG | SYSTOLIC BLOOD PRESSURE: 150 MMHG | RESPIRATION RATE: 16 BRPM

## 2022-01-22 VITALS
WEIGHT: 190.04 LBS | TEMPERATURE: 99 F | OXYGEN SATURATION: 100 % | HEART RATE: 106 BPM | RESPIRATION RATE: 19 BRPM | HEIGHT: 73 IN | SYSTOLIC BLOOD PRESSURE: 126 MMHG | DIASTOLIC BLOOD PRESSURE: 89 MMHG

## 2022-01-22 VITALS
SYSTOLIC BLOOD PRESSURE: 149 MMHG | HEART RATE: 94 BPM | RESPIRATION RATE: 16 BRPM | OXYGEN SATURATION: 97 % | DIASTOLIC BLOOD PRESSURE: 66 MMHG | TEMPERATURE: 98 F

## 2022-01-22 DIAGNOSIS — T83.098A OTHER MECHANICAL COMPLICATION OF OTHER URINARY CATHETER, INITIAL ENCOUNTER: ICD-10-CM
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
## 2022-01-22 DIAGNOSIS — R33.9 RETENTION OF URINE, UNSPECIFIED: ICD-10-CM
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
## 2022-01-22 DIAGNOSIS — T83.091A OTHER MECHANICAL COMPLICATION OF INDWELLING URETHRAL CATHETER, INITIAL ENCOUNTER: ICD-10-CM

## 2022-01-22 DIAGNOSIS — R31.9 HEMATURIA, UNSPECIFIED: ICD-10-CM

## 2022-01-22 LAB
APPEARANCE UR: ABNORMAL
BACTERIA # UR AUTO: SIGNIFICANT CHANGE UP /HPF
BILIRUB UR-MCNC: NEGATIVE — SIGNIFICANT CHANGE UP
COLOR SPEC: YELLOW — SIGNIFICANT CHANGE UP
COMMENT - URINE: SIGNIFICANT CHANGE UP
DIFF PNL FLD: ABNORMAL
EPI CELLS # UR: SIGNIFICANT CHANGE UP /HPF (ref 0–5)
GLUCOSE UR QL: NEGATIVE — SIGNIFICANT CHANGE UP
KETONES UR-MCNC: NEGATIVE — SIGNIFICANT CHANGE UP
LEUKOCYTE ESTERASE UR-ACNC: ABNORMAL
NITRITE UR-MCNC: POSITIVE
PH UR: 6 — SIGNIFICANT CHANGE UP (ref 5–8)
PROT UR-MCNC: 100 MG/DL
RBC CASTS # UR COMP ASSIST: ABNORMAL /HPF
SP GR SPEC: 1.02 — SIGNIFICANT CHANGE UP (ref 1–1.03)
UROBILINOGEN FLD QL: 0.2 E.U./DL — SIGNIFICANT CHANGE UP
WBC UR QL: > 10 /HPF

## 2022-01-22 PROCEDURE — 99053 MED SERV 10PM-8AM 24 HR FAC: CPT

## 2022-01-22 PROCEDURE — 81001 URINALYSIS AUTO W/SCOPE: CPT

## 2022-01-22 PROCEDURE — 99284 EMERGENCY DEPT VISIT MOD MDM: CPT

## 2022-01-22 PROCEDURE — 99283 EMERGENCY DEPT VISIT LOW MDM: CPT

## 2022-01-22 PROCEDURE — 87086 URINE CULTURE/COLONY COUNT: CPT

## 2022-01-22 PROCEDURE — 87186 SC STD MICRODIL/AGAR DIL: CPT

## 2022-01-22 RX ORDER — NITROFURANTOIN MACROCRYSTAL 50 MG
1 CAPSULE ORAL
Qty: 14 | Refills: 0
Start: 2022-01-22 | End: 2022-01-28

## 2022-01-22 NOTE — ED PROVIDER NOTE - OBJECTIVE STATEMENT
57 y/o male with a PMHx of BPH with chronic valdez is here in the ED c/o catheter leakage. Pt reports leaking at the top of the bag. Current leg bag has been in place for 3 weeks and reports having intermittent small amount of hematuria. Pt states he was diagnosed with a uti x2 weeks ago and was given abx, however did not  his medication as he was trying to treat his uti with herbal medicine. He denies the following: fever, chills, flank/abdominal pain, n/v.

## 2022-01-22 NOTE — ED PROVIDER NOTE - GENITOURINARY, MLM
No discharge, lesions. No testicular or penile swelling, normal flow into valdez without obstruction

## 2022-01-22 NOTE — ED PROVIDER NOTE - OBJECTIVE STATEMENT
55 y/o M pt with PMhx of BPH with chronic valdez in place, and Valdez placed earlier today presents to ED c/o blood around catheter site and no urinary drainage. Upon evaluation now, pt states it is draining again. He denies pelvic pain, abdominal pain, flank pain, fever, chills, or any other acute complaints. 55 y/o M pt with PMhx of BPH with chronic valdez in place, and Valdez placed earlier today presents to ED c/o blood around catheter site and no urinary drainage. Upon evaluation now, pt states it is draining again. He denies pelvic pain, abdominal pain, flank pain, fever, chills, or any other acute complaints. Dc'd on abx on prior visit.

## 2022-01-22 NOTE — ED PROVIDER NOTE - CLINICAL SUMMARY MEDICAL DECISION MAKING FREE TEXT BOX
here complaining of catheter clogged but now draining, no back pain/flank pain, fever. some mild blood around meatus but suspect from mild trauma for valdez insertion. here complaining of catheter clogged but now draining, no back pain/flank pain, fever. some mild blood around meatus but suspect from mild trauma from prior valdez insertion. pt to continue already prescribed and pmd/urology fu.

## 2022-01-22 NOTE — ED PROVIDER NOTE - CLINICAL SUMMARY MEDICAL DECISION MAKING FREE TEXT BOX
55 y/o male with a PMHx of BPH with chronic indwelling catheter is here in the ED c/o catheter complications. Pt admits to experiencing mild hematuria over the past 2 weeks. No constitutional symptoms. Pt is otherwise non-toxic and well-appearing in NAD. Due to urine cx being "I" for cipro on Jan 2, advised to hold and sent macrobid due to sensitivityh and given pt with new symptoms, consider acute uti vs colonized. Leg bag changed by RN and given return precautions and advised Urology follow-up. I have discussed the discharge plan with the patient. The patient agrees with the plan, as discussed.  The patient understands Emergency Department diagnosis is a preliminary diagnosis often based on limited information and that the patient must adhere to the follow-up plan as discussed.  The patient understands that if the symptoms worsen or if prescribed medications do not have the desired/planned effect that the patient may return to the Emergency Department at any time for further evaluation and treatment.

## 2022-01-22 NOTE — ED ADULT TRIAGE NOTE - OTHER COMPLAINTS
the attachment from the cathter to urine bag is defective as per patient for it doesn't stay as it should be

## 2022-01-22 NOTE — ED PROVIDER NOTE - CARE PROVIDER_API CALL
Aden Villarreal)  Urology  [address], [facility], [place] [address]  Phone: [phone]  Fax: [phone]  Follow Up Time:

## 2022-01-22 NOTE — ED PROVIDER NOTE - NSFOLLOWUPCLINICS_GEN_ALL_ED_FT
[facility]  Family Medicine  [address]  [place], [place] [address]  Phone: [phone]  Fax:

## 2022-01-22 NOTE — ED PROVIDER NOTE - NSFOLLOWUPINSTRUCTIONS_ED_ALL_ED_FT
Return to the Emergency Department if you have any new or worsening symptoms, or if you have any concerns. Otherwise, please follow up with Urology.         Indwelling Urinary Catheter Care, Adult      An indwelling urinary catheter is a thin, flexible, germ-free (sterile) tube that is placed into the bladder to help drain urine out of the body. The catheter is inserted into the part of the body that drains urine from the bladder (urethra). Urine drains from the catheter into a drainage bag outside of the body.    Taking good care of your catheter will keep it working properly and help to prevent problems from developing.      What are the risks?    •Bacteria may get into your bladder and cause a urinary tract infection.      •Urine flow can become blocked. This can happen if the catheter is not working correctly, or if you have sediment or a blood clot in your bladder or the catheter.      •Tissue near the catheter may become irritated and bleed.        How to wear your catheter and your drainage bag    Supplies needed     •Adhesive tape or a leg strap.      •Alcohol wipe or soap and water (if you use tape).      •A clean towel (if you use tape).      •Overnight drainage bag.      •Smaller drainage bag (leg bag).      Wearing your catheter and bag     Use adhesive tape or a leg strap to attach your catheter to your leg.  •Make sure the catheter is not pulled tight.      •If a leg strap gets wet, replace it with a dry one.    •If you use adhesive tape:  1.Use an alcohol wipe or soap and water to wash off any stickiness on your skin where you had tape before.      2.Use a clean towel to pat-dry the area.      3.Apply the new tape.        You should have received a large overnight drainage bag and a smaller leg bag that fits underneath clothing.   •You may wear the overnight bag at any time, but you should not wear the leg bag at night.      •Always wear the leg bag below your knee.      •Make sure the overnight drainage bag is always lower than the level of your bladder, but do not let it touch the floor. Before you go to sleep, hang the bag inside a wastebasket that is covered by a clean plastic bag.        How to care for your skin around the catheter                  Supplies needed     •A clean washcloth.      •Water and mild soap.      •A clean towel.      Caring for your skin and catheter   •Every day, use a clean washcloth and soapy water to clean the skin around your catheter.  1.Wash your hands with soap and water.      2.Wet a washcloth in warm water and mild soap.    3.Clean the skin around your urethra.•If you are female:  •Use one hand to gently spread the folds of skin around your vagina (labia).      •With the washcloth in your other hand, wipe the inner side of your labia on each side. Do this in a front-to-back direction.      •If you are male:  •Use one hand to pull back any skin that covers the end of your penis (foreskin).      •With the washcloth in your other hand, wipe your penis in small circles. Start wiping at the tip of your penis, then move outward from the catheter.      •Move the foreskin back in place, if this applies.          4.With your free hand, hold the catheter close to where it enters your body. Keep holding the catheter during cleaning so it does not get pulled out.    5.Use your other hand to clean the catheter with the washcloth.  •Only wipe downward on the catheter.      •Do not wipe upward toward your body, because that may push bacteria into your urethra and cause infection.        6.Use a clean towel to pat-dry the catheter and the skin around it. Make sure to wipe off all soap.      7.Wash your hands with soap and water.        •Shower every day. Do not take baths.      • Do not use cream, ointment, or lotion on the area where the catheter enters your body, unless your health care provider tells you to do that.      • Do not use powders, sprays, or lotions on your genital area.    •Check your skin around the catheter every day for signs of infection. Check for:  •Redness, swelling, or pain.      •Fluid or blood.      •Warmth.      •Pus or a bad smell.          How to empty the drainage bag    Supplies needed     •Rubbing alcohol.      •Gauze pad or cotton ball.      •Adhesive tape or a leg strap.      Emptying the bag     Empty your drainage bag (your overnight drainage bag or your leg bag) when it is ?–½ full, or at least 2–3 times a day. Clean the drainage bag according to the 's instructions or as told by your health care provider.  1.Wash your hands with soap and water.      2.Detach the drainage bag from your leg.      3.Hold the drainage bag over the toilet or a clean container. Make sure the drainage bag is lower than your hips and bladder. This stops urine from going back into the tubing and into your bladder.      4.Open the pour spout at the bottom of the bag.      5.Empty the urine into the toilet or container. Do not let the pour spout touch any surface. This precaution is important to prevent bacteria from getting in the bag and causing infection.      6.Apply rubbing alcohol to a gauze pad or cotton ball.      7.Use the gauze pad or cotton ball to clean the pour spout.      8.Close the pour spout.      9.Attach the bag to your leg with adhesive tape or a leg strap.      10.Wash your hands with soap and water.        How to change the drainage bag    Supplies needed:     •Alcohol wipes.      •A clean drainage bag.      •Adhesive tape or a leg strap.      Changing the bag     Replace your drainage bag with a clean bag if it leaks, starts to smell bad, or looks dirty.  1.Wash your hands with soap and water.      2.Detach the dirty drainage bag from your leg.      3.Pinch the catheter with your fingers so that urine does not spill out.      4.Disconnect the catheter tube from the drainage tube at the connection valve. Do not let the tubes touch any surface.      5.Clean the end of the catheter tube with an alcohol wipe. Use a different alcohol wipe to clean the end of the drainage tube.      6.Connect the catheter tube to the drainage tube of the clean bag.      7.Attach the clean bag to your leg with adhesive tape or a leg strap. Avoid attaching the new bag too tightly.      8.Wash your hands with soap and water.        General instructions     • Never pull on your catheter or try to remove it. Pulling can damage your internal tissues.      •Always wash your hands before and after you handle your catheter or drainage bag. Use a mild, fragrance-free soap. If soap and water are not available, use hand .      •Always make sure there are no twists or bends (kinks) in the catheter tube.      •Always make sure there are no leaks in the catheter or drainage bag.      •Drink enough fluid to keep your urine pale yellow.      • Do not take baths, swim, or use a hot tub.      •If you are female, wipe from front to back after having a bowel movement.        Contact a health care provider if:    •Your urine is cloudy.      •Your urine smells unusually bad.      •Your catheter gets clogged.      •Your catheter starts to leak.      •Your bladder feels full.        Get help right away if:    •You have redness, swelling, or pain where the catheter enters your body.      •You have fluid, blood, pus, or a bad smell coming from the area where the catheter enters your body.      •The area where the catheter enters your body feels warm to the touch.      •You have a fever.      •You have pain in your abdomen, legs, lower back, or bladder.      •You see blood in the catheter.      •Your urine is pink or red.      •You have nausea, vomiting, or chills.      •Your urine is not draining into the bag.      •Your catheter gets pulled out.        Summary    •An indwelling urinary catheter is a thin, flexible, germ-free (sterile) tube that is placed into the bladder to help drain urine out of the body.      •The catheter is inserted into the part of the body that drains urine from the bladder (urethra).      •Take good care of your catheter to keep it working properly and help prevent problems from developing.      •Always wash your hands before and after you handle your catheter or drainage bag.      • Never pull on your catheter or try to remove it.      This information is not intended to replace advice given to you by your health care provider. Make sure you discuss any questions you have with your health care provider.      Document Revised: 02/22/2021 Document Reviewed: 08/03/2018    Elsevier Patient Education © 2021 [employer]

## 2022-01-22 NOTE — ED ADULT NURSE NOTE - NSICDXPASTMEDICALHX_GEN_ALL_CORE_FT
PAST MEDICAL HISTORY:  Benign prostatic hyperplasia, presence of lower urinary tract symptoms unspecified, unspecified morphology

## 2022-01-22 NOTE — ED PROVIDER NOTE - PATIENT PORTAL LINK FT
You can access the FollowMyHealth Patient Portal offered by [facility] by registering at the following website: [email or web address]. By joining [employer]’s FollowMyHealth portal, you will also be able to view your health information using other applications (apps) compatible with our system.

## 2022-01-22 NOTE — ED ADULT NURSE NOTE - OBJECTIVE STATEMENT
Pt presented to the ED with complaints of valdez catheter problems. As per pt, he noticed that the leg bag connecting to the valdez catheter was leaking and requesting for valdez catheter to be changed. Pt is alert and oriented, ambulatory, denies chest pain, SOB, palpitations, fever, nausea, vomiting, or diarrhea.

## 2022-01-22 NOTE — ED PROVIDER NOTE - PATIENT PORTAL LINK FT
You can access the FollowMyHealth Patient Portal offered by [facility] by registering at the following website: [email or web address]. By joining [employer]’s FollowMyHealth portal, you will also be able to view your health information using other applications (apps) compatible with our system.

## 2022-01-22 NOTE — ED ADULT NURSE REASSESSMENT NOTE - NS ED NURSE REASSESS COMMENT FT1
Urinary catheter assessed. Approximately 50cc of yellow urine in leg drainage bag. States urine has been leaking around valdez site and blood noted in urine. States he was diagnosed with UTI but has not picked up prescription. No distress noted. valdez was changed out this AM. Will wait on MD orders.

## 2022-01-22 NOTE — ED ADULT TRIAGE NOTE - CHIEF COMPLAINT QUOTE
Jacques catheter placed today. Pt reports no urinary drainage and blood in the cathter  since 2 hours ago. Pt reports burning. Denies abd pain or N/V.

## 2022-01-22 NOTE — ED ADULT NURSE NOTE - OBJECTIVE STATEMENT
57 y/o male c/o hematuria leaking around valdez catheter that was placed today, pt stated, "I am pissing blood and it is not in the leg bag, it is coming around." pt denies abdominal pain, n/v.

## 2022-01-24 LAB
-  AMPICILLIN/SULBACTAM: SIGNIFICANT CHANGE UP
-  AMPICILLIN/SULBACTAM: SIGNIFICANT CHANGE UP
-  AMPICILLIN: SIGNIFICANT CHANGE UP
-  AMPICILLIN: SIGNIFICANT CHANGE UP
-  CEFAZOLIN: SIGNIFICANT CHANGE UP
-  CEFAZOLIN: SIGNIFICANT CHANGE UP
-  CEFEPIME: SIGNIFICANT CHANGE UP
-  CEFTRIAXONE: SIGNIFICANT CHANGE UP
-  CEFTRIAXONE: SIGNIFICANT CHANGE UP
-  CIPROFLOXACIN: SIGNIFICANT CHANGE UP
-  CIPROFLOXACIN: SIGNIFICANT CHANGE UP
-  ERTAPENEM: SIGNIFICANT CHANGE UP
-  ERTAPENEM: SIGNIFICANT CHANGE UP
-  GENTAMICIN: SIGNIFICANT CHANGE UP
-  GENTAMICIN: SIGNIFICANT CHANGE UP
-  MEROPENEM: SIGNIFICANT CHANGE UP
-  NITROFURANTOIN: SIGNIFICANT CHANGE UP
-  NITROFURANTOIN: SIGNIFICANT CHANGE UP
-  PIPERACILLIN/TAZOBACTAM: SIGNIFICANT CHANGE UP
-  PIPERACILLIN/TAZOBACTAM: SIGNIFICANT CHANGE UP
-  TOBRAMYCIN: SIGNIFICANT CHANGE UP
-  TOBRAMYCIN: SIGNIFICANT CHANGE UP
-  TRIMETHOPRIM/SULFAMETHOXAZOLE: SIGNIFICANT CHANGE UP
-  TRIMETHOPRIM/SULFAMETHOXAZOLE: SIGNIFICANT CHANGE UP
CULTURE RESULTS: SIGNIFICANT CHANGE UP
METHOD TYPE: SIGNIFICANT CHANGE UP
METHOD TYPE: SIGNIFICANT CHANGE UP
ORGANISM # SPEC MICROSCOPIC CNT: SIGNIFICANT CHANGE UP
SPECIMEN SOURCE: SIGNIFICANT CHANGE UP

## 2022-01-31 NOTE — ED POST DISCHARGE NOTE - DETAILS
pt has indwelling valdez which is likely colonized; currently on macrobid; per chart review, pt not seen in ED for urinary sx, just catheter dysfunction; attempted to call pt to check on symptoms and discuss follow up plan

## 2022-02-13 NOTE — ED ADULT NURSE NOTE - NSFALLRSKUNASSIST_ED_ALL_ED
Attending Only no I have personally provided the amount of critical care time documented below excluding time spent on separate procedures.

## 2022-03-09 NOTE — ED PROVIDER NOTE - DATE/TIME 1
02-Mar-2018 23:11 Alar Island Pedicle Flap Text: The defect edges were debeveled with a #15 scalpel blade.  Given the location of the defect, shape of the defect and the proximity to the alar rim an island pedicle advancement flap was deemed most appropriate.  Using a sterile surgical marker, an appropriate advancement flap was drawn incorporating the defect, outlining the appropriate donor tissue and placing the expected incisions within the nasal ala running parallel to the alar rim. The area thus outlined was incised with a #15 scalpel blade.  The skin margins were undermined minimally to an appropriate distance in all directions around the primary defect and laterally outward around the island pedicle utilizing iris scissors.  There was minimal undermining beneath the pedicle flap.

## 2022-05-09 NOTE — ED PROVIDER NOTE - OBJECTIVE STATEMENT
55M with a hp BPh and chronic indwelling valdez who p/w hematuria in valdez bag. Pt not sure if his catheter was tugged or yanked. He denies pain, no f/c, no flank pain. of note, pt with multiple ED visits for Valdez related complaints. He admits he has not followed up with urology clinic as instructed but states he will do so this week or next. Yes

## 2022-05-26 NOTE — ED ADULT TRIAGE NOTE - HEIGHT IN INCHES
Subjective:      Patient ID: Penny Griffin is a 44 y.o. female. HPI  Patient comes in today for follow-up on symptoms of constipation and IBS. We gave her samples of Trulance last week which seemed to work very well as she has tried Linzess in the past but that caused too much diarrhea. She states that the abdominal bloating and constipation are much better taking the Trulance. No side effects noted with the medication. No other new concerns expressed today. Past Medical History:   Diagnosis Date    Depression     GERD (gastroesophageal reflux disease)     Psoriasis     Psoriatic arthritis (HCC)        Allergies   Allergen Reactions    Codeine Anaphylaxis    Penicillins Anaphylaxis       Current Outpatient Medications   Medication Sig Dispense Refill    Multiple Vitamin (MULTIVITAMIN ADULT PO) Take by mouth      Probiotic Product (PROBIOTIC PO) Take by mouth      vitamin C (ASCORBIC ACID) 500 MG tablet Take 500 mg by mouth daily      B Complex-C (SUPER B COMPLEX PO) Take by mouth      Potassium 99 MG TABS Take by mouth      Calcium Carbonate-Vitamin D (CALCIUM PLUS VITAMIN D PO) Take by mouth      Plecanatide (TRULANCE) 3 MG TABS Take 3 mg by mouth daily 30 tablet 5    amphetamine-dextroamphetamine (ADDERALL XR) 30 MG extended release capsule Take 30 mg by mouth.  buPROPion (WELLBUTRIN XL) 150 MG extended release tablet Take 150 mg by mouth      cetirizine (ZYRTEC) 10 MG tablet Take 10 mg by mouth daily      EPINEPHrine (EPIPEN) 0.3 MG/0.3ML SOAJ injection Inject 0.3 mg into the muscle as needed      lamoTRIgine (LAMICTAL) 150 MG tablet Take 150 mg by mouth 2 times daily      LORazepam (ATIVAN) 1 MG tablet Take 1 mg by mouth.  pantoprazole (PROTONIX) 40 MG tablet Take 40 mg by mouth daily      sertraline (ZOLOFT) 100 MG tablet Take 100 mg by mouth daily      triamcinolone (KENALOG) 0.1 % cream Use thin layer twice daily as needed to the affected area.        No current facility-administered medications for this visit. Social History     Tobacco Use    Smoking status: Former Smoker     Packs/day: 0.50     Quit date: 2018     Years since quittin.3    Smokeless tobacco: Never Used   Substance Use Topics    Alcohol use: No    Drug use: No     Review of Systems   Gastrointestinal: Positive for abdominal pain and constipation. Blood pressure (!) 149/98, pulse 84, temperature 98.1 °F (36.7 °C), temperature source Temporal, resp. rate 16, height 5' 2.5\" (1.588 m), weight 200 lb 3.2 oz (90.8 kg), SpO2 97 %. Objective:   Physical Exam  Vitals reviewed. Constitutional:       General: She is not in acute distress. Appearance: Normal appearance. Cardiovascular:      Rate and Rhythm: Normal rate and regular rhythm. Pulses: Normal pulses. Pulmonary:      Effort: Pulmonary effort is normal.      Breath sounds: Normal breath sounds. Neurological:      General: No focal deficit present. Mental Status: She is alert and oriented to person, place, and time. Assessment / Plan:         Jacy Lai was seen today for gastroesophageal reflux, depression and adhd. Diagnoses and all orders for this visit:    Irritable bowel syndrome with constipation  -     Plecanatide (TRULANCE) 3 MG TABS; Take 3 mg by mouth daily       Continue on Trulance now since she has failed Linzess and taking other over-the-counter medication in the past.  Follow-up and Dispositions    · Return in about 6 months (around 2022), or if symptoms worsen or fail to improve. Dictated using voice recognition software.  Proofread, but unrecognized errors may exist. 1

## 2022-06-03 NOTE — ED PROVIDER NOTE - EYES NEGATIVE STATEMENT, MLM
Class II - visualization of the soft palate, fauces, and uvula no discharge, no irritation, no pain, no redness, and no visual changes.

## 2022-06-08 ENCOUNTER — EMERGENCY (EMERGENCY)
Facility: HOSPITAL | Age: 57
LOS: 1 days | Discharge: HOME | End: 2022-06-08
Attending: EMERGENCY MEDICINE | Admitting: EMERGENCY MEDICINE
Payer: MEDICAID

## 2022-06-08 VITALS
TEMPERATURE: 98 F | DIASTOLIC BLOOD PRESSURE: 93 MMHG | SYSTOLIC BLOOD PRESSURE: 143 MMHG | WEIGHT: 190.04 LBS | HEIGHT: 73 IN | OXYGEN SATURATION: 96 % | HEART RATE: 90 BPM | RESPIRATION RATE: 18 BRPM

## 2022-06-08 VITALS
HEART RATE: 79 BPM | OXYGEN SATURATION: 98 % | SYSTOLIC BLOOD PRESSURE: 136 MMHG | RESPIRATION RATE: 18 BRPM | TEMPERATURE: 98 F | DIASTOLIC BLOOD PRESSURE: 79 MMHG

## 2022-06-08 DIAGNOSIS — T83.091A OTHER MECHANICAL COMPLICATION OF INDWELLING URETHRAL CATHETER, INITIAL ENCOUNTER: ICD-10-CM

## 2022-06-08 DIAGNOSIS — N40.0 BENIGN PROSTATIC HYPERPLASIA WITHOUT LOWER URINARY TRACT SYMPTOMS: ICD-10-CM

## 2022-06-08 DIAGNOSIS — R10.30 LOWER ABDOMINAL PAIN, UNSPECIFIED: ICD-10-CM

## 2022-06-08 DIAGNOSIS — Z87.440 PERSONAL HISTORY OF URINARY (TRACT) INFECTIONS: ICD-10-CM

## 2022-06-08 DIAGNOSIS — R14.0 ABDOMINAL DISTENSION (GASEOUS): ICD-10-CM
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
## 2022-06-08 DIAGNOSIS — Z20.822 CONTACT WITH AND (SUSPECTED) EXPOSURE TO COVID-19: ICD-10-CM

## 2022-06-08 LAB
ALBUMIN SERPL ELPH-MCNC: 4.5 G/DL — SIGNIFICANT CHANGE UP (ref 3.3–5)
ALP SERPL-CCNC: 78 U/L — SIGNIFICANT CHANGE UP (ref 40–120)
ALT FLD-CCNC: 10 U/L — SIGNIFICANT CHANGE UP (ref 10–45)
ANION GAP SERPL CALC-SCNC: 11 MMOL/L — SIGNIFICANT CHANGE UP (ref 5–17)
APPEARANCE UR: CLEAR — SIGNIFICANT CHANGE UP
AST SERPL-CCNC: 15 U/L — SIGNIFICANT CHANGE UP (ref 10–40)
BACTERIA # UR AUTO: PRESENT /HPF
BASOPHILS # BLD AUTO: 0.02 K/UL — SIGNIFICANT CHANGE UP (ref 0–0.2)
BASOPHILS NFR BLD AUTO: 0.3 % — SIGNIFICANT CHANGE UP (ref 0–2)
BILIRUB SERPL-MCNC: 2.4 MG/DL — HIGH (ref 0.2–1.2)
BILIRUB UR-MCNC: NEGATIVE — SIGNIFICANT CHANGE UP
BUN SERPL-MCNC: 10 MG/DL — SIGNIFICANT CHANGE UP (ref 7–23)
CALCIUM SERPL-MCNC: 9.3 MG/DL — SIGNIFICANT CHANGE UP (ref 8.4–10.5)
CHLORIDE SERPL-SCNC: 103 MMOL/L — SIGNIFICANT CHANGE UP (ref 96–108)
CO2 SERPL-SCNC: 25 MMOL/L — SIGNIFICANT CHANGE UP (ref 22–31)
COLOR SPEC: YELLOW — SIGNIFICANT CHANGE UP
CREAT SERPL-MCNC: 0.76 MG/DL — SIGNIFICANT CHANGE UP (ref 0.5–1.3)
DIFF PNL FLD: ABNORMAL
EGFR: 105 ML/MIN/1.73M2 — SIGNIFICANT CHANGE UP
EOSINOPHIL # BLD AUTO: 0.01 K/UL — SIGNIFICANT CHANGE UP (ref 0–0.5)
EOSINOPHIL NFR BLD AUTO: 0.1 % — SIGNIFICANT CHANGE UP (ref 0–6)
EPI CELLS # UR: SIGNIFICANT CHANGE UP /HPF (ref 0–5)
GLUCOSE SERPL-MCNC: 99 MG/DL — SIGNIFICANT CHANGE UP (ref 70–99)
GLUCOSE UR QL: NEGATIVE — SIGNIFICANT CHANGE UP
HCT VFR BLD CALC: 42.2 % — SIGNIFICANT CHANGE UP (ref 39–50)
HGB BLD-MCNC: 13.9 G/DL — SIGNIFICANT CHANGE UP (ref 13–17)
IMM GRANULOCYTES NFR BLD AUTO: 0.3 % — SIGNIFICANT CHANGE UP (ref 0–1.5)
KETONES UR-MCNC: NEGATIVE — SIGNIFICANT CHANGE UP
LEUKOCYTE ESTERASE UR-ACNC: ABNORMAL
LYMPHOCYTES # BLD AUTO: 0.91 K/UL — LOW (ref 1–3.3)
LYMPHOCYTES # BLD AUTO: 12.9 % — LOW (ref 13–44)
MCHC RBC-ENTMCNC: 30.2 PG — SIGNIFICANT CHANGE UP (ref 27–34)
MCHC RBC-ENTMCNC: 32.9 GM/DL — SIGNIFICANT CHANGE UP (ref 32–36)
MCV RBC AUTO: 91.7 FL — SIGNIFICANT CHANGE UP (ref 80–100)
MONOCYTES # BLD AUTO: 0.34 K/UL — SIGNIFICANT CHANGE UP (ref 0–0.9)
MONOCYTES NFR BLD AUTO: 4.8 % — SIGNIFICANT CHANGE UP (ref 2–14)
NEUTROPHILS # BLD AUTO: 5.73 K/UL — SIGNIFICANT CHANGE UP (ref 1.8–7.4)
NEUTROPHILS NFR BLD AUTO: 81.6 % — HIGH (ref 43–77)
NITRITE UR-MCNC: POSITIVE
NRBC # BLD: 0 /100 WBCS — SIGNIFICANT CHANGE UP (ref 0–0)
PH UR: 7 — SIGNIFICANT CHANGE UP (ref 5–8)
PLATELET # BLD AUTO: 183 K/UL — SIGNIFICANT CHANGE UP (ref 150–400)
POTASSIUM SERPL-MCNC: 4.1 MMOL/L — SIGNIFICANT CHANGE UP (ref 3.5–5.3)
POTASSIUM SERPL-SCNC: 4.1 MMOL/L — SIGNIFICANT CHANGE UP (ref 3.5–5.3)
PROT SERPL-MCNC: 7.6 G/DL — SIGNIFICANT CHANGE UP (ref 6–8.3)
PROT UR-MCNC: NEGATIVE MG/DL — SIGNIFICANT CHANGE UP
RBC # BLD: 4.6 M/UL — SIGNIFICANT CHANGE UP (ref 4.2–5.8)
RBC # FLD: 12.2 % — SIGNIFICANT CHANGE UP (ref 10.3–14.5)
RBC CASTS # UR COMP ASSIST: ABNORMAL /HPF
SARS-COV-2 RNA SPEC QL NAA+PROBE: NEGATIVE — SIGNIFICANT CHANGE UP
SODIUM SERPL-SCNC: 139 MMOL/L — SIGNIFICANT CHANGE UP (ref 135–145)
SP GR SPEC: 1.01 — SIGNIFICANT CHANGE UP (ref 1–1.03)
UROBILINOGEN FLD QL: 1 E.U./DL — SIGNIFICANT CHANGE UP
WBC # BLD: 7.03 K/UL — SIGNIFICANT CHANGE UP (ref 3.8–10.5)
WBC # FLD AUTO: 7.03 K/UL — SIGNIFICANT CHANGE UP (ref 3.8–10.5)
WBC UR QL: > 10 /HPF

## 2022-06-08 PROCEDURE — 36415 COLL VENOUS BLD VENIPUNCTURE: CPT

## 2022-06-08 PROCEDURE — 99283 EMERGENCY DEPT VISIT LOW MDM: CPT

## 2022-06-08 PROCEDURE — 81001 URINALYSIS AUTO W/SCOPE: CPT

## 2022-06-08 PROCEDURE — 80053 COMPREHEN METABOLIC PANEL: CPT

## 2022-06-08 PROCEDURE — 87635 SARS-COV-2 COVID-19 AMP PRB: CPT

## 2022-06-08 PROCEDURE — 51702 INSERT TEMP BLADDER CATH: CPT

## 2022-06-08 PROCEDURE — 99284 EMERGENCY DEPT VISIT MOD MDM: CPT

## 2022-06-08 PROCEDURE — 87186 SC STD MICRODIL/AGAR DIL: CPT

## 2022-06-08 PROCEDURE — 87086 URINE CULTURE/COLONY COUNT: CPT

## 2022-06-08 PROCEDURE — 85025 COMPLETE CBC W/AUTO DIFF WBC: CPT

## 2022-06-08 RX ORDER — CEFPODOXIME PROXETIL 100 MG
1 TABLET ORAL
Qty: 20 | Refills: 0
Start: 2022-06-08 | End: 2022-06-17

## 2022-06-08 RX ORDER — SODIUM CHLORIDE 9 MG/ML
1000 INJECTION INTRAMUSCULAR; INTRAVENOUS; SUBCUTANEOUS ONCE
Refills: 0 | Status: COMPLETED | OUTPATIENT
Start: 2022-06-08 | End: 2022-06-08

## 2022-06-08 RX ORDER — CEFPODOXIME PROXETIL 100 MG
200 TABLET ORAL ONCE
Refills: 0 | Status: COMPLETED | OUTPATIENT
Start: 2022-06-08 | End: 2022-06-08

## 2022-06-08 RX ADMIN — Medication 200 MILLIGRAM(S): at 18:02

## 2022-06-08 RX ADMIN — SODIUM CHLORIDE 1000 MILLILITER(S): 9 INJECTION INTRAMUSCULAR; INTRAVENOUS; SUBCUTANEOUS at 16:45

## 2022-06-08 NOTE — ED PROVIDER NOTE - CLINICAL SUMMARY MEDICAL DECISION MAKING FREE TEXT BOX
55 y/o M presents to ED c/o "clogged valdez" and suprapubic abdominal pain and distension. Pt is requesting valdez cath replacement and UA.     ED Course:  Vital signs noted. Pt is afebrile. All other vital signs within normal limits. Will replace valdez and give IV fluids. Plan for labs including UA. 55 y/o M presents to ED c/o "clogged valdez" and suprapubic abdominal pain and distension. Pt is requesting valdez cath replacement and UA.     ED Course:  Vital signs noted. Pt is afebrile. All other vital signs within normal limits. Will replace valdez and give IV fluids. Plan for labs including UA. Labs WNL. UA noted with infection. ?colonization. However pt states that he feels like he has a UTI. Treated with abx and Rx given. Non-toxic appearing and stable for discharge. To follow up outpatient. Strict return precautions given.

## 2022-06-08 NOTE — ED PROVIDER NOTE - OBJECTIVE STATEMENT
55 y/o M with PMHx BPH with chronic indwelling valdez and chronic UTIs presents to ED with "clogged valdez." Pt states he has had a chronic indwelling valdez for the past year and the current one was placed 5 months ago. Pt reports he has been unable to follow up and have the valdez changed as he is too busy with work. This morning pt emptied the leg bag and has not had urine output in the past 6-7 hours. Pt also states "I have a UTI" and is requesting valdez cath replacement and UA. Denies fever, chills, or flank pain. Admits to suprapubic abdominal pain, distention, and discomfort. 57 y/o M with PMHx BPH with chronic indwelling valdez and chronic UTIs presents to ED with "clogged valdez." Pt states he has had a chronic indwelling valdez for the past 2 years and the current one was placed 5 months ago. Pt reports he has been unable to follow up and have the valdez changed as he is too busy with work. This morning pt emptied the leg bag and has not had urine output in the past 6-7 hours. Pt also states "I have a UTI" and is requesting valdez cath replacement and UA. Denies fever, chills, or flank pain. Admits to suprapubic abdominal pain, distention, and discomfort.

## 2022-06-08 NOTE — ED ADULT NURSE NOTE - ALCOHOL PRE SCREEN (AUDIT - C)
SUBJECTIVE:  HPI: Diego Atwood is a 6 year old male with phimosis and penile adhesions status post circumcision on 7/5/2018.  He was straining.  His mother was worried about possible adhesions that may be obstructing the meatus.      The patient is voiding and eating well. They are having normal BMs.  They are here with mother.    OBJECTIVE:   Physical Exam:       Visit Vitals  Temp 99.2 °F (37.3 °C)   Ht 4' 1\" (1.245 m)   Wt 24.9 kg (55 lb)   BMI 16.11 kg/m²      Constitutional: Well developed, well nourished male   Psych: Alert 6 year old. Cooperative.   Skin: Normal color/tone. Without obvious lesions  Lungs: No laboring noted  Abdomen: Soft, non-tender, not distended. No organomegaly.  Lymph: No inguinal nodes  Penis: moderate meatal stenosis, adhesions have resolved.   Testes: Bilaterally descended, no mass, palpable vas, no hydrocele, no varicocele  Back:  No tuft of hair or dimpling.  Extremities: No cyanosis, clubbing, or edema     ASSESSMENT:    Penile adhesions - resolved  Phimosis - resolved  Meatal stenosis     PLAN:   Discussed treatment options including observation versus meatotomy  We will observe for now  If having more frequency or urgency will consider meatotomy  Follow up in 6 months    The patient's mother verbalized understanding.     Roxy Goldstein DO     Statement Selected

## 2022-06-08 NOTE — ED PROVIDER NOTE - PATIENT PORTAL LINK FT
You can access the FollowMyHealth Patient Portal offered by [facility] by registering at the following website: [email or web address]. By joining [employer]’s FollowMyHealth portal, you will also be able to view your health information using other applications (apps) compatible with our system.

## 2022-06-08 NOTE — ED PROVIDER NOTE - PHYSICAL EXAMINATION
CONSTITUTIONAL: Well appearing, awake, alert, oriented and in no apparent distress.  ENMT: Airway patent.  EYES: Clear bilaterally.  CARDIAC: Normal rate, regular rhythm.  Heart sounds S1, S2.   RESPIRATORY: Breath sounds clear and equal bilaterally.  GASTROINTESTINAL: Abdomen soft, suprapubic abdominal distension and tenderness, no guarding. No CVA tenderness.   GENITOURINARY: Jacques cath noted with leg bag. No urine in leg bag. No blood around penis.   MUSCULOSKELETAL: Spine appears normal, range of motion is not limited, no muscle or joint tenderness  NEUROLOGICAL: Alert and oriented, no focal deficits, no motor or sensory deficits.  SKIN: Skin normal color for race, warm, dry and intact. No evidence of rash.  PSYCHIATRIC: Alert and oriented. normal mood and affect. no apparent risk to self or others. CONSTITUTIONAL: Well appearing, awake, alert, oriented and in no apparent distress.  ENMT: Airway patent.  EYES: Clear bilaterally.  CARDIAC: Normal rate, regular rhythm.  Heart sounds S1, S2.   RESPIRATORY: Breath sounds clear and equal bilaterally.  GASTROINTESTINAL: Abdomen soft, suprapubic abdominal distension and tenderness, no guarding. No CVA tenderness.   GENITOURINARY: Jacques cath noted with leg bag. No urine in leg bag. No blood around penis.   MUSCULOSKELETAL: Spine appears normal, range of motion is not limited, no muscle or joint tenderness  NEUROLOGICAL: Alert and oriented, no focal deficits  SKIN: Skin normal color for race, warm, dry and intact. .  PSYCHIATRIC: Alert and oriented. normal mood and affect.

## 2022-06-08 NOTE — ED ADULT NURSE NOTE - OBJECTIVE STATEMENT
.  56years male alert mental state (AOX3) received on foot.  -Allergy: N/A.  -complain of urinary retention.  pt has valdez cath. it was changed a few month ago.  -denied fever, chills, abdomen pain, SOB, chest pain.  Pt is in the bed comfortably at this time. Will continue to monitor and document any changes.

## 2022-06-08 NOTE — ED ADULT TRIAGE NOTE - CHIEF COMPLAINT QUOTE
Pt presents reporting clogged valdez for hours. Pt reports chronic valdez for prostate problems, reports he is months overdue for valdez exchange. Pt also suspects he has UTI, denies fevers/chills.

## 2022-06-08 NOTE — ED ADULT NURSE NOTE - CHIEF COMPLAINT QUOTE
Pt presents reporting clogged valdze for hours. Pt reports chronic valdez for prostate problems, reports he is months overdue for valdez exchange. Pt also suspects he has UTI, denies fevers/chills.

## 2022-06-10 LAB
-  AMPICILLIN/SULBACTAM: SIGNIFICANT CHANGE UP
-  AMPICILLIN/SULBACTAM: SIGNIFICANT CHANGE UP
-  AMPICILLIN: SIGNIFICANT CHANGE UP
-  AMPICILLIN: SIGNIFICANT CHANGE UP
-  CEFAZOLIN: SIGNIFICANT CHANGE UP
-  CEFAZOLIN: SIGNIFICANT CHANGE UP
-  CEFTRIAXONE: SIGNIFICANT CHANGE UP
-  CEFTRIAXONE: SIGNIFICANT CHANGE UP
-  CIPROFLOXACIN: SIGNIFICANT CHANGE UP
-  CIPROFLOXACIN: SIGNIFICANT CHANGE UP
-  ERTAPENEM: SIGNIFICANT CHANGE UP
-  ERTAPENEM: SIGNIFICANT CHANGE UP
-  GENTAMICIN: SIGNIFICANT CHANGE UP
-  GENTAMICIN: SIGNIFICANT CHANGE UP
-  NITROFURANTOIN: SIGNIFICANT CHANGE UP
-  NITROFURANTOIN: SIGNIFICANT CHANGE UP
-  PIPERACILLIN/TAZOBACTAM: SIGNIFICANT CHANGE UP
-  PIPERACILLIN/TAZOBACTAM: SIGNIFICANT CHANGE UP
-  TOBRAMYCIN: SIGNIFICANT CHANGE UP
-  TOBRAMYCIN: SIGNIFICANT CHANGE UP
-  TRIMETHOPRIM/SULFAMETHOXAZOLE: SIGNIFICANT CHANGE UP
-  TRIMETHOPRIM/SULFAMETHOXAZOLE: SIGNIFICANT CHANGE UP
CULTURE RESULTS: SIGNIFICANT CHANGE UP
METHOD TYPE: SIGNIFICANT CHANGE UP
METHOD TYPE: SIGNIFICANT CHANGE UP
ORGANISM # SPEC MICROSCOPIC CNT: SIGNIFICANT CHANGE UP
SPECIMEN SOURCE: SIGNIFICANT CHANGE UP

## 2022-06-15 ENCOUNTER — EMERGENCY (EMERGENCY)
Facility: HOSPITAL | Age: 57
LOS: 1 days | Discharge: HOME | End: 2022-06-15
Admitting: EMERGENCY MEDICINE
Payer: MEDICAID

## 2022-06-15 VITALS
HEART RATE: 111 BPM | SYSTOLIC BLOOD PRESSURE: 165 MMHG | DIASTOLIC BLOOD PRESSURE: 83 MMHG | OXYGEN SATURATION: 99 % | RESPIRATION RATE: 16 BRPM | TEMPERATURE: 98 F | HEIGHT: 73 IN | WEIGHT: 190.04 LBS

## 2022-06-15 VITALS
DIASTOLIC BLOOD PRESSURE: 66 MMHG | HEART RATE: 85 BPM | TEMPERATURE: 98 F | RESPIRATION RATE: 18 BRPM | SYSTOLIC BLOOD PRESSURE: 162 MMHG | OXYGEN SATURATION: 98 %
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
## 2022-06-15 DIAGNOSIS — T83.031A LEAKAGE OF INDWELLING URETHRAL CATHETER, INITIAL ENCOUNTER: ICD-10-CM

## 2022-06-15 DIAGNOSIS — Z16.19 RESISTANCE TO OTHER SPECIFIED BETA LACTAM ANTIBIOTICS: ICD-10-CM

## 2022-06-15 DIAGNOSIS — N40.0 BENIGN PROSTATIC HYPERPLASIA WITHOUT LOWER URINARY TRACT SYMPTOMS: ICD-10-CM
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
## 2022-06-15 LAB
APPEARANCE UR: ABNORMAL
BACTERIA # UR AUTO: PRESENT /HPF
BILIRUB UR-MCNC: ABNORMAL
COLOR SPEC: ABNORMAL
DIFF PNL FLD: ABNORMAL
EPI CELLS # UR: SIGNIFICANT CHANGE UP /HPF (ref 0–5)
GLUCOSE UR QL: NEGATIVE — SIGNIFICANT CHANGE UP
KETONES UR-MCNC: ABNORMAL MG/DL
LEUKOCYTE ESTERASE UR-ACNC: ABNORMAL
NITRITE UR-MCNC: POSITIVE
PH UR: 6.5 — SIGNIFICANT CHANGE UP (ref 5–8)
PROT UR-MCNC: 100 MG/DL
RBC CASTS # UR COMP ASSIST: ABNORMAL /HPF
SP GR SPEC: 1.02 — SIGNIFICANT CHANGE UP (ref 1–1.03)
UROBILINOGEN FLD QL: 2 E.U./DL
WBC UR QL: > 10 /HPF

## 2022-06-15 PROCEDURE — 81001 URINALYSIS AUTO W/SCOPE: CPT

## 2022-06-15 PROCEDURE — 87086 URINE CULTURE/COLONY COUNT: CPT

## 2022-06-15 PROCEDURE — 51702 INSERT TEMP BLADDER CATH: CPT

## 2022-06-15 PROCEDURE — 99283 EMERGENCY DEPT VISIT LOW MDM: CPT

## 2022-06-15 RX ORDER — CEFPODOXIME PROXETIL 100 MG
200 TABLET ORAL ONCE
Refills: 0 | Status: COMPLETED | OUTPATIENT
Start: 2022-06-15 | End: 2022-06-15

## 2022-06-15 RX ADMIN — Medication 200 MILLIGRAM(S): at 08:55

## 2022-06-15 NOTE — ED PROVIDER NOTE - NSFOLLOWUPINSTRUCTIONS_ED_ALL_ED_FT
Make sure to  your antibiotics as take full course as prescribed    Urinary Tract Infection    A urinary tract infection (UTI) is an infection of any part of the urinary tract, which includes the kidneys, ureters, bladder, and urethra. Risk factors include ignoring your need to urinate, wiping back to front if female, being an uncircumcised male, and having diabetes or a weak immune system. Symptoms include frequent urination, pain or burning with urination, foul smelling urine, cloudy urine, pain in the lower abdomen, blood in the urine, and fever. If you were prescribed an antibiotic medicine, take it as told by your health care provider. Do not stop taking the antibiotic even if you start to feel better.    SEEK IMMEDIATE MEDICAL CARE IF YOU HAVE ANY OF THE FOLLOWING SYMPTOMS: severe back or abdominal pain, fever, inability to keep fluids or medicine down, dizziness/lightheadedness, or a change in mental status.    Indwelling Urinary Catheter Care, Adult      An indwelling urinary catheter is a thin, flexible, germ-free (sterile) tube that is placed into the bladder to help drain urine out of the body. The catheter is inserted into the part of the body that drains urine from the bladder (urethra). Urine drains from the catheter into a drainage bag outside of the body.    Taking good care of your catheter will keep it working properly and help to prevent problems from developing.      What are the risks?    •Bacteria may get into your bladder and cause a urinary tract infection.      •Urine flow can become blocked. This can happen if the catheter is not working correctly, or if you have sediment or a blood clot in your bladder or the catheter.      •Tissue near the catheter may become irritated and bleed.        How to wear your catheter and your drainage bag    Supplies needed     •Adhesive tape or a leg strap.      •Alcohol wipe or soap and water (if you use tape).      •A clean towel (if you use tape).      •Overnight drainage bag.      •Smaller drainage bag (leg bag).      Wearing your catheter and bag     Use adhesive tape or a leg strap to attach your catheter to your leg.  •Make sure the catheter is not pulled tight.      •If a leg strap gets wet, replace it with a dry one.    •If you use adhesive tape:  1.Use an alcohol wipe or soap and water to wash off any stickiness on your skin where you had tape before.      2.Use a clean towel to pat-dry the area.      3.Apply the new tape.        You should have received a large overnight drainage bag and a smaller leg bag that fits underneath clothing.   •You may wear the overnight bag at any time, but you should not wear the leg bag at night.      •Always wear the leg bag below your knee.      •Make sure the overnight drainage bag is always lower than the level of your bladder, but do not let it touch the floor. Before you go to sleep, hang the bag inside a wastebasket that is covered by a clean plastic bag.        How to care for your skin around the catheter      Female anatomy showing the bladder, labia, and urethra and an indwelling urinary catheter in the bladder.       Male anatomy showing the bladder, penis, and urethra and an indwelling urinary catheter in the bladder.     Supplies needed     •A clean washcloth.      •Water and mild soap.      •A clean towel.      Caring for your skin and catheter   •Every day, use a clean washcloth and soapy water to clean the skin around your catheter.  1.Wash your hands with soap and water.      2.Wet a washcloth in warm water and mild soap.    3.Clean the skin around your urethra.•If you are female:  •Use one hand to gently spread the folds of skin around your vagina (labia).      •With the washcloth in your other hand, wipe the inner side of your labia on each side. Do this in a front-to-back direction.      •If you are male:  •Use one hand to pull back any skin that covers the end of your penis (foreskin).      •With the washcloth in your other hand, wipe your penis in small circles. Start wiping at the tip of your penis, then move outward from the catheter.      •Move the foreskin back in place, if this applies.          4.With your free hand, hold the catheter close to where it enters your body. Keep holding the catheter during cleaning so it does not get pulled out.    5.Use your other hand to clean the catheter with the washcloth.  •Only wipe downward on the catheter, toward the bag.      •Do not wipe upward toward your body, because that may push bacteria into your urethra and cause infection.        6.Use a clean towel to pat-dry the catheter and the skin around it. Make sure to wipe off all soap.      7.Wash your hands with soap and water.        •Shower every day. Do not take baths.      • Do not use cream, ointment, or lotion on the area where the catheter enters your body, unless your health care provider tells you to do that.      • Do not use powders, sprays, or lotions on your genital area.    •Check your skin around the catheter every day for signs of infection. Check for:  •Redness, swelling, or pain.      •Fluid or blood.      •Warmth.      •Pus or a bad smell.          How to empty the drainage bag    Supplies needed     •Rubbing alcohol.      •Gauze pad or cotton ball.      •Adhesive tape or a leg strap.      Emptying the bag     Empty your drainage bag (your overnight drainage bag or your leg bag) when it is ?–½ full, or at least 2–3 times a day. Clean the drainage bag according to the 's instructions or as told by your health care provider.  1.Wash your hands with soap and water.      2.Detach the drainage bag from your leg.      3.Hold the drainage bag over the toilet or a clean container. Make sure the drainage bag is lower than your hips and bladder. This stops urine from going back into the tubing and into your bladder.      4.Open the pour spout at the bottom of the bag.      5.Empty the urine into the toilet or container. Do not let the pour spout touch any surface. This precaution is important to prevent bacteria from getting in the bag and causing infection.      6.Apply rubbing alcohol to a gauze pad or cotton ball.      7.Use the gauze pad or cotton ball to clean the pour spout.      8.Close the pour spout.      9.Attach the bag to your leg with adhesive tape or a leg strap.      10.Wash your hands with soap and water.        How to change the drainage bag    Supplies needed:     •Alcohol wipes.      •A clean drainage bag.      •Adhesive tape or a leg strap.      Changing the bag     Replace your drainage bag with a clean bag if it leaks, starts to smell bad, or looks dirty.  1.Wash your hands with soap and water.      2.Detach the dirty drainage bag from your leg.      3.Pinch the catheter with your fingers so that urine does not spill out.      4.Disconnect the catheter tube from the drainage tube at the connection valve. Do not let the tubes touch any surface.      5.Clean the end of the catheter tube with an alcohol wipe. Use a different alcohol wipe to clean the end of the drainage tube.      6.Connect the catheter tube to the drainage tube of the clean bag.      7.Attach the clean bag to your leg with adhesive tape or a leg strap. Avoid attaching the new bag too tightly.      8.Wash your hands with soap and water.        General instructions  Washing hands with soap and water.   • Never pull on your catheter or try to remove it. Pulling can damage your internal tissues.      •Always wash your hands before and after you handle your catheter or drainage bag. Use a mild, fragrance-free soap. If soap and water are not available, use hand .      •Always make sure there are no twists or bends (kinks) in the catheter tube.      •Always make sure there are no leaks in the catheter or drainage bag.      •Drink enough fluid to keep your urine pale yellow.      • Do not take baths, swim, or use a hot tub.      •If you are female, wipe from front to back after having a bowel movement.        Contact a health care provider if:    •Your urine is cloudy.      •Your urine smells unusually bad.      •Your catheter gets clogged.      •Your catheter starts to leak.      •Your bladder feels full.        Get help right away if:    •You have redness, swelling, or pain where the catheter enters your body.      •You have fluid, blood, pus, or a bad smell coming from the area where the catheter enters your body.      •The area where the catheter enters your body feels warm to the touch.      •You have a fever.      •You have pain in your abdomen, legs, lower back, or bladder.      •You see blood in the catheter.      •Your urine is pink or red.      •You have nausea, vomiting, or chills.      •Your urine is not draining into the bag.      •Your catheter gets pulled out.        Summary    •An indwelling urinary catheter is a thin, flexible, germ-free (sterile) tube that is placed into the bladder to help drain urine out of the body.      •The catheter is inserted into the part of the body that drains urine from the bladder (urethra).      •Take good care of your catheter to keep it working properly and help prevent problems from developing.      •Always wash your hands before and after you handle your catheter or drainage bag.      • Never pull on your catheter or try to remove it.      This information is not intended to replace advice given to you by your health care provider. Make sure you discuss any questions you have with your health care provider.

## 2022-06-15 NOTE — ED ADULT NURSE NOTE - OBJECTIVE STATEMENT
Pt is 56 y.o male client complaining of urinary catheter leaking. Pt A&Ox4. Pt is able to communicate and has a steady gait. Pt is requesting for a [place] 14 catheter. Hematuria not noted and any other urinary sx. Catheter was inserted last Sunday at the ED. Denies any chest pain, fever, chills, dysuria, hematuria, sob, weakness or bowel sx. No known medical condition.

## 2022-06-15 NOTE — ED PROVIDER NOTE - PHYSICAL EXAMINATION
Vitals reviewed  Gen: well appearing, nad, speaking in full sentences  Skin: wwp, no rash/lesions  HEENT: ncat, eomi, mmm  CV: rrr, no audible m/r/g  Resp: symmetrical expansion, ctab, no w/r/r  Abd: nondistended, soft, nontender, no rebound/guarding, no cvat  : coude valdez in place, yellow urine output in leg bag  Ext: FROM throughout, no peripheral edema  Neuro: alert/oriented, no focal deficits, steady gait

## 2022-06-15 NOTE — ED ADULT NURSE REASSESSMENT NOTE - NS ED NURSE REASSESS COMMENT FT1
Urinary output noted to be approx 300ml, urine now clearing from blood tinged to clear.  Urinary drainage bag changed to leg bag, teach back performed.

## 2022-06-15 NOTE — ED ADULT NURSE REASSESSMENT NOTE - NS ED NURSE REASSESS COMMENT FT1
Assumed care of patient at this time.  Patient awake and alert, valdez catheter noted to have small about of bloody urine in drainage bag, patient states "give me water, I need to drink it to flush out the blood."  Water provided, patient denies any pain.

## 2022-06-15 NOTE — ED PROVIDER NOTE - PATIENT PORTAL LINK FT
You can access the FollowMyHealth Patient Portal offered by [facility] by registering at the following website: [email or web address]. By joining [employer]’s FollowMyHealth portal, you will also be able to view your health information using other applications (apps) compatible with our system.

## 2022-06-15 NOTE — ED PROVIDER NOTE - CLINICAL SUMMARY MEDICAL DECISION MAKING FREE TEXT BOX
56 M pmh bph and chronic indwelling valdez, chronic UTIs presents requesting valdez change as coude leaking since changed on 6/8.  states issue w/ coude in past and wants normal valdez.  also admits did not  abx for uti.  no f/c, abd or back pain.  valdez replaced without complication.  UA + infection, rpt cult sent.  prescribed appropriate abx based on last cult, will give PO dose cefpodoxime and instructed to  abx today.  can f/u outpt with his urologist.  discussed strict return parameters

## 2022-06-15 NOTE — ED PROVIDER NOTE - OBJECTIVE STATEMENT
56 M pmh bph and chronic indwelling valdez, chronic UTIs presents requesting valdez change.  pt reports last valdez change on 6/8 at [facility] ED, reports coude was placed and has been leaking since dc.  states he has had issues with coude in past but didnt tell RN at prior visit as he didnt want to bother them.  Pt was prescribed abx for UTI but reports he was unable to , planning to get them today.  denies f/c, headache, dizziness, fainting, chest pain, sob, abd pain, nvd, constipation, hematuria, urinary retention, trauma

## 2022-06-17 LAB
CULTURE RESULTS: SIGNIFICANT CHANGE UP
SPECIMEN SOURCE: SIGNIFICANT CHANGE UP

## 2022-06-26 NOTE — ED ADULT NURSE NOTE - ED CARDIAC RATE
normal
impairments found/functional limitations in following categories/rehab potential/therapy frequency/anticipated discharge recommendation

## 2022-08-07 ENCOUNTER — EMERGENCY (EMERGENCY)
Facility: HOSPITAL | Age: 57
LOS: 1 days | Discharge: HOME | End: 2022-08-07
Attending: EMERGENCY MEDICINE | Admitting: EMERGENCY MEDICINE
Payer: MEDICAID

## 2022-08-07 VITALS
DIASTOLIC BLOOD PRESSURE: 90 MMHG | OXYGEN SATURATION: 97 % | RESPIRATION RATE: 18 BRPM | SYSTOLIC BLOOD PRESSURE: 150 MMHG | HEART RATE: 81 BPM | TEMPERATURE: 98 F | HEIGHT: 73 IN | WEIGHT: 190.04 LBS

## 2022-08-07 VITALS
TEMPERATURE: 98 F | RESPIRATION RATE: 18 BRPM | SYSTOLIC BLOOD PRESSURE: 153 MMHG | DIASTOLIC BLOOD PRESSURE: 86 MMHG | OXYGEN SATURATION: 99 % | HEART RATE: 72 BPM

## 2022-08-07 LAB
ALBUMIN SERPL ELPH-MCNC: 4.5 G/DL — SIGNIFICANT CHANGE UP (ref 3.3–5)
ALP SERPL-CCNC: 66 U/L — SIGNIFICANT CHANGE UP (ref 40–120)
ALT FLD-CCNC: 7 U/L — LOW (ref 10–45)
ANION GAP SERPL CALC-SCNC: 10 MMOL/L — SIGNIFICANT CHANGE UP (ref 5–17)
APPEARANCE UR: CLEAR — SIGNIFICANT CHANGE UP
AST SERPL-CCNC: 15 U/L — SIGNIFICANT CHANGE UP (ref 10–40)
BACTERIA # UR AUTO: ABNORMAL /HPF
BASOPHILS # BLD AUTO: 0.02 K/UL — SIGNIFICANT CHANGE UP (ref 0–0.2)
BASOPHILS NFR BLD AUTO: 0.4 % — SIGNIFICANT CHANGE UP (ref 0–2)
BILIRUB SERPL-MCNC: 1.3 MG/DL — HIGH (ref 0.2–1.2)
BILIRUB UR-MCNC: NEGATIVE — SIGNIFICANT CHANGE UP
BUN SERPL-MCNC: 23 MG/DL — SIGNIFICANT CHANGE UP (ref 7–23)
CALCIUM SERPL-MCNC: 8.9 MG/DL — SIGNIFICANT CHANGE UP (ref 8.4–10.5)
CHLORIDE SERPL-SCNC: 104 MMOL/L — SIGNIFICANT CHANGE UP (ref 96–108)
CO2 SERPL-SCNC: 26 MMOL/L — SIGNIFICANT CHANGE UP (ref 22–31)
COLOR SPEC: YELLOW — SIGNIFICANT CHANGE UP
COMMENT - URINE: SIGNIFICANT CHANGE UP
CREAT SERPL-MCNC: 0.92 MG/DL — SIGNIFICANT CHANGE UP (ref 0.5–1.3)
DIFF PNL FLD: ABNORMAL
EGFR: 98 ML/MIN/1.73M2 — SIGNIFICANT CHANGE UP
EOSINOPHIL # BLD AUTO: 0.05 K/UL — SIGNIFICANT CHANGE UP (ref 0–0.5)
EOSINOPHIL NFR BLD AUTO: 1 % — SIGNIFICANT CHANGE UP (ref 0–6)
EPI CELLS # UR: SIGNIFICANT CHANGE UP /HPF (ref 0–5)
GLUCOSE SERPL-MCNC: 106 MG/DL — HIGH (ref 70–99)
GLUCOSE UR QL: NEGATIVE — SIGNIFICANT CHANGE UP
HCT VFR BLD CALC: 38.4 % — LOW (ref 39–50)
HGB BLD-MCNC: 12.6 G/DL — LOW (ref 13–17)
IMM GRANULOCYTES NFR BLD AUTO: 0.2 % — SIGNIFICANT CHANGE UP (ref 0–1.5)
KETONES UR-MCNC: ABNORMAL MG/DL
LEUKOCYTE ESTERASE UR-ACNC: ABNORMAL
LYMPHOCYTES # BLD AUTO: 0.96 K/UL — LOW (ref 1–3.3)
LYMPHOCYTES # BLD AUTO: 19.6 % — SIGNIFICANT CHANGE UP (ref 13–44)
MCHC RBC-ENTMCNC: 29.4 PG — SIGNIFICANT CHANGE UP (ref 27–34)
MCHC RBC-ENTMCNC: 32.8 GM/DL — SIGNIFICANT CHANGE UP (ref 32–36)
MCV RBC AUTO: 89.7 FL — SIGNIFICANT CHANGE UP (ref 80–100)
MONOCYTES # BLD AUTO: 0.38 K/UL — SIGNIFICANT CHANGE UP (ref 0–0.9)
MONOCYTES NFR BLD AUTO: 7.8 % — SIGNIFICANT CHANGE UP (ref 2–14)
NEUTROPHILS # BLD AUTO: 3.48 K/UL — SIGNIFICANT CHANGE UP (ref 1.8–7.4)
NEUTROPHILS NFR BLD AUTO: 71 % — SIGNIFICANT CHANGE UP (ref 43–77)
NITRITE UR-MCNC: POSITIVE
NRBC # BLD: 0 /100 WBCS — SIGNIFICANT CHANGE UP (ref 0–0)
PH UR: 6 — SIGNIFICANT CHANGE UP (ref 5–8)
PLATELET # BLD AUTO: 170 K/UL — SIGNIFICANT CHANGE UP (ref 150–400)
POTASSIUM SERPL-MCNC: 3.4 MMOL/L — LOW (ref 3.5–5.3)
POTASSIUM SERPL-SCNC: 3.4 MMOL/L — LOW (ref 3.5–5.3)
PROT SERPL-MCNC: 7.4 G/DL — SIGNIFICANT CHANGE UP (ref 6–8.3)
PROT UR-MCNC: >=300 MG/DL
RBC # BLD: 4.28 M/UL — SIGNIFICANT CHANGE UP (ref 4.2–5.8)
RBC # FLD: 12.9 % — SIGNIFICANT CHANGE UP (ref 10.3–14.5)
RBC CASTS # UR COMP ASSIST: ABNORMAL /HPF
SARS-COV-2 RNA SPEC QL NAA+PROBE: NEGATIVE — SIGNIFICANT CHANGE UP
SODIUM SERPL-SCNC: 140 MMOL/L — SIGNIFICANT CHANGE UP (ref 135–145)
SP GR SPEC: >=1.03 — SIGNIFICANT CHANGE UP (ref 1–1.03)
UROBILINOGEN FLD QL: 1 E.U./DL — SIGNIFICANT CHANGE UP
WBC # BLD: 4.9 K/UL — SIGNIFICANT CHANGE UP (ref 3.8–10.5)
WBC # FLD AUTO: 4.9 K/UL — SIGNIFICANT CHANGE UP (ref 3.8–10.5)
WBC UR QL: ABNORMAL /HPF

## 2022-08-07 PROCEDURE — 81001 URINALYSIS AUTO W/SCOPE: CPT

## 2022-08-07 PROCEDURE — 99284 EMERGENCY DEPT VISIT MOD MDM: CPT

## 2022-08-07 PROCEDURE — 36415 COLL VENOUS BLD VENIPUNCTURE: CPT

## 2022-08-07 PROCEDURE — 87186 SC STD MICRODIL/AGAR DIL: CPT

## 2022-08-07 PROCEDURE — 80053 COMPREHEN METABOLIC PANEL: CPT

## 2022-08-07 PROCEDURE — 87635 SARS-COV-2 COVID-19 AMP PRB: CPT

## 2022-08-07 PROCEDURE — 99283 EMERGENCY DEPT VISIT LOW MDM: CPT | Mod: 25

## 2022-08-07 PROCEDURE — 85025 COMPLETE CBC W/AUTO DIFF WBC: CPT

## 2022-08-07 PROCEDURE — 87086 URINE CULTURE/COLONY COUNT: CPT

## 2022-08-07 PROCEDURE — 51702 INSERT TEMP BLADDER CATH: CPT

## 2022-08-07 RX ORDER — CEFPODOXIME PROXETIL 100 MG
1 TABLET ORAL
Qty: 14 | Refills: 0
Start: 2022-08-07 | End: 2022-08-13

## 2022-08-07 RX ORDER — CEFPODOXIME PROXETIL 100 MG
200 TABLET ORAL ONCE
Refills: 0 | Status: COMPLETED | OUTPATIENT
Start: 2022-08-07 | End: 2022-08-07

## 2022-08-07 RX ADMIN — Medication 200 MILLIGRAM(S): at 10:48

## 2022-08-07 NOTE — ED ADULT NURSE NOTE - NSIMPLEMENTINTERV_GEN_ALL_ED
Implemented All Universal Safety Interventions:  [place] to call system. Call bell, personal items and telephone within reach. Instruct patient to call for assistance. Room bathroom lighting operational. Non-slip footwear when patient is off stretcher. Physically safe environment: no spills, clutter or unnecessary equipment. Stretcher in lowest position, wheels locked, appropriate side rails in place.

## 2022-08-07 NOTE — ED PROVIDER NOTE - CARE PROVIDER_API CALL
Prashant Beauchamp  Urology  [address], [facility] [place] [address]  Phone: [phone]  Fax: [phone]  Follow Up Time:

## 2022-08-07 NOTE — ED PROVIDER NOTE - NSFOLLOWUPINSTRUCTIONS_ED_ALL_ED_FT
You had your valdez catheter changed today.  Take preventative antibiotics as prescribed.  Follow up with urologist recommended below.  Return to ED with any worsening symptoms or other concerns.          Urinary Retention in Men    WHAT YOU NEED TO KNOW:    Urinary retention is a condition that develops when your bladder does not empty completely when you urinate.   Male Reproductive System         DISCHARGE INSTRUCTIONS:    Medicines:   •Medicines can help decrease the size of your prostate, fight infection, and help you urinate more easily.      •Take your medicine as directed. Contact your healthcare provider if you think your medicine is not helping or if you have side effects. Tell him or her if you are allergic to any medicine. Keep a list of the medicines, vitamins, and herbs you take. Include the amounts, and when and why you take them. Bring the list or the pill bottles to follow-up visits. Carry your medicine list with you in case of an emergency.      Valdez catheter care: You may need a Valdez catheter for up to 2 weeks at home. Healthcare providers will give you a smaller leg bag to collect urine. Keep the bag below your waist. This will prevent urine from flowing back into your bladder and causing an infection or other problems. Also, keep the tube free of kinks so the urine will drain properly. Do not pull on the catheter. This can cause pain and bleeding, and may cause the catheter to come out. Ask your healthcare provider or urologist for more information on Valdez catheter care.    Urinate regularly: When your catheter is removed, do not let your bladder become too full before you urinate. Set regular times each day to urinate. Urinate as soon as you feel the need or at least every 3 hours while you are awake. Do not drink liquids before you go to bed. Urinate right before you go to bed.    Follow up with your healthcare provider or urologist as directed: Write down your questions so you remember to ask them during your visits.     Contact your healthcare provider or urologist if:   •You have a fever.      •You have pain when you urinate.      •You have blood in your urine.      •You have problems with your catheter.      •You have questions or concerns about your condition or care.      Return to the emergency department if:   •You have severe abdominal pain.      •You are breathing faster than usual.      •Your heartbeat is faster than usual.      •Your face, hands, feet, or ankles are swollen.          © Copyright [employer] 2022           back to top                          © Copyright [employer] 2022

## 2022-08-07 NOTE — ED PROVIDER NOTE - OBJECTIVE STATEMENT
55 y/o m with hx of chronic indwelling valdez secondary to BPH with multiple ER presentations for catheter change presents to ED for catheter change (last changed June 2022).  PT also concerned for possible infection secondary to odor and slight blood visualized.  Denies fever, chills, back pain, nausea, vomiting.  Currently does not have urologist because does not have insurance.

## 2022-08-07 NOTE — ED ADULT NURSE NOTE - OBJECTIVE STATEMENT
Patient presents to the ED complaining of his valdez catheter not draining, states that he had it changed 2 months ago. Denies any pain. No fever or chills.

## 2022-08-07 NOTE — ED PROVIDER NOTE - CLINICAL SUMMARY MEDICAL DECISION MAKING FREE TEXT BOX
Pt with chronic indwelling valdez presents requesting catheter exchange.  Nontoxic appearing - valdez replaced, basic labs for Cr check.  Pt requesting prophylactic antibiotics because worried about infection.  In process of obtaining Medicaid - will give urology follow up.

## 2022-08-07 NOTE — ED ADULT TRIAGE NOTE - CHIEF COMPLAINT QUOTE
Pt presents to ED c/o catheter complication. had valdez placed 1 month ago. reports this morning draining less. denies fevers, chills.

## 2022-08-07 NOTE — ED PROVIDER NOTE - INTERNATIONAL TRAVEL
Consult Date:  04/27/2020      REASON FOR CONSULTATION:    This consult has been requested by Dr. Berta Lal for chronic myelomonocytic leukemia.      HISTORY OF PRESENT ILLNESS:    Mr. Mack is an 83-year-old gentleman with newly diagnosed chronic myelomonocytic leukemia.  He was recently evaluated in clinic for leukocytosis, anemia and thrombocytopenia.  He had multiple investigations done.  Bone marrow biopsy on 04/16/2020 reveals a chronic myelomonocytic leukemia-1 (CMML-1) and also systemic mastocytosis.      The patient's CBC has been abnormal.  His WBC has been running around 70.  Hemoglobin has been running about 12.  Platelets have been around 100.      He has not been feeling well for the last few weeks.  His fatigue has been getting worse.  Appetite has been decreasing.  He has been losing weight.      The patient was brought to the emergency room on 04/26/2020 because of worsening weakness.  The patient said that his fatigue has been getting worse for the last few weeks.  His appetite has continued to decrease.  He has lost about 15 pounds of weight.      REVIEW OF SYSTEMS:  No headache.  Some lightheadedness. Intermittent flushing of face. No visual problem.  No ear pain or sore throat.  No neck pain.  No chest pain.  No shortness of breath.  No vomiting.  Mild nausea.  No urinary complaints.  No diarrhea or constipation.  No bleeding from any site.  No recurrent infection.      All other review of systems negative.      ALLERGIES:  REVIEWED.      MEDICATIONS:  Reviewed.      PAST MEDICAL HISTORY:   1.  Skin cancer, status post excision.   2.  Asthma.   3.  Anxiety.   4.  Chronic ischemic heart disease.   5.  Depression.   6.  Diverticulosis.   7.  Hypertension.   8.  Hiatal hernia.   9.  BPH, status post TURP.   10.  Back surgery.   11.  Sciatica.   12.  History of esophageal squamous cell carcinoma diagnosed in 03/2015.  Stage II after treated with concurrent chemoradiation.      SOCIAL HISTORY:    -He is a former smoker, quitting about 40 years ago.   -No alcohol use.      FAMILY HISTORY:   -No family history of cancer or blood disorder.      PHYSICAL EXAMINATION:   GENERAL:  He is alert, oriented x 3.  He looked weak.     VITAL SIGNS:  Reviewed.  ECOG PS of 2.   The rest of the systems not examined.      LABORATORY DATA:  Reviewed. On 04/26/2020.   -WBC of 81.1 with hemoglobin of 12.7 and platelets of 98.    -CMP essentially normal.      ASSESSMENT:   1.  An 83-year-old gentleman with newly diagnosed chronic myelomonocytic leukemia-1 (CMML-1) and systemic mastocytosis.   2.  Worsening fatigue, decreased appetite, and weight loss secondary to hematological malignancy.   3.  Intermittent flushing of the face from mastocytosis.   4.  Multiple other medical problems including hypertension and coronary artery disease.   5.  History of esophageal cancer.  No evidence of recurrence.      RECOMMENDATIONS:   1.  I had a long discussion with the patient.  His wife was on the phone.  Discussed regarding his symptoms.  I told the patient that his symptoms of fatigue, decreased appetite, and weight loss would all go along with newly diagnosed CMML and systemic mastocytosis.      I explained to the patient that we need to start him on treatment for it.  Without treatment, his symptoms were not improved.  He is agreeable for treatment.      2.  We discussed regarding treatment options.  He has both CMML-1 and systemic mastocytosis.  I would start him on treatment for CMML-1.  Depending on his clinical course, we will decide whether to add treatment for systemic mastocytosis on top of that or not.      I would start him on a hypomethylating agent.  We discussed regarding option of decitabine and azacitidine.   I would favor decitabine as it will be only for 5 days.  Treatment regimen was discussed.  Side effects were reviewed.  The patient agreeable for it.  We will plan on starting it in the next few days.      2.  I  explained to the patient that it might be many weeks before he starts feeling better.  I am hoping he will respond to it.  With treatment, his counts will decrease and we will monitor it closely.      3.  The patient has a history of esophageal cancer.  No evidence of recurrence.  Scans on 2020 does not reveal any evidence of malignancy.      4.  The patient will likely be discharged today.  Okay from Hematology side to be discharged.  Case discussed with Janay Avery, APRN, CNP.      Thanks for the consult.      TOTAL TIME SPENT:  60 minutes, more than 50% of the time spent in counseling and coordination of care.         GRAZYNA RICHEY MD             D: 2020   T: 2020   MT: MARIA A      Name:     RDAHA ALFONSO   MRN:      [id number]        Account:       [id number]   :      1937           Consult Date:  2020      Document: [id number]       No

## 2022-08-07 NOTE — ED PROVIDER NOTE - PATIENT PORTAL LINK FT
You can access the FollowMyHealth Patient Portal offered by [facility] by registering at the following website: [email or web address]. By joining [employer]’s FollowMyHealth portal, you will also be able to view your health information using other applications (apps) compatible with our system.

## 2022-08-09 DIAGNOSIS — T83.9XXA UNSPECIFIED COMPLICATION OF GENITOURINARY PROSTHETIC DEVICE, IMPLANT AND GRAFT, INITIAL ENCOUNTER: ICD-10-CM

## 2022-08-09 DIAGNOSIS — R33.9 RETENTION OF URINE, UNSPECIFIED: ICD-10-CM

## 2022-08-09 DIAGNOSIS — N40.0 BENIGN PROSTATIC HYPERPLASIA WITHOUT LOWER URINARY TRACT SYMPTOMS: ICD-10-CM
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
## 2022-08-09 LAB
-  AMPICILLIN/SULBACTAM: SIGNIFICANT CHANGE UP
-  AMPICILLIN: SIGNIFICANT CHANGE UP
-  AMPICILLIN: SIGNIFICANT CHANGE UP
-  CEFAZOLIN: SIGNIFICANT CHANGE UP
-  CEFEPIME: SIGNIFICANT CHANGE UP
-  CEFTRIAXONE: SIGNIFICANT CHANGE UP
-  CIPROFLOXACIN: SIGNIFICANT CHANGE UP
-  CIPROFLOXACIN: SIGNIFICANT CHANGE UP
-  ERTAPENEM: SIGNIFICANT CHANGE UP
-  GENTAMICIN: SIGNIFICANT CHANGE UP
-  NITROFURANTOIN: SIGNIFICANT CHANGE UP
-  NITROFURANTOIN: SIGNIFICANT CHANGE UP
-  PIPERACILLIN/TAZOBACTAM: SIGNIFICANT CHANGE UP
-  TETRACYCLINE: SIGNIFICANT CHANGE UP
-  TOBRAMYCIN: SIGNIFICANT CHANGE UP
-  TRIMETHOPRIM/SULFAMETHOXAZOLE: SIGNIFICANT CHANGE UP
-  VANCOMYCIN: SIGNIFICANT CHANGE UP
CULTURE RESULTS: SIGNIFICANT CHANGE UP
METHOD TYPE: SIGNIFICANT CHANGE UP
METHOD TYPE: SIGNIFICANT CHANGE UP
ORGANISM # SPEC MICROSCOPIC CNT: SIGNIFICANT CHANGE UP
SPECIMEN SOURCE: SIGNIFICANT CHANGE UP

## 2022-08-09 NOTE — ED ADULT NURSE NOTE - PRO INTERPRETER NEED 2
Called pt to discuss below episode. Pt states he feels great and he does not recall any sx back in June. Will review with Dr. Ruiz and only call pt back with changes. Patient verbalized understanding of information discussed and appreciative of call. Patient will call back with any new or worsening symptoms. No further questions or concerns.     Secure chat message sent to Dr Ruiz to advise.    Updates:   Scheduled remote today shows 1 AT/AF episode 6/2/2022, 5 hours and 38 min, CVR.  Hx of AFL  in 2013 in the setting of NSTEMI, not on OAC.     OAC: NONE    Next OV: 11/10/22    From Last OV: 11/11/21  Primary Care Physician:Ajay Ding MD      CC: MDT dual PPM        Impression:   · Medtronic dual Permanent Pacemaker- placed for SSS and CMP on 8/9/2019.  Normal function. BiV pacing 99.5%.  Infection of site noted 7/2021. New right side device placed, but unable to obtain new LV access for BiV device.   · Junctional rhythm seen on Telemetry tracings 5/3/19 from [facility]). Accelerated junctional rhythm seen during VSE episodes on device check 11/18/2019.   · Second degree AV Block type 1,  seen on Telemetry tracings 5/3/19 from [facility])  · Bifascicular block. Since at laest 2011.  · Sinus node exit block on EKG 6/25/19.   · Hx of Atrial flutter per History in 2013 in the setting of NSTEMI. Notes he converted spontaneously. No tracings for review today  · Nonsustained Ventricular Tachycardia- seen on device.  · Premature Ventricular Contractions on device- often seen during VS episodes  · CHADS-VACS= 5 (Age, LV dysfunction, CAD, HTN)   · Chronic systolic heart failure: LVEF 30% and Moderately decreased right ventricular systolic function per Echo 3/21/18 previously 38% per Echo 10/28/13. GDMT: Has been on Lisinopril for years. Metoprolol discontinued  5/2019 for bradycardia.    Ref. Range 10/28/2013 15:05 3/21/2018 15:41   Ejection Fraction Latest Units: % 38 30.0      · Multivessel  CAD per Coronary cath 3/22/18: LAD 60% lesion, small circumflex in distal portion has 50% stenosis.  Mid posterolateral branch is totally occluded as it was in 2013.  Moderately reduced left ventricular systolic function.  Cardiomyopathy seems to be out of proportion to coronary artery disease. Treating medically. Following with Dr. Zambrano     Chronic Issues/Considerations:  · Hx of Possible recurrent syncope vs episodes of unresponsiveness : Last episode around 2000. Notes he did seek care, but cause was never determined.  · TSH 0.895 10/14/2020  · Depression  · HTN  · Hx of Suicidal ideation 5/3/19  · ETOH use     Plan:   · Continue device settings  · Follow up every 3 months remotely (minimum 91 days between remotes) and yearly in the office.      Date: Procedure:   7/16/21 Successful dual-chamber pacemaker implant via right subclavian venous approach using a nonthoracotomy lead system.   8/9/2019 Successful implantation of a MDT BiV pacemaker for symptomatic bradycardia, cardiomyopathy, SSS with exit block, AVB (explanted 7/13/2021 due to site infection       BP Readings from Last 4 Encounters:   02/25/22 126/80   11/11/21 116/78   08/30/21 (!) 166/90   08/26/21 (!) 163/96     Pulse Readings from Last 4 Encounters:   02/25/22 84   11/11/21 85   08/30/21 72   08/26/21 85     Creat:  Creatinine (mg/dL)   Date Value   02/25/2022 1.08    Last INRs:  INR (no units)   Date Value   07/13/2021 1.0     INR-POC (no units)   Date Value   11/18/2013 2.7   11/07/2013 2.0   11/05/2013 1.5        Ejection Fraction   Date Value Ref Range Status   03/21/2018 30.0 % Corrected         Current Outpatient Medications   Medication Sig   • allopurinol (ZYLOPRIM) 300 MG tablet Take 1 tablet by mouth daily.   • indapamide (LOZOL) 2.5 MG tablet Take 1 tablet by mouth every morning.   • lisinopril (ZESTRIL) 40 MG tablet Take 1 tablet by mouth daily.   • acetaminophen (TYLENOL) 325 MG tablet Take 2 tablets by mouth every 4 hours as needed  for Pain.   • LOW-DOSE ASPIRIN PO Take 81 mg by mouth daily.      No current facility-administered medications for this visit.            English

## 2022-08-16 NOTE — ED ADULT TRIAGE NOTE - PRO INTERPRETER NEED 2
[facility] MEDICINE PROGRESS NOTE   Patient: Homer Michelle  Today's Date: 8/16/2022    YOB: 1941  Admission Date: 8/8/2022    MRN: [id number]  Inpatient LOS: 0    Room: [address]  Hospital Day: Hospital Day: 9    Subjective   HISTORY AND SUBJECTIVE COMPLAINTS     Chief Complaint:   Back pain    Interval History / Subjective:   NAEO.  Pain is stable.  Going for biopsy.  Patient denies fevers, chills, vision changes, CP, cough, wheezing, SOB, abdominal pain, NVD.    Hospital Course:  80 yo woman who presented with worsening back pain and generalized weakness.  CT abdomen/pelvis 8/8 revealed a destructive sacral soft tissue mass as well as a large pelvic mass. MRI pelvis 8/9 showed a 16.5 cm uterine mass with imaging findings most compatible with large degenerated myometrial leiomyoma,  as well as the 10 cm lytic and expansile solid sacral mass, with a leading diagnosis of chordoma. The latter was biopsied and preliminary pathology shows:     Spindle cell proliferation, pending immunohistochemical work-up and interdepartmental consult    Neurosurgery, Onc, Rad Onc, Gyn onc all following.    ROS:  Pertinent systems negative except as above.    Objective   PHYSICAL EXAMINATION     Vital 24 Hour Range Most Recent Value   Temperature Temp  Min: 97.5 °F (36.4 °C)  Max: 98.2 °F (36.8 °C) 97.7 °F (36.5 °C)   Pulse Pulse  Min: 59  Max: 104 (!) 59   Respiratory Resp  Min: 10  Max: 18 17   Blood Pressure BP  Min: 126/78  Max: 164/88 137/69   Pulse Oximetry SpO2  Min: 94 %  Max: 100 % 98 %     Recorded Intake and Output:    Intake/Output Summary (Last 24 hours) at 8/16/2022 1129  Last data filed at 8/15/2022 2016  Gross per 24 hour   Intake 200 ml   Output --   Net 200 ml      Recorded Last Stool Occurrence: 1 (inc) (08/16/22 0852)     Vital Most Recent Value First Value   Weight 53.2 kg (117 lb 4.6 oz) Weight: 46 kg (101 lb 6.6 oz)   Height 5' 7\" (170.2 cm) Height: 5' 7\" (170.2 cm)   BMI  18.37 N/A     General:  Alert, cooperative, conversive. No acute distress.  Skin:  Warm and dry without rash.    Head:  Normocephalic-atraumatic.   Neck:  Trachea is midline.     Eyes:  Normal conjunctivae and sclerae. PERRL  ENT:  Oral Mucous membranes are moist.   Cardiovascular:  Symmetrical radial pulses.  Regular rate and regular rhythm without murmur. No lower extremity edema  Respiratory:   Normal respiratory effort.  Clear to auscultation.  No wheezes, rales or rhonchi.  Gastrointestinal:  Soft and nontender.  Normal bowel sounds.  No hepatomegaly or splenomegaly.   Musculoskeletal:  No deformity or swelling in joints.  Neurologic:  Oriented times 4. Cranial nerves intact. No neuro focal deficits.  Psychiatric:  Cooperative.  Appropriate mood and affect.      TEST RESULTS     Recent Labs   Lab 08/16/22  0608 08/15/22  0629 08/14/22  1051   WBC 6.2 6.0 7.3   HCT 31.2* 30.7* 35.5*   HGB 9.9* 9.9* 11.5*    299 352     Recent Labs   Lab 08/16/22  0608 08/15/22  0629 08/14/22  1051   SODIUM 141 141 141   POTASSIUM 3.9 3.9 4.0   CHLORIDE 109 111* 111*   CO2 28 25 24   BUN 21* 18 15   CREATININE 0.52 0.57 0.47*   GLUCOSE 99 108* 100*   CALCIUM 9.0 8.9 8.8     Recent Labs   Lab 08/11/22  0914 08/10/22  1546   ALBUMIN 2.5* 2.6*   BILIRUBIN 0.5 0.4   ALKPT 68 76   AST 16 16   GPT 13 12     No results found  No results found    Invalid input(s): TROPONIN  No results found    Radiology: Imaging studies have been reviewed and pertinent findings discussed in the Assessment and Plan.  Results for orders placed or performed during the hospital encounter of 08/08/22 (from the past 48 hour(s))   CT CHEST W CONTRAST    Impression    IMPRESSION:  Compared to the September 9, 2021 CT, there are numerous new nodular  opacities measuring up to 10 mm. Infection is favored over neoplasm.  Follow-up noncontrast chest CT in 3 months recommended for surveillance.    Left pleural fluid.    At least moderate hiatal hernia  containing ingested material. Patient may  be at increased risk for aspiration pneumonia.   CT Guided Needle Placement    Impression    IMPRESSION:   CT-guided anterior pelvic mass biopsy.     PLAN:  Routine monitoring. Follow-up pathology. 1 hour bedrest.            ANCILLARY ORDERS     Diet:  Daily W Lunch; Ensure Enlive/standard Oral Supplement, Chocolate Oral Nutrition Supplement  Daily W Dinner; Ensure Enlive/standard Oral Supplement, Vanilla Oral Nutrition Supplement  Npo Diet With Exceptions; Medications  Telemetry: Off  Consults:    IP CONSULT TO SOCIAL WORK  IP CONSULT TO GYNECOLOGIC ONCOLOGY  IP CONSULT TO NEUROSURGERY  IP CONSULT TO SOCIAL WORK  IP CONSULT TO RADIATION ONCOLOGY  IP CONSULT TO ONCOLOGY  IP CONSULT TO ONCOLOGY  Therapy Orders:   PT and OT Orders Placed this Encounter   Procedures   • Occupational Therapy   • Physical Therapy       Advance Care Planning      Medications  Scheduled:  gabapentin, 200 mg, TID  morphine injection, 1 mg, Once  enoxaparin, 30 mg, Q24H  sodium chloride (PF), 10 mL, 2 times per day  insulin lispro, , TID WC  budesonide-formoterol, 2 puff, BID  Potassium Standard Replacement Protocol (Levels 3.5 and lower), , See Admin Instructions  Magnesium Standard Replacement Protocol, , See Admin Instructions  sodium chloride (PF), 10 mL, Once  sodium chloride (PF), 2 mL, 2 times per day         PRN:  sodium chloride (PF), 10 mL, PRN  sodium chloride (PF), 20 mL, PRN  dextrose, 25 g, PRN  dextrose, 12.5 g, PRN  glucagon, 1 mg, PRN  dextrose, 15 g, PRN  dextrose, 30 g, PRN  albuterol, 2 puff, Q4H PRN  traMADol, 50 mg, Q8H PRN  sodium chloride, 500 mL, PRN  sodium chloride, 25 mL, PRN  ondansetron, 4 mg, BID PRN  bisacodyl, 10 mg, Daily PRN  lactulose, 200 g, Q4H PRN  sodium chloride, 25 mL, PRN          ADVANCED DIRECTIVES     Code Status: Full Resuscitation          ASSESSMENT AND PLAN     1. 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  Discussed with neurosurgery may need to transfer to another center.  · Appreciate recommendations from radiation oncology and Gynecology/Oncology.   · MRI imaging complete and reviewed.  Await final pathology, will likely need to be transferred to another center if pursuing surgery.   · Consult the palliative care prior to any further planning once final pathology is available  · Started gabapentin with improvement in symptoms. Will increase dose   2. Pelvic mass:   · Gynecology/Oncology on consult, possible surgical intervention near future.  · Getting CT guided biopsy today  3. History of MAC: No acute issues.   4. Moderate persistent Asthma  5. Dehydration:  Likely hemoconcentration, continue IV fluid  6. Thrombocytosis  7. Acute kidney injury:  Resolved with IV fluid hydration, continue gentle hydration  8. History of Graves disease: Currently in remission   9. Severe protein calorie malnutrition present on admission  10. Hypercalcemia:  Resolved with IV fluids, secondary to severe dehydration  11. Delirium:  Much improved, patient's POA is her sister Breana      Smoking status: Reviewed in Epic  Nutrition status: Reviewed in Epic  Body mass index is 18.37 kg/m².  DVT Prophylaxis:   Current Active Medications for DVT Prophylaxis (From admission, onward)         Stop     enoxaparin (LOVENOX) injection 30 mg  30 mg,   Subcutaneous,   EVERY 24 HOURS         --                     DISCHARGE PLANNING        Recommendations for Discharge   SW     PT Post-acute facility with therapy needs   OT Post-acute facility with therapy needs   SLP        Anticipated discharge destination: TBD  Expected Discharge Date: 8/21/22  Barriers to Discharge: Path report, surgical plan, possible transfer        Reza Dave MD  Hospitalist  8/16/2022  11:29 AM      English

## 2022-09-21 NOTE — ED ADULT NURSE NOTE - NSFALLRSKASSESSDT_ED_ALL_ED
Patient calling.  Discussed below.  Scheduled appt with Fallon 10/7/22.  Dr. Timmons declined refills.  Allison- are you willing to sign short term supply to get him by til visit?  Natasha Centeno RN     08-Jun-2022 16:48

## 2022-11-04 ENCOUNTER — APPOINTMENT (OUTPATIENT)
Dept: UROLOGY | Facility: CLINIC | Age: 57
End: 2022-11-04

## 2022-11-14 NOTE — ED ADULT NURSE NOTE - CHPI ED NUR DURATION
Problem: Adult Inpatient Plan of Care  Goal: Plan of Care Review  Outcome: Ongoing, Progressing      today

## 2022-11-25 ENCOUNTER — NON-APPOINTMENT (OUTPATIENT)
Age: 57
End: 2022-11-25

## 2022-11-27 ENCOUNTER — EMERGENCY (EMERGENCY)
Facility: HOSPITAL | Age: 57
LOS: 1 days | Discharge: HOME | End: 2022-11-27
Admitting: EMERGENCY MEDICINE
Payer: MEDICAID

## 2022-11-27 VITALS
SYSTOLIC BLOOD PRESSURE: 150 MMHG | DIASTOLIC BLOOD PRESSURE: 72 MMHG | RESPIRATION RATE: 18 BRPM | TEMPERATURE: 98 F | OXYGEN SATURATION: 96 % | HEART RATE: 86 BPM

## 2022-11-27 VITALS
RESPIRATION RATE: 18 BRPM | DIASTOLIC BLOOD PRESSURE: 95 MMHG | TEMPERATURE: 98 F | OXYGEN SATURATION: 95 % | HEART RATE: 122 BPM | SYSTOLIC BLOOD PRESSURE: 131 MMHG

## 2022-11-27 DIAGNOSIS — Z20.822 CONTACT WITH AND (SUSPECTED) EXPOSURE TO COVID-19: ICD-10-CM

## 2022-11-27 DIAGNOSIS — N40.0 BENIGN PROSTATIC HYPERPLASIA WITHOUT LOWER URINARY TRACT SYMPTOMS: ICD-10-CM

## 2022-11-27 DIAGNOSIS — N39.0 URINARY TRACT INFECTION, SITE NOT SPECIFIED: ICD-10-CM

## 2022-11-27 DIAGNOSIS — R33.9 RETENTION OF URINE, UNSPECIFIED: ICD-10-CM

## 2022-11-27 LAB
APPEARANCE UR: CLEAR — SIGNIFICANT CHANGE UP
BACTERIA # UR AUTO: ABNORMAL /HPF
BILIRUB UR-MCNC: NEGATIVE — SIGNIFICANT CHANGE UP
COLOR SPEC: YELLOW — SIGNIFICANT CHANGE UP
DIFF PNL FLD: ABNORMAL
EPI CELLS # UR: SIGNIFICANT CHANGE UP /HPF (ref 0–5)
GLUCOSE UR QL: NEGATIVE — SIGNIFICANT CHANGE UP
KETONES UR-MCNC: NEGATIVE — SIGNIFICANT CHANGE UP
LEUKOCYTE ESTERASE UR-ACNC: ABNORMAL
NITRITE UR-MCNC: POSITIVE
PH UR: 6 — SIGNIFICANT CHANGE UP (ref 5–8)
PROT UR-MCNC: 100 MG/DL
RBC CASTS # UR COMP ASSIST: ABNORMAL /HPF
SARS-COV-2 RNA SPEC QL NAA+PROBE: NEGATIVE — SIGNIFICANT CHANGE UP
SP GR SPEC: >=1.03 — SIGNIFICANT CHANGE UP (ref 1–1.03)
UROBILINOGEN FLD QL: 0.2 E.U./DL — SIGNIFICANT CHANGE UP
WBC UR QL: ABNORMAL /HPF

## 2022-11-27 PROCEDURE — 99283 EMERGENCY DEPT VISIT LOW MDM: CPT

## 2022-11-27 PROCEDURE — 51702 INSERT TEMP BLADDER CATH: CPT

## 2022-11-27 PROCEDURE — 81001 URINALYSIS AUTO W/SCOPE: CPT

## 2022-11-27 PROCEDURE — 87635 SARS-COV-2 COVID-19 AMP PRB: CPT

## 2022-11-27 PROCEDURE — 99284 EMERGENCY DEPT VISIT MOD MDM: CPT

## 2022-11-27 PROCEDURE — 87086 URINE CULTURE/COLONY COUNT: CPT

## 2022-11-27 RX ORDER — SODIUM CHLORIDE 9 MG/ML
1000 INJECTION INTRAMUSCULAR; INTRAVENOUS; SUBCUTANEOUS ONCE
Refills: 0 | Status: COMPLETED | OUTPATIENT
Start: 2022-11-27 | End: 2022-11-27

## 2022-11-27 RX ADMIN — Medication 1 TABLET(S): at 09:01

## 2022-11-27 RX ADMIN — SODIUM CHLORIDE 1000 MILLILITER(S): 9 INJECTION INTRAMUSCULAR; INTRAVENOUS; SUBCUTANEOUS at 07:57

## 2022-11-27 NOTE — ED PROVIDER NOTE - OBJECTIVE STATEMENT
57 y/o M, PMHx of urinary retention w/ indwelling cath and BPH, reports urinary retention for 7 yrs, now presenting to ED requesting cath change due to cath malfunction with connection of leg bag and tubing. Pt admits to last cath change was in Aug. 2022. Pt has scheduled appt. on Tuesday w/ Dr. khan. pt denies any fevers, abdominal pain, n/v/d, hematuria, and difficulty urinating. Pt, however does reports in his last visit, had a UTI. But he never went to pharmacy to get Rx meds. 55 y/o M, PMHx of urinary retention w/ indwelling cath and BPH, reports of h/o urinary retention for 7 yrs. Patient now is presenting to ED requesting cath change due to cath malfunction with connection of leg bag and tubing. Pt admits to last cath change was in Aug. 2022. Pt has scheduled appt. on Tuesday w/ Dr. Beauchamp. pt denies any fevers, abdominal pain, n/v/d, hematuria, and difficulty urinating. Pt, however does reports in his last visit, had a UTI. But he never went to pharmacy to get Rx meds.

## 2022-11-27 NOTE — ED PROVIDER NOTE - CLINICAL SUMMARY MEDICAL DECISION MAKING FREE TEXT BOX
Patient with chronic urinary retention. Catheter was changed and abx was prescribed due to + UA. Abx selection was based on prior urine culture.

## 2022-11-27 NOTE — ED PROVIDER NOTE - PHYSICAL EXAMINATION
CONSTITUTIONAL: Well-developed; well-nourished; in no acute distress.  SKIN: Warm and dry, no acute rash.  HEAD: Normocephalic; atraumatic.  EYES: PERRL, EOM intact; conjunctiva and sclera clear.  ENT: No nasal discharge; airway clear.  NECK: Supple; non tender.  CARD: S1, S2 normal; no murmurs, gallops, or rubs. Regular rate and rhythm.  RESP: No wheezes, rales or rhonchi.  ABD: Normal bowel sounds; soft; non-distended; non-tender; no hepatosplenomegaly.  : No CVAT. Cath in place. urethral meatus intact. No skin breakdown.   EXT: Normal ROM. No clubbing, cyanosis or edema.  NEURO: Alert, oriented. Grossly unremarkable.  PSYCH: Cooperative, appropriate. CONSTITUTIONAL: Well-developed; well-nourished; in no acute distress.  SKIN: Warm and dry, no acute rash.  HEAD: Normocephalic; atraumatic.  EYES: PERRL, EOM intact; conjunctiva and sclera clear.  ENT: No nasal discharge; airway clear.  NECK: Supple; non tender.  CARD: S1, S2 normal; no murmurs, gallops, or rubs. Regular rate and rhythm.  RESP: No wheezes, rales or rhonchi.  ABD: Normal bowel sounds; soft; non-distended; non-tender;   : No CVAT. Cath in place. urethral meatus intact. No skin breakdown.   EXT: Normal ROM. No clubbing, cyanosis or edema.  NEURO: Alert, oriented. Grossly unremarkable.  PSYCH: Cooperative, appropriate.

## 2022-11-27 NOTE — ED PROVIDER NOTE - PATIENT PORTAL LINK FT
You can access the FollowMyHealth Patient Portal offered by [facility] by registering at the following website: [email or web address]. By joining [employer]’s FollowMyHealth portal, you will also be able to view your health information using other applications (apps) compatible with our system.

## 2022-11-27 NOTE — ED ADULT NURSE NOTE - OBJECTIVE STATEMENT
Pt presents to ED stating he has an appointment to get catheter replaced on Tuesday but he knows his valdez is broken and needs a new one. Pt states "I also know I have an infection d/t odor." Denies discharge, dysuria, hematuria.

## 2022-11-27 NOTE — ED PROVIDER NOTE - NSICDXPASTMEDICALHX_GEN_ALL_CORE_FT
PAST MEDICAL HISTORY:  Benign prostatic hyperplasia, presence of lower urinary tract symptoms unspecified, unspecified morphology      PAST MEDICAL HISTORY:  Benign prostatic hyperplasia, presence of lower urinary tract symptoms unspecified, unspecified morphology       Urinary retention

## 2022-11-27 NOTE — ED PROVIDER NOTE - NS ED ROS FT
General: no fever, chills, confusion  Cardiac: no chest pain, chest tightness, palpitations  Lungs: no sob, difficulty breathing  Abdomen: no abdominal pain, nausea, vomiting, diarrhea, constipation, nml BM  : no dysuria, urinary frequency/urgency. No hematuria. (+) cath malfunction. No difficulty urinating.     All other systems negative except as per HPI

## 2022-11-27 NOTE — ED ADULT NURSE NOTE - IN THE PAST 12 MONTHS HAVE YOU USED DRUGS OTHER THAN THOSE REQUIRED FOR MEDICAL REASON?
General Sunscreen Counseling: I recommended a broad spectrum sunscreen with a SPF of 30 or higher.  I explained that SPF 30 sunscreens block approximately 97 percent of the sun's harmful rays.  Sunscreens should be applied at least 15 minutes prior to expected sun exposure and then every 2 hours after that as long as sun exposure continues. If swimming or exercising sunscreen should be reapplied every 45 minutes to an hour after getting wet or sweating.  I also recommended a lip balm with a sunscreen as well. Sun protective clothing can be used in lieu of sunscreen but must be worn the entire time you are exposed to the sun's rays. Detail Level: Detailed No

## 2022-11-28 LAB
CULTURE RESULTS: SIGNIFICANT CHANGE UP
SPECIMEN SOURCE: SIGNIFICANT CHANGE UP

## 2022-11-28 NOTE — ED ADULT NURSE NOTE - CHIEF COMPLAINT
Please contact the patient, inform her of the resolved and ask if she is having symptoms that require her to go to emergency room verses being able to give several extra doses of potassium at home  The patient is a 55y Male complaining of

## 2022-11-29 ENCOUNTER — NON-APPOINTMENT (OUTPATIENT)
Age: 57
End: 2022-11-29

## 2022-11-29 ENCOUNTER — APPOINTMENT (OUTPATIENT)
Dept: UROLOGY | Facility: CLINIC | Age: 57
End: 2022-11-29

## 2022-11-29 VITALS
BODY MASS INDEX: 24.38 KG/M2 | TEMPERATURE: 98 F | WEIGHT: 180 LBS | DIASTOLIC BLOOD PRESSURE: 93 MMHG | SYSTOLIC BLOOD PRESSURE: 146 MMHG | HEIGHT: 72 IN | HEART RATE: 85 BPM

## 2022-11-29 DIAGNOSIS — N40.1 BENIGN PROSTATIC HYPERPLASIA WITH LOWER URINARY TRACT SYMPMS: ICD-10-CM

## 2022-11-29 DIAGNOSIS — N13.8 BENIGN PROSTATIC HYPERPLASIA WITH LOWER URINARY TRACT SYMPMS: ICD-10-CM

## 2022-11-29 DIAGNOSIS — R68.89 OTHER GENERAL SYMPTOMS AND SIGNS: ICD-10-CM

## 2022-11-29 PROCEDURE — 99204 OFFICE O/P NEW MOD 45 MIN: CPT

## 2022-11-29 RX ORDER — TAMSULOSIN HYDROCHLORIDE 0.4 MG/1
0.4 CAPSULE ORAL
Qty: 90 | Refills: 3 | Status: ACTIVE | COMMUNITY
Start: 2022-11-29 | End: 1900-01-01

## 2022-11-29 NOTE — ASSESSMENT
[FreeTextEntry1] : BPH with urinary retention \par Indwelling valdez catheter for 2 years \par Discussed etiology and treatment options, TURP, Rezum, vs Urolift\par Interest in Rezum \par restart Tamsulosin 0.4 mg - reviewed risks, benefits and s/e\par Abnormal GOLDEN - right induration\par Will send for MRI prostate \par Labs today: UA, UC , PSA\par \par follow up cystoscopy

## 2022-11-29 NOTE — PHYSICAL EXAM
[General Appearance - Well Developed] : well developed [General Appearance - Well Nourished] : well nourished [Normal Appearance] : normal appearance [Well Groomed] : well groomed [General Appearance - In No Acute Distress] : no acute distress [Edema] : no peripheral edema [Respiration, Rhythm And Depth] : normal respiratory rhythm and effort [Exaggerated Use Of Accessory Muscles For Inspiration] : no accessory muscle use [Abdomen Soft] : soft [Abdomen Tenderness] : non-tender [Costovertebral Angle Tenderness] : no ~M costovertebral angle tenderness [Urethral Meatus] : meatus normal [Penis Abnormality] : normal circumcised penis [Urinary Bladder Findings] : the bladder was normal on palpation [Scrotum] : the scrotum was normal [Testes Mass (___cm)] : there were no testicular masses [No Prostate Nodules] : no prostate nodules [Normal Station and Gait] : the gait and station were normal for the patient's age [] : no rash [No Focal Deficits] : no focal deficits [Oriented To Time, Place, And Person] : oriented to person, place, and time [Affect] : the affect was normal [Mood] : the mood was normal [Not Anxious] : not anxious [No Palpable Adenopathy] : no palpable adenopathy [FreeTextEntry1] : Prostate Right lobe induration, bilateral 20 cc testis

## 2022-11-29 NOTE — HISTORY OF PRESENT ILLNESS
[Currently Experiencing ___] :  [unfilled] [Urinary Retention] : urinary retention [None] : None [FreeTextEntry1] : 56 yyr old male  with h/o urinary retention w/ indwelling cath and BPH, recurrent UTI \par Presents today to establish care\par Recently seen in ER for valdez catheter leak 11/27/2022\par Valdez catheter since 2015 due to urinary retention \par Did not see any urologist for prostate check \par reports  Tamsulosin 0.4 mg was prescribed but never started\par No PSA level \par Denies fever or chills \par \par Social hx: denies smoking or alcohol \par FHX: denies cancer hx

## 2022-11-30 ENCOUNTER — NON-APPOINTMENT (OUTPATIENT)
Age: 57
End: 2022-11-30

## 2022-11-30 LAB
APPEARANCE: CLEAR
BACTERIA: ABNORMAL
BILIRUBIN URINE: NEGATIVE
BLOOD URINE: ABNORMAL
COLOR: YELLOW
GLUCOSE QUALITATIVE U: NEGATIVE
HYALINE CASTS: 4 /LPF
KETONES URINE: NEGATIVE
LEUKOCYTE ESTERASE URINE: ABNORMAL
MICROSCOPIC-UA: NORMAL
NITRITE URINE: NEGATIVE
PH URINE: 6
PROTEIN URINE: ABNORMAL
PSA FREE FLD-MCNC: 20 %
PSA FREE SERPL-MCNC: 7.34 NG/ML
PSA SERPL-MCNC: 36.4 NG/ML
RED BLOOD CELLS URINE: 14 /HPF
SPECIFIC GRAVITY URINE: 1.02
SQUAMOUS EPITHELIAL CELLS: 1 /HPF
UROBILINOGEN URINE: NORMAL
WHITE BLOOD CELLS URINE: 12 /HPF

## 2022-12-01 ENCOUNTER — NON-APPOINTMENT (OUTPATIENT)
Age: 57
End: 2022-12-01

## 2022-12-02 ENCOUNTER — NON-APPOINTMENT (OUTPATIENT)
Age: 57
End: 2022-12-02

## 2022-12-02 DIAGNOSIS — N39.0 URINARY TRACT INFECTION, SITE NOT SPECIFIED: ICD-10-CM

## 2022-12-02 LAB — BACTERIA UR CULT: ABNORMAL

## 2022-12-02 RX ORDER — SULFAMETHOXAZOLE AND TRIMETHOPRIM 800; 160 MG/1; MG/1
800-160 TABLET ORAL
Qty: 14 | Refills: 0 | Status: DISCONTINUED | COMMUNITY
Start: 2022-12-01 | End: 2022-12-02

## 2022-12-02 RX ORDER — CIPROFLOXACIN HYDROCHLORIDE 500 MG/1
500 TABLET, FILM COATED ORAL
Qty: 14 | Refills: 0 | Status: ACTIVE | COMMUNITY
Start: 2022-12-02 | End: 1900-01-01

## 2022-12-08 PROBLEM — Z00.00 ENCOUNTER FOR PREVENTIVE HEALTH EXAMINATION: Noted: 2022-12-08

## 2022-12-08 NOTE — ED ADULT NURSE NOTE - HARM RISK FACTORS
How Severe Is Your Skin Lesion?: moderate
Have Your Skin Lesions Been Treated?: not been treated
Is This A New Presentation, Or A Follow-Up?: Growth
Additional History: New patient
no

## 2022-12-09 PROBLEM — R33.9 RETENTION OF URINE, UNSPECIFIED: Chronic | Status: ACTIVE | Noted: 2022-11-29

## 2022-12-12 ENCOUNTER — RESULT REVIEW (OUTPATIENT)
Age: 57
End: 2022-12-12

## 2022-12-12 ENCOUNTER — APPOINTMENT (OUTPATIENT)
Dept: MRI IMAGING | Facility: HOSPITAL | Age: 57
End: 2022-12-12

## 2022-12-12 ENCOUNTER — OUTPATIENT (OUTPATIENT)
Dept: OUTPATIENT SERVICES | Facility: HOSPITAL | Age: 57
LOS: 1 days | End: 2022-12-12
Payer: MEDICAID

## 2022-12-12 PROCEDURE — 72197 MRI PELVIS W/O & W/DYE: CPT

## 2022-12-12 PROCEDURE — A9585: CPT

## 2022-12-12 PROCEDURE — 72197 MRI PELVIS W/O & W/DYE: CPT | Mod: 26

## 2022-12-30 ENCOUNTER — APPOINTMENT (OUTPATIENT)
Dept: UROLOGY | Facility: CLINIC | Age: 57
End: 2022-12-30

## 2023-01-05 ENCOUNTER — APPOINTMENT (OUTPATIENT)
Dept: UROLOGY | Facility: CLINIC | Age: 58
End: 2023-01-05

## 2023-02-09 NOTE — ED ADULT NURSE NOTE - CHIEF COMPLAINT
Speech Language Pathology  ACUTE REHAB UNIT  SPEECH/LANGUAGE PATHOLOGY      [x] Daily  [x] Weekly Care Conference Note  [] Discharge    Patient:Jason Maravilla  EST:[id number]  Rehab Dx/Hx: Acute CVA (cerebrovascular accident) ([facility] [address] [I63.9]    Precautions: [x] Aspiration  [x] Fall risk  [] Sternal  [] Seizure [] Hip  [] Weight Bearing [] Other     ST Dx: [] Aphasia  [x] Dysarthria  [] Apraxia   [x] Oropharyngeal dysphagia [x] Cognitive Impairment  [] Other:   Date of Admit: 1/29/2023  Room #: 3102/3102-01  Date: 2/9/2023          Current Diet Order:ADULT DIET; Dysphagia - Minced and Moist  ADULT ORAL NUTRITION SUPPLEMENT; Breakfast, Dinner; Standard High Calorie/High Protein Oral Supplement   Recommended Form of Meds: Meds in puree  Compensatory Swallowing Strategies :  (Small bites, no pharyngeal neck extension, left head turn with thins, check for pocketing on left, 1:1 assist for small bolus size)   Previous MBS: 1/26/23  Oral Phase  Mild-moderate dysfunction characterized by incomplete labial seal with anterior spillage, slowed/reduced mastication, mild stasis. Pharyngeal Phase  Moderate dysfunction characterized by impairments in timing, strength and coordination with premature bolus loss, reduced base of tongue retraction, delayed/reduced hyolaryngeal mechanics, and reduced pharyngeal constriction. Resulted in vallecular/pyriform/pharyngeal residue, as well as compromised airway protection with penetration and gross tracheal aspiration of thin and mildly thick liquids; strong reactive cough present but did not fully clear. Chin tuck strategy did not eliminate aspiration, however cued head turn LEFT with small straw sips was effective . Double swallow helped clear residual. Of note, throughout study pt with tendency to tilt head posteriorly which further exacerbated bolus control; benefited from cues for neutral positioning.   Upper Esophageal Screen  Indirectly assessed; appeared unremarkable. 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, music, [employer]    Dentition: Adequate  Vision  Vision:  (not wearing glasses due to nose injury)  Vision Exceptions: Wears glasses at all times  Hearing  Hearing: Within functional limits  Barriers toward progress: Severity of impairments      Date: 2/9/2023       Tx session 1 Tx session 2 Weekly Summary   Total Timed Code Min 25 25    Total Treatment Minutes 45 40    Individual Treatment Minutes 45 40    Group Treatment Minutes 0 0    Co-Treat Minutes 0 0    Brief Exception: N/A N/a    Pain None indicated None indicated    Pain Intervention: [] RN notified  [] Repositioned  [] Intervention offered and patient declined  [x] N/A  [] Other: [] RN notified  [] Repositioned  [] Intervention offered and patient declined  [x] N/A  [] Other:    Subjective:     Pt seated upright in wheelchair finishing with OT/PT, agreeable to session. Wife present. Pt being assisted by PCA and RN to get from bathroom into wheelchair after shower. Pt agreeable to session. Spouse present. Objective / Goals:      Goal 1: Pt will demonstrate adequate oral containment of liquids and solids across meal without cueing. X4 small instances of min residue loss on the L. Labial seals: x 7    Labial seals against resistance: x 5 PROGRESSING; CONTINUE   Goal 2: Pt will tolerate trials of solids with adequate mastication and propulsion as evidenced by min residue after the swallow across mealtime assessment. Pt endorsed min residue remaining along L side buccal cavity x2 -utilized lingual sweep     X5 cues provided for use of finger sweep when min residue noted along anterior portion of dentition/buccal cavity on the L.     Increased residue when pt continually talking due to reduced attention. Benefited from cues to redirect and identify oral residue. Not targeted this session. PROGRESSING; CONTINUE   Goal 3: Pt will tolerate thin liquids with head in neutral position without s/s associated with aspiration. All trials of thin liquids this date with head in neutral position. No s/s noted. Not targeted this session. PROGRESSING; CONTINUE   Goal 4: Pt will demonstrate safe feeding behavior as evidenced by small bolus size / appropriate rate without cues. Pt independently took small bolus size bites and demonstrated appropriate rate this date. Not targeted this session. PROGRESSING; CONTINUE   Goal 5: Pt will tolerate least restrictive diet without respiratory decline. Effortful swallows: x53    No noted s/s of aspiration observed this date. WBC and Neutrophil Absolute are WFL. CTAR: x3 sets x15  Initial and final sets noted to be stronger compared to middle set. PROGRESSING; CONTINUE   Goal 1: The patient will maintain eye contact to speaker positioned left of midline without distractors, min cues. Continued improved eye contact with speaker at midline, as well as with speakers on the L. Exclusion Task: Provided with initial cue to scan and locate all numbers located on the L, however continued difficulty keeping track. SLP angelito high lighter line on L edge of page to assist with identifying L side/start of information. Completed with 80% accuracy given moderate cues. In leisure activity, required x2 cues to scan to the left, but overall demonstrated adequate left side scanning. PROGRESSING; CONTINUE   Goal 2: The patient will demonstrate sustained attention to task for 2 minutes with <2 prompts. Redirection provided x4    Wife assisting with redirections. Exclusion Task: Targeted in L side attention task: completed with only x1 redirection noted.     Pt demonstrated difficulty with working memory/attention during exclusion task and required cues to identify correct word to cancel out. Pt benefited from repetition of information to recall placement as well as binary choice to determine correct answer. Therapeutic leisure activity: Pt able to sustain attention to task for 10 minutes. PROGRESSING; CONTINUE   Goal 3: The patient will demonstrate improved inhibition as evidenced by <3 prompts for impulsivity during a task. No instances of impulsivity noted this session. Impulsive x2, however when provided with prompt able to self correct. PROGRESSING; CONTINUE   Goal 4: The patient will demonstrate improved self monitoring/correcting for basic tasks with <mod cues. Continued improved monitoring of swallow strategies to use for oral clearance and placement of PO on right side of oral cavity. Pt required prompts to self correct error x2 during exclusion task. PROGRESSING; CONTINUE   Goal 5: The patient will tolerate ongoing cognitive linguistic assessment. Not targeted this session. Not targeted this session. PROGRESSING; CONTINUE   Goal 6: The patient will demonstrate comprehensibility in multiple communication environments without cues. No instances of reduced comprehensibility. Pt entirely comprehensible throughout session this date. PROGRESSING; CONTINUE   Other areas targeted:      Education:   Education ongoing re: Rationale for swallowing strategies utilized during AM meal. Discussed cognitive therapeutic tasks to improve pts difficulty with attention. Educated re: Cognitive therapeutic tasks targeting improved sustained attention and left side visual scanning. SLP discussed therapeutic swallowing strategies during session.      Safety Devices: [x] Call light within reach  [x] Chair alarm activated and connected to nurse call light system  [] Bed alarm activated   [] Other:  [x] Call light within reach  [x] Chair alarm activated and connected to nurse call light system  [] Bed alarm activated  [] Other:     Assessment: Pt continues to demonstrate improvement and MI utilizing therapeutic swallowing techniques. Improved left side visual scanning during leisure activity; still demonstrates difficulty with reading words and numbers located in left visual field. Biggest barriers continue to be reduced attention with tangential comments across all tasks. Plan: Continue as per plan of care. Interventions used this date:  [] Speech/Language Treatment  [] Instruction in HEP  [x] Dysphagia Treatment [x] Cognitive Treatment   [] Other:    Discharge recommendations:  [] Home independently  [x] Home with assistance []  24 hour supervision  [] ECF [] Other  Continued Tx Upon Discharge: ? [x] Yes    [] No    [] TBD based on progress while on ARU     [] Vital Stim indicated     [] Other:   Estimated discharge date: February 19th, 2023    Electronically signed by:  Joshua Geller, [address] [place] [address] Language Pathology      Maria Eugenia Purcell M.A., [address] [place] [address]  Speech-Language Pathologist    The speech-language pathologist was present, directed the patient's care, made skilled judgment and was responsible for assessment and treatment. The patient is a 56y Male complaining of urinary retention.

## 2023-02-22 ENCOUNTER — EMERGENCY (EMERGENCY)
Facility: HOSPITAL | Age: 58
LOS: 1 days | Discharge: HOME | End: 2023-02-22
Attending: EMERGENCY MEDICINE | Admitting: EMERGENCY MEDICINE
Payer: MEDICAID

## 2023-02-22 VITALS
RESPIRATION RATE: 16 BRPM | HEART RATE: 90 BPM | OXYGEN SATURATION: 99 % | SYSTOLIC BLOOD PRESSURE: 133 MMHG | DIASTOLIC BLOOD PRESSURE: 67 MMHG

## 2023-02-22 VITALS
RESPIRATION RATE: 16 BRPM | TEMPERATURE: 97 F | OXYGEN SATURATION: 100 % | DIASTOLIC BLOOD PRESSURE: 43 MMHG | HEART RATE: 99 BPM | SYSTOLIC BLOOD PRESSURE: 83 MMHG | WEIGHT: 190.04 LBS

## 2023-02-22 DIAGNOSIS — I95.9 HYPOTENSION, UNSPECIFIED: ICD-10-CM

## 2023-02-22 DIAGNOSIS — Z87.440 PERSONAL HISTORY OF URINARY (TRACT) INFECTIONS: ICD-10-CM

## 2023-02-22 DIAGNOSIS — R55 SYNCOPE AND COLLAPSE: ICD-10-CM

## 2023-02-22 DIAGNOSIS — R74.02 ELEVATION OF LEVELS OF LACTIC ACID DEHYDROGENASE [LDH]: ICD-10-CM

## 2023-02-22 DIAGNOSIS — Z20.822 CONTACT WITH AND (SUSPECTED) EXPOSURE TO COVID-19: ICD-10-CM

## 2023-02-22 DIAGNOSIS — T83.511A INFECTION AND INFLAMMATORY REACTION DUE TO INDWELLING URETHRAL CATHETER, INITIAL ENCOUNTER: ICD-10-CM
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
## 2023-02-22 DIAGNOSIS — E86.0 DEHYDRATION: ICD-10-CM

## 2023-02-22 DIAGNOSIS — N40.0 BENIGN PROSTATIC HYPERPLASIA WITHOUT LOWER URINARY TRACT SYMPTOMS: ICD-10-CM
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
## 2023-02-22 DIAGNOSIS — N39.0 URINARY TRACT INFECTION, SITE NOT SPECIFIED: ICD-10-CM

## 2023-02-22 LAB
ANION GAP SERPL CALC-SCNC: 14 MMOL/L — SIGNIFICANT CHANGE UP (ref 5–17)
APPEARANCE UR: CLEAR — SIGNIFICANT CHANGE UP
BACTERIA # UR AUTO: PRESENT /HPF
BASOPHILS # BLD AUTO: 0.02 K/UL — SIGNIFICANT CHANGE UP (ref 0–0.2)
BASOPHILS NFR BLD AUTO: 0.2 % — SIGNIFICANT CHANGE UP (ref 0–2)
BILIRUB UR-MCNC: NEGATIVE — SIGNIFICANT CHANGE UP
BUN SERPL-MCNC: 16 MG/DL — SIGNIFICANT CHANGE UP (ref 7–23)
CALCIUM SERPL-MCNC: 9.6 MG/DL — SIGNIFICANT CHANGE UP (ref 8.4–10.5)
CHLORIDE SERPL-SCNC: 99 MMOL/L — SIGNIFICANT CHANGE UP (ref 96–108)
CO2 SERPL-SCNC: 26 MMOL/L — SIGNIFICANT CHANGE UP (ref 22–31)
COLOR SPEC: YELLOW — SIGNIFICANT CHANGE UP
COMMENT - URINE: SIGNIFICANT CHANGE UP
CREAT SERPL-MCNC: 1.27 MG/DL — SIGNIFICANT CHANGE UP (ref 0.5–1.3)
DIFF PNL FLD: ABNORMAL
EGFR: 66 ML/MIN/1.73M2 — SIGNIFICANT CHANGE UP
EOSINOPHIL # BLD AUTO: 0.01 K/UL — SIGNIFICANT CHANGE UP (ref 0–0.5)
EOSINOPHIL NFR BLD AUTO: 0.1 % — SIGNIFICANT CHANGE UP (ref 0–6)
EPI CELLS # UR: SIGNIFICANT CHANGE UP /HPF (ref 0–5)
GLUCOSE SERPL-MCNC: 142 MG/DL — HIGH (ref 70–99)
GLUCOSE UR QL: NEGATIVE — SIGNIFICANT CHANGE UP
HCT VFR BLD CALC: 40 % — SIGNIFICANT CHANGE UP (ref 39–50)
HGB BLD-MCNC: 13 G/DL — SIGNIFICANT CHANGE UP (ref 13–17)
IMM GRANULOCYTES NFR BLD AUTO: 0.2 % — SIGNIFICANT CHANGE UP (ref 0–0.9)
KETONES UR-MCNC: ABNORMAL MG/DL
LACTATE SERPL-SCNC: 1 MMOL/L — SIGNIFICANT CHANGE UP (ref 0.5–2)
LACTATE SERPL-SCNC: 3.4 MMOL/L — HIGH (ref 0.5–2)
LEUKOCYTE ESTERASE UR-ACNC: ABNORMAL
LYMPHOCYTES # BLD AUTO: 1.22 K/UL — SIGNIFICANT CHANGE UP (ref 1–3.3)
LYMPHOCYTES # BLD AUTO: 15.1 % — SIGNIFICANT CHANGE UP (ref 13–44)
MCHC RBC-ENTMCNC: 30.3 PG — SIGNIFICANT CHANGE UP (ref 27–34)
MCHC RBC-ENTMCNC: 32.5 GM/DL — SIGNIFICANT CHANGE UP (ref 32–36)
MCV RBC AUTO: 93.2 FL — SIGNIFICANT CHANGE UP (ref 80–100)
MONOCYTES # BLD AUTO: 0.38 K/UL — SIGNIFICANT CHANGE UP (ref 0–0.9)
MONOCYTES NFR BLD AUTO: 4.7 % — SIGNIFICANT CHANGE UP (ref 2–14)
NEUTROPHILS # BLD AUTO: 6.43 K/UL — SIGNIFICANT CHANGE UP (ref 1.8–7.4)
NEUTROPHILS NFR BLD AUTO: 79.7 % — HIGH (ref 43–77)
NITRITE UR-MCNC: POSITIVE
NRBC # BLD: 0 /100 WBCS — SIGNIFICANT CHANGE UP (ref 0–0)
PH UR: 7 — SIGNIFICANT CHANGE UP (ref 5–8)
PLATELET # BLD AUTO: 191 K/UL — SIGNIFICANT CHANGE UP (ref 150–400)
POTASSIUM SERPL-MCNC: 3.8 MMOL/L — SIGNIFICANT CHANGE UP (ref 3.5–5.3)
POTASSIUM SERPL-SCNC: 3.8 MMOL/L — SIGNIFICANT CHANGE UP (ref 3.5–5.3)
PROT UR-MCNC: 30 MG/DL
RBC # BLD: 4.29 M/UL — SIGNIFICANT CHANGE UP (ref 4.2–5.8)
RBC # FLD: 12.2 % — SIGNIFICANT CHANGE UP (ref 10.3–14.5)
RBC CASTS # UR COMP ASSIST: < 5 /HPF — SIGNIFICANT CHANGE UP
SARS-COV-2 RNA SPEC QL NAA+PROBE: NEGATIVE — SIGNIFICANT CHANGE UP
SODIUM SERPL-SCNC: 139 MMOL/L — SIGNIFICANT CHANGE UP (ref 135–145)
SP GR SPEC: 1.02 — SIGNIFICANT CHANGE UP (ref 1–1.03)
TROPONIN T SERPL-MCNC: 0.01 NG/ML — SIGNIFICANT CHANGE UP (ref 0–0.01)
UROBILINOGEN FLD QL: 0.2 E.U./DL — SIGNIFICANT CHANGE UP
WBC # BLD: 8.08 K/UL — SIGNIFICANT CHANGE UP (ref 3.8–10.5)
WBC # FLD AUTO: 8.08 K/UL — SIGNIFICANT CHANGE UP (ref 3.8–10.5)
WBC UR QL: > 10 /HPF

## 2023-02-22 PROCEDURE — 85025 COMPLETE CBC W/AUTO DIFF WBC: CPT

## 2023-02-22 PROCEDURE — 84484 ASSAY OF TROPONIN QUANT: CPT

## 2023-02-22 PROCEDURE — 96374 THER/PROPH/DIAG INJ IV PUSH: CPT

## 2023-02-22 PROCEDURE — 83605 ASSAY OF LACTIC ACID: CPT

## 2023-02-22 PROCEDURE — 87040 BLOOD CULTURE FOR BACTERIA: CPT

## 2023-02-22 PROCEDURE — 99285 EMERGENCY DEPT VISIT HI MDM: CPT

## 2023-02-22 PROCEDURE — 87086 URINE CULTURE/COLONY COUNT: CPT

## 2023-02-22 PROCEDURE — 93005 ELECTROCARDIOGRAM TRACING: CPT

## 2023-02-22 PROCEDURE — 99284 EMERGENCY DEPT VISIT MOD MDM: CPT | Mod: 25

## 2023-02-22 PROCEDURE — 81001 URINALYSIS AUTO W/SCOPE: CPT

## 2023-02-22 PROCEDURE — 87635 SARS-COV-2 COVID-19 AMP PRB: CPT

## 2023-02-22 PROCEDURE — 80048 BASIC METABOLIC PNL TOTAL CA: CPT

## 2023-02-22 PROCEDURE — 36415 COLL VENOUS BLD VENIPUNCTURE: CPT

## 2023-02-22 RX ORDER — CEFTRIAXONE 500 MG/1
1000 INJECTION, POWDER, FOR SOLUTION INTRAMUSCULAR; INTRAVENOUS ONCE
Refills: 0 | Status: COMPLETED | OUTPATIENT
Start: 2023-02-22 | End: 2023-02-22

## 2023-02-22 RX ORDER — CEFPODOXIME PROXETIL 100 MG
1 TABLET ORAL
Qty: 28 | Refills: 0
Start: 2023-02-22 | End: 2023-03-07

## 2023-02-22 RX ORDER — SODIUM CHLORIDE 9 MG/ML
1000 INJECTION INTRAMUSCULAR; INTRAVENOUS; SUBCUTANEOUS ONCE
Refills: 0 | Status: COMPLETED | OUTPATIENT
Start: 2023-02-22 | End: 2023-02-22

## 2023-02-22 RX ORDER — SODIUM CHLORIDE 9 MG/ML
500 INJECTION INTRAMUSCULAR; INTRAVENOUS; SUBCUTANEOUS ONCE
Refills: 0 | Status: COMPLETED | OUTPATIENT
Start: 2023-02-22 | End: 2023-02-22

## 2023-02-22 RX ADMIN — SODIUM CHLORIDE 2000 MILLILITER(S): 9 INJECTION INTRAMUSCULAR; INTRAVENOUS; SUBCUTANEOUS at 15:35

## 2023-02-22 RX ADMIN — SODIUM CHLORIDE 2000 MILLILITER(S): 9 INJECTION INTRAMUSCULAR; INTRAVENOUS; SUBCUTANEOUS at 14:22

## 2023-02-22 RX ADMIN — SODIUM CHLORIDE 1000 MILLILITER(S): 9 INJECTION INTRAMUSCULAR; INTRAVENOUS; SUBCUTANEOUS at 15:35

## 2023-02-22 RX ADMIN — CEFTRIAXONE 100 MILLIGRAM(S): 500 INJECTION, POWDER, FOR SOLUTION INTRAMUSCULAR; INTRAVENOUS at 14:55

## 2023-02-22 NOTE — ED PROVIDER NOTE - NSFOLLOWUPINSTRUCTIONS_ED_ALL_ED_FT
[facility]  Family Medicine  [address] [place] [address]  Phone: [phone]    Can take tylenol 650mg every 6hrs as needed for mild pain.  Stay well hydrated.  Return for fevers, persistent vomit, uncontrolled pain, worsening breathing, worsening lightheaded.  Follow up with primary doctor within 1-2 days.   Follow up with cardiologist. Can call [phone] ([facility]) to schedule appointment.     Syncope    Syncope refers to a condition in which a person temporarily loses consciousness. Syncope may also be called fainting or passing out. It is caused by a sudden decrease in blood flow to the brain. Even though most causes of syncope are not dangerous, syncope can be a sign of a serious medical problem. Your health care provider may do tests to find the reason why you are having syncope.    Signs that you may be about to faint include:  Feeling dizzy or light-headed.  Feeling nauseous.  Seeing all white or all black in your field of vision.  Having cold, clammy skin.  If you faint, get medical help right away.   Call your local emergency services (911 in the [place] Do not drive yourself to the hospital.    Follow these instructions at home:  Pay attention to any changes in your symptoms. Take these actions to stay safe and to help relieve your symptoms:    Lifestyle     Do not drive, use machinery, or play sports until your health care provider says it is okay. Do not drink alcohol. Do not use any products that contain nicotine or tobacco, such as cigarettes and e-cigarettes. If you need help quitting, ask your health care provider. Drink enough fluid to keep your urine pale yellow.    General instructions     Take over-the-counter and prescription medicines only as told by your health care provider. If you are taking blood pressure or heart medicine, get up slowly and take several minutes to sit and then stand. This can reduce dizziness or light-headedness. Have someone stay with you until you feel stable. If you start to feel like you might faint, lie down right away and raise (elevate) your feet above the level of your heart. Breathe deeply and steadily. Wait until all the symptoms have passed. Keep all follow-up visits as told by your health care provider. This is important.   Get help right away if you:  Have a severe headache.  Faint once or repeatedly.  Have pain in your chest, abdomen, or back.  Have a very fast or irregular heartbeat (palpitations).  Have pain when you breathe.  Are bleeding from your mouth or rectum, or you have black or tarry stool.  Have a seizure.  Are confused.  Have trouble walking.  Have severe weakness.  Have vision problems.  These symptoms may represent a serious problem that is an emergency. Do not wait to see if your symptoms will go away. Get medical help right away. Call your local emergency services (911 in the [place] Do not drive yourself to the hospital.

## 2023-02-22 NOTE — ED PROVIDER NOTE - OBJECTIVE STATEMENT
56 M pmh bph and chronic indwelling valdez, chronic UTIs w syncope, ?x5, stated worked out, no breakfast/lunch, head injury,recently started on flomax, denies cough, f/c, sob, chest pain, abd pain, or any other acute complaints. no prior cardiac hx. 56 M pmh bph and chronic indwelling valdez, chronic UTIs w syncope pta per EMS, stated worked out, no breakfast/lunch, no head injury, recently started on flomax, denies cough, f/c, sob, chest pain, abd pain, or any other acute complaints. no prior cardiac hx. requesting valdez exchange. Pt has not tried anything for symptoms, no other aggravating or relieving factors.

## 2023-02-22 NOTE — ED ADULT NURSE NOTE - OBJECTIVE STATEMENT
patient presents to ED with syncope x5 today after workout, denies cp, sob, dizziness, headache, fever, cough  BP checked 80s on triage, repeat checked 140s.  A&OX4, stable at bed. placed on monitor.

## 2023-02-22 NOTE — ED PROVIDER NOTE - CLINICAL SUMMARY MEDICAL DECISION MAKING FREE TEXT BOX
syncope/dehydration, no head injury, VS improving while in ED  Ddx: arrythmia, dehydration syncope/dehydration after no PO intake today, no head injury, VS improving while in ED iincluding BP, afebrile (refusing rectal temp in ED)  Ddx: arrythmia, dehydration, sepsis ?uti given indwelling valdez  -labs, cultures, abx/ivf, reassess.

## 2023-02-22 NOTE — ED PROVIDER NOTE - PATIENT PORTAL LINK FT
You can access the FollowMyHealth Patient Portal offered by [facility] by registering at the following website: [email or web address]. By joining [employer]’s FollowMyHealth portal, you will also be able to view your health information using other applications (apps) compatible with our system.

## 2023-02-22 NOTE — ED PROVIDER NOTE - PROGRESS NOTE DETAILS
VS improving, valdez exchanged, downtrending lactate after ivf, given one dose iv abx, will rx on dc given initial hypotension/lactate elevation. pt requesting dc, states has urology fu as outp.   Discussed with pt results of work up, strict return precautions, and need for follow up.  Pt expressed understanding and agrees with plan.

## 2023-02-22 NOTE — ED ADULT NURSE NOTE - CAS ELECT INFOMATION PROVIDED
Pt verbalized understanding of d/c instructions. VSS. PIV removed. Pt ambulated out of ED independently with a smooth and steady gait./DC instructions

## 2023-02-22 NOTE — ED PROVIDER NOTE - CARE PLAN
1 Principal Discharge DX:	Syncope  Secondary Diagnosis:	Dehydration  Secondary Diagnosis:	Jacques catheter problem  Secondary Diagnosis:	Recurrent UTI

## 2023-02-23 LAB
CULTURE RESULTS: SIGNIFICANT CHANGE UP
SPECIMEN SOURCE: SIGNIFICANT CHANGE UP

## 2023-02-27 LAB
CULTURE RESULTS: SIGNIFICANT CHANGE UP
CULTURE RESULTS: SIGNIFICANT CHANGE UP
SPECIMEN SOURCE: SIGNIFICANT CHANGE UP
SPECIMEN SOURCE: SIGNIFICANT CHANGE UP

## 2023-03-09 NOTE — ED ADULT NURSE NOTE - SUICIDE SCREENING DEPRESSION
TO asked to see if pt can be seen today at 1:45 instead of tomorrow. Spoke with pt. She can be here today at 1:45. PSR's: please reschedule to Today at 1:45 with Michael.    Thanks!! Negative

## 2023-04-13 NOTE — ED PROVIDER NOTE - CPE EDP GASTRO NORM
normal... Erythromycin Counseling:  I discussed with the patient the risks of erythromycin including but not limited to GI upset, allergic reaction, drug rash, diarrhea, increase in liver enzymes, and yeast infections.

## 2023-05-02 ENCOUNTER — EMERGENCY (EMERGENCY)
Facility: HOSPITAL | Age: 58
LOS: 1 days | Discharge: HOME | End: 2023-05-02
Attending: EMERGENCY MEDICINE | Admitting: EMERGENCY MEDICINE
Payer: MEDICAID

## 2023-05-02 VITALS
DIASTOLIC BLOOD PRESSURE: 94 MMHG | TEMPERATURE: 98 F | HEART RATE: 115 BPM | WEIGHT: 190.04 LBS | OXYGEN SATURATION: 97 % | SYSTOLIC BLOOD PRESSURE: 139 MMHG | RESPIRATION RATE: 18 BRPM

## 2023-05-02 VITALS
OXYGEN SATURATION: 98 % | HEART RATE: 95 BPM | DIASTOLIC BLOOD PRESSURE: 85 MMHG | RESPIRATION RATE: 18 BRPM | TEMPERATURE: 98 F | SYSTOLIC BLOOD PRESSURE: 135 MMHG

## 2023-05-02 LAB
APPEARANCE UR: CLEAR — SIGNIFICANT CHANGE UP
BACTERIA # UR AUTO: ABNORMAL /HPF
BILIRUB UR-MCNC: NEGATIVE — SIGNIFICANT CHANGE UP
COLOR SPEC: YELLOW — SIGNIFICANT CHANGE UP
DIFF PNL FLD: ABNORMAL
EPI CELLS # UR: SIGNIFICANT CHANGE UP /HPF (ref 0–5)
GLUCOSE UR QL: NEGATIVE — SIGNIFICANT CHANGE UP
KETONES UR-MCNC: NEGATIVE — SIGNIFICANT CHANGE UP
LEUKOCYTE ESTERASE UR-ACNC: ABNORMAL
NITRITE UR-MCNC: POSITIVE
PH UR: 7 — SIGNIFICANT CHANGE UP (ref 5–8)
PROT UR-MCNC: 100 MG/DL
RBC CASTS # UR COMP ASSIST: < 5 /HPF — SIGNIFICANT CHANGE UP
SP GR SPEC: >=1.03 — SIGNIFICANT CHANGE UP (ref 1–1.03)
UROBILINOGEN FLD QL: 0.2 E.U./DL — SIGNIFICANT CHANGE UP
WBC UR QL: ABNORMAL /HPF

## 2023-05-02 PROCEDURE — 81001 URINALYSIS AUTO W/SCOPE: CPT

## 2023-05-02 PROCEDURE — 87086 URINE CULTURE/COLONY COUNT: CPT

## 2023-05-02 PROCEDURE — 87184 SC STD DISK METHOD PER PLATE: CPT

## 2023-05-02 PROCEDURE — 87186 SC STD MICRODIL/AGAR DIL: CPT

## 2023-05-02 PROCEDURE — 99284 EMERGENCY DEPT VISIT MOD MDM: CPT

## 2023-05-02 PROCEDURE — 51702 INSERT TEMP BLADDER CATH: CPT

## 2023-05-02 PROCEDURE — 99283 EMERGENCY DEPT VISIT LOW MDM: CPT | Mod: 25

## 2023-05-02 RX ORDER — CIPROFLOXACIN LACTATE 400MG/40ML
1 VIAL (ML) INTRAVENOUS
Qty: 20 | Refills: 0
Start: 2023-05-02 | End: 2023-05-11

## 2023-05-02 RX ORDER — CIPROFLOXACIN LACTATE 400MG/40ML
1 VIAL (ML) INTRAVENOUS
Qty: 14 | Refills: 0
Start: 2023-05-02 | End: 2024-04-05

## 2023-05-02 NOTE — ED PROVIDER NOTE - CHIEF COMPLAINT
The patient is a 57y Male complaining of urinary catheter complications. Eyes with no visual disturbances.  Ears clean and dry and no hearing difficulties. Nose with pink mucosa and no drainage.  Mouth mucous membranes moist and pink.  No tenderness or swelling to throat or neck.

## 2023-05-02 NOTE — ED PROVIDER NOTE - CLINICAL SUMMARY MEDICAL DECISION MAKING FREE TEXT BOX
Patient with malodorous urine times several days in setting of chronic indwelling Jacques catheter that has not been changed in over 2 months.  Vital signs are stable patient looks well there is no evidence of complicated Jacques placement at this time.  Will replace Jacques and check urine with clean bag.  Reassess need for additional treatment and/or follow-up.  Stressed importance of urology follow-up and continuity of care with patient.

## 2023-05-02 NOTE — ED ADULT NURSE NOTE - OBJECTIVE STATEMENT
Pt is 57M c/o bladder "stinging" and malodorous urine from indwelling urethral catheter. Pt states was dx w UTI last time seen at [facility] and never picked up medications. Denies flank pain, drainage from urinary meatus, f/c/n/v/d. On RN assessment urine is malodorous, cloudy, yellow.

## 2023-05-02 NOTE — ED PROVIDER NOTE - NSFOLLOWUPINSTRUCTIONS_ED_ALL_ED_FT
Urinary Tract Infection    A urinary tract infection (UTI) is an infection of any part of the urinary tract, which includes the kidneys, ureters, bladder, and urethra. Risk factors include ignoring your need to urinate, wiping back to front if female, being an uncircumcised male, and having diabetes or a weak immune system. Symptoms include frequent urination, pain or burning with urination, foul smelling urine, cloudy urine, pain in the lower abdomen, blood in the urine, and fever. If you were prescribed an antibiotic medicine, take it as told by your health care provider. Do not stop taking the antibiotic even if you start to feel better.    SEEK IMMEDIATE MEDICAL CARE IF YOU HAVE ANY OF THE FOLLOWING SYMPTOMS: severe back or abdominal pain, fever, inability to keep fluids or medicine down, dizziness/lightheadedness, or a change in mental status.       IT IS IMPERATIVE THAT YOU FOLLOW UP WITH YOUR UROLOGIST WITHIN 1 WEEK, AND MOVING FORWARD FOLLOW UP WITH THE UROLOGIST FOR REGULAR HERNANDEZ CATHETER CHECKS AND CHANGES AND TO MONITOR YOUR URINE FOR ANY INFECTION.  ALTHOUGH IT IS MOST LIKELY THAT YOUR URINE IS CONTAMINATED, YOU OPTED TO BE TREATED WITH ANTIBIOTICS AND UNDERSTAND THERE IS A RISK FOR ONGOING RESISTANCE BUILD UP TO BOTH THIS ANTIBIOTIC AS WELL AS OTHERS IN THE FUTURE.

## 2023-05-02 NOTE — ED ADULT NURSE NOTE - NSICDXPASTMEDICALHX_GEN_ALL_CORE_FT
PAST MEDICAL HISTORY:  Benign prostatic hyperplasia, presence of lower urinary tract symptoms unspecified, unspecified morphology     Urinary retention

## 2023-05-02 NOTE — ED ADULT NURSE REASSESSMENT NOTE - NS ED NURSE REASSESS COMMENT FT1
Pt received from night shift RN. Pt A&Ox4. Pt is conversive and has a steady agit. Awaiting UA result. Denies any discomfort at present. Assessment ongoing. Will cont to monitor.

## 2023-05-02 NOTE — ED PROVIDER NOTE - OBJECTIVE STATEMENT
57-year-old male with history of BPH, chronic indwelling Jacques catheter times years, followed by urology at [facility] presenting for Jacques change as last change was over 2 months ago.  Patient reports noticing foul-smelling urine over the past several days concerning for infection.  No fevers chills, flank pain, abdominal pain.

## 2023-05-02 NOTE — ED PROVIDER NOTE - PATIENT PORTAL LINK FT
You can access the FollowMyHealth Patient Portal offered by [facility] by registering at the following website: [email or web address]. By joining [employer]’s FollowMyHealth portal, you will also be able to view your health information using other applications (apps) compatible with our system.

## 2023-05-04 LAB
-  AMPICILLIN/SULBACTAM: SIGNIFICANT CHANGE UP
-  AMPICILLIN: SIGNIFICANT CHANGE UP
-  CEFAZOLIN: SIGNIFICANT CHANGE UP
-  CEFTRIAXONE: SIGNIFICANT CHANGE UP
-  CIPROFLOXACIN: SIGNIFICANT CHANGE UP
-  ERTAPENEM: SIGNIFICANT CHANGE UP
-  GENTAMICIN: SIGNIFICANT CHANGE UP
-  NITROFURANTOIN: SIGNIFICANT CHANGE UP
-  PIPERACILLIN/TAZOBACTAM: SIGNIFICANT CHANGE UP
-  TOBRAMYCIN: SIGNIFICANT CHANGE UP
-  TRIMETHOPRIM/SULFAMETHOXAZOLE: SIGNIFICANT CHANGE UP
METHOD TYPE: SIGNIFICANT CHANGE UP

## 2023-05-05 DIAGNOSIS — R82.998 OTHER ABNORMAL FINDINGS IN URINE: ICD-10-CM

## 2023-05-05 DIAGNOSIS — N40.0 BENIGN PROSTATIC HYPERPLASIA WITHOUT LOWER URINARY TRACT SYMPTOMS: ICD-10-CM

## 2023-05-05 LAB
-  AZTREONAM: SIGNIFICANT CHANGE UP
-  CEFEPIME: SIGNIFICANT CHANGE UP
-  CIPROFLOXACIN: SIGNIFICANT CHANGE UP
-  GENTAMICIN: SIGNIFICANT CHANGE UP
-  LEVOFLOXACIN: SIGNIFICANT CHANGE UP
-  PIPERACILLIN/TAZOBACTAM: SIGNIFICANT CHANGE UP
-  TOBRAMYCIN: SIGNIFICANT CHANGE UP
CULTURE RESULTS: SIGNIFICANT CHANGE UP
METHOD TYPE: SIGNIFICANT CHANGE UP
METHOD TYPE: SIGNIFICANT CHANGE UP
ORGANISM # SPEC MICROSCOPIC CNT: SIGNIFICANT CHANGE UP
SPECIMEN SOURCE: SIGNIFICANT CHANGE UP

## 2023-05-18 NOTE — ED ADULT TRIAGE NOTE - BP NONINVASIVE DIASTOLIC (MM HG)
97 Vital signs reviewed  GENERAL: Patient nontoxic appearing, NAD  HEAD: NCAT  EYES: Anicteric  ENT: MMM  NECK: Supple, non tender  RESPIRATORY: Normal respiratory effort. CTA B/L. No wheezing, rales, rhonchi  CARDIOVASCULAR: Regular rate and rhythm  ABDOMEN: Soft. Nondistended. +suprapubic tenderness to palpation. no cva tenderness.   MUSCULOSKELETAL/EXTREMITIES: Brisk cap refill. 2+ radial pulses. No leg edema.  SKIN:  Warm and dry  NEURO: AAOx3. No gross FND.  PSYCHIATRIC: Cooperative. Affect appropriate. Vital signs reviewed  GENERAL: Patient nontoxic appearing, NAD  HEAD: NCAT  EYES: Anicteric  ENT: MMM  NECK: Supple, non tender  RESPIRATORY: Normal respiratory effort. CTA B/L. No wheezing, rales, rhonchi  CARDIOVASCULAR: Regular rate and rhythm  ABDOMEN: Soft. Nondistended. +suprapubic tenderness to palpation +RLQ tenderness to palpation, no cva tenderness.    exam declined by patient  MUSCULOSKELETAL/EXTREMITIES: Brisk cap refill. 2+ radial pulses. No leg edema.  SKIN:  Warm and dry  NEURO: AAOx3. No gross FND.  PSYCHIATRIC: Cooperative. Affect appropriate.

## 2023-05-24 NOTE — ED PROVIDER NOTE - CPE EDP RESP NORM
Please have labs drawn at your earliest convenience.  You should fast 12 hours prior to arriving at the lab - during these 12 hours you may have water, black coffee, black tea - nothing with cream or sugar and no solid foods.    Our office will contact you with results after they have been reviewed.  Please allow 48 - 72 hours for most results, some may take longer.    Please keep in mind that results may post immediately to your online portal, even before we have a chance to review them.  Once we have had the opportunity to review results, we will post comments and have our staff contact you with these results and any further instructions or recommendations.      Please call our office if you do not hear from our office in 7 days.      normal...

## 2023-06-17 NOTE — ED PROVIDER NOTE - NS ED ATTENDING STATEMENT MOD
I have personally performed a face to face diagnostic evaluation on this patient. I have reviewed the ACP note and agree with the history, exam and plan of care, except as noted.
normal (ped)...

## 2023-08-03 ENCOUNTER — EMERGENCY (EMERGENCY)
Facility: HOSPITAL | Age: 58
LOS: 1 days | Discharge: HOME | End: 2023-08-03
Attending: EMERGENCY MEDICINE | Admitting: EMERGENCY MEDICINE
Payer: MEDICAID

## 2023-08-03 VITALS
HEART RATE: 94 BPM | TEMPERATURE: 99 F | RESPIRATION RATE: 18 BRPM | DIASTOLIC BLOOD PRESSURE: 83 MMHG | OXYGEN SATURATION: 99 % | SYSTOLIC BLOOD PRESSURE: 139 MMHG

## 2023-08-03 VITALS
SYSTOLIC BLOOD PRESSURE: 142 MMHG | HEART RATE: 97 BPM | WEIGHT: 190.04 LBS | DIASTOLIC BLOOD PRESSURE: 86 MMHG | OXYGEN SATURATION: 97 % | TEMPERATURE: 98 F | HEIGHT: 72 IN | RESPIRATION RATE: 18 BRPM

## 2023-08-03 DIAGNOSIS — Z96.0 PRESENCE OF UROGENITAL IMPLANTS: ICD-10-CM

## 2023-08-03 DIAGNOSIS — Z87.438 PERSONAL HISTORY OF OTHER DISEASES OF MALE GENITAL ORGANS: ICD-10-CM

## 2023-08-03 DIAGNOSIS — R82.998 OTHER ABNORMAL FINDINGS IN URINE: ICD-10-CM

## 2023-08-03 DIAGNOSIS — N39.0 URINARY TRACT INFECTION, SITE NOT SPECIFIED: ICD-10-CM

## 2023-08-03 LAB
APPEARANCE UR: ABNORMAL
BACTERIA # UR AUTO: PRESENT /HPF
BILIRUB UR-MCNC: NEGATIVE — SIGNIFICANT CHANGE UP
COLOR SPEC: ABNORMAL
DIFF PNL FLD: ABNORMAL
EPI CELLS # UR: SIGNIFICANT CHANGE UP /HPF (ref 0–5)
GLUCOSE UR QL: NEGATIVE — SIGNIFICANT CHANGE UP
KETONES UR-MCNC: NEGATIVE — SIGNIFICANT CHANGE UP
LEUKOCYTE ESTERASE UR-ACNC: ABNORMAL
NITRITE UR-MCNC: POSITIVE
PH UR: 7.5 — SIGNIFICANT CHANGE UP (ref 5–8)
PROT UR-MCNC: 100 MG/DL
RBC CASTS # UR COMP ASSIST: ABNORMAL /HPF
SP GR SPEC: 1.01 — SIGNIFICANT CHANGE UP (ref 1–1.03)
UROBILINOGEN FLD QL: 1 E.U./DL — SIGNIFICANT CHANGE UP
WBC UR QL: ABNORMAL /HPF

## 2023-08-03 PROCEDURE — 87186 SC STD MICRODIL/AGAR DIL: CPT

## 2023-08-03 PROCEDURE — 81001 URINALYSIS AUTO W/SCOPE: CPT

## 2023-08-03 PROCEDURE — 99284 EMERGENCY DEPT VISIT MOD MDM: CPT

## 2023-08-03 PROCEDURE — 87086 URINE CULTURE/COLONY COUNT: CPT

## 2023-08-03 PROCEDURE — 51702 INSERT TEMP BLADDER CATH: CPT

## 2023-08-03 PROCEDURE — 99283 EMERGENCY DEPT VISIT LOW MDM: CPT | Mod: 25

## 2023-08-03 NOTE — ED ADULT NURSE NOTE - NSFALLUNIVINTERV_ED_ALL_ED
Bed/Stretcher in lowest position, wheels locked, appropriate side rails in place/Call bell, personal items and telephone in reach/Instruct patient to call for assistance before getting out of bed/chair/stretcher/Non-slip footwear applied when patient is off stretcher/[place] to call system/Physically safe environment - no spills, clutter or unnecessary equipment/Purposeful proactive rounding/Room/bathroom lighting operational, light cord in reach

## 2023-08-03 NOTE — ED PROVIDER NOTE - PATIENT PORTAL LINK FT
You can access the FollowMyHealth Patient Portal offered by [facility] by registering at the following website: [email or web address]. By joining [employer]’s FollowMyHealth portal, you will also be able to view your health information using other applications (apps) compatible with our system.

## 2023-08-03 NOTE — ED PROVIDER NOTE - PHYSICAL EXAMINATION
CONSTITUTIONAL: Well-appearing;  in no apparent distress.   HEAD: Normocephalic; atraumatic.   EYES: PERRL; EOM intact; conjunctiva and sclera clear  ENT: normal nose; no rhinorrhea; normal pharynx with no erythema or lesions.   NECK: Supple; non-tender; no LAD  CARDIOVASCULAR: Normal S1, S2; No audible murmurs. Regular rate and rhythm.   RESPIRATORY: Breathing easily; breath sounds clear and equal bilaterally; no wheezes, rhonchi, or rales.  GI: Soft; non-distended; non-tender; no palpable organomegaly.   : dark yellow urine in bag

## 2023-08-03 NOTE — ED PROVIDER NOTE - NS ED ATTENDING STATEMENT MOD
This was a shared visit with the YUKO. I reviewed and verified the documentation and independently performed the documented:

## 2023-08-03 NOTE — ED PROVIDER NOTE - ATTENDING APP SHARED VISIT CONTRIBUTION OF CARE
Pt is a 56yo m, h/o chronic indwelling, bph, who p/w c/o foul smell to urine. Pt has not had his valdez changed in 2 months. No fever, chills, flank pain, abd pain, n/v, gross hematuria... AVSS. PE as above. Abd exam benign. No cvat. UA + for nitrites, leuk est, 5-10 wbc, many rbc. Prior ucx reviewed and grew pseudomonas in 5/23, tx'd w/ cipro. Valdez changed. Will tx w/ bactrim based on prior ucx sensitivities. Pt advised on f/u with urology for re-evaluation and further management. Pt given return precautions and is understanding of discharge plan of care.

## 2023-08-03 NOTE — ED PROVIDER NOTE - CLINICAL SUMMARY MEDICAL DECISION MAKING FREE TEXT BOX
57-year-old male with history of BPH, chronic indwelling Valdez catheter times years, followed by urology at [facility] c/o foul smelling urine x 1 month and needs valdez changed. Last changed 2 months ago. Pt states he needs to see his urologist but has been busy working. Pt concerned he has an infection because in the past when his urine has a foul smell he is infected. No fevers chills, flank pain, abdominal pain, n/v 57-year-old male with history of BPH, chronic indwelling Valedz catheter times years, followed by urology at [facility] c/o foul smelling urine x 1 month and needs valdez changed. Last changed 2 months ago. Pt states he needs to see his urologist but has been busy working. Pt concerned he has an infection because in the past when his urine has a foul smell he is infected. No fevers chills, flank pain, abdominal pain, n/v, UA +, valdez changed. will f/u with urology

## 2023-08-03 NOTE — ED PROVIDER NOTE - OBJECTIVE STATEMENT
Due to patient not having health insurance, he will wait to perform lab work and other tests. He states he will be filling out information for insurance today. GI referral was faxed to Juanita.    57-year-old male with history of BPH, chronic indwelling Valdez catheter times years, followed by urology at [facility] c/o foul smelling urine x 1 month and needs valdez changed. Last changed 2 months ago. Pt states he needs to see his urologist but has been busy working. Pt concerned he has an infection because in the past when his urine has a foul smell he is infected. No fevers chills, flank pain, abdominal pain, n/v

## 2023-08-03 NOTE — ED PROVIDER NOTE - NSFOLLOWUPINSTRUCTIONS_ED_ALL_ED_FT
Urinary Tract Infection    A urinary tract infection (UTI) is an infection of any part of the urinary tract, which includes the kidneys, ureters, bladder, and urethra. Risk factors include ignoring your need to urinate, wiping back to front if female, being an uncircumcised male, and having diabetes or a weak immune system. Symptoms include frequent urination, pain or burning with urination, foul smelling urine, cloudy urine, pain in the lower abdomen, blood in the urine, and fever. If you were prescribed an antibiotic medicine, take it as told by your health care provider. Do not stop taking the antibiotic even if you start to feel better.    SEEK IMMEDIATE MEDICAL CARE IF YOU HAVE ANY OF THE FOLLOWING SYMPTOMS: severe back or abdominal pain, fever, inability to keep fluids or medicine down, dizziness/lightheadedness, or a change in mental status.    Jacques Catheter Placement and Care    WHAT YOU NEED TO KNOW:    A Jacques catheter is a sterile tube that is inserted into your bladder to drain urine. It is also called an indwelling urinary catheter.    DISCHARGE INSTRUCTIONS:    Return to the emergency department if:    Your catheter comes out.    You suddenly have material that looks like sand in the tubing or drainage bag.    No urine is draining into the bag and you have checked the system.    You have pain in your hip, back, pelvis, or lower abdomen.    You are confused or cannot think clearly.  Call your doctor or urologist if:    You have a fever.    You have bladder spasms for more than 1 day after the catheter is placed.    You see blood in the tubing or drainage bag.    You have a rash or itching where the catheter tube is secured to your skin.    Urine leaks from or around the catheter, tubing, or drainage bag.    The closed drainage system has accidently come open or apart.    You see a layer of crystals inside the tubing.    You have questions or concerns about your condition or care.  Care for your catheter and drainage bag: You can reduce your risk for infection and injury by caring for your catheter and drainage bag properly.    Wash your hands often. Wash before and after you touch your catheter, tubing, or drainage bag. Use soap and water. Wear clean disposable gloves when you care for your catheter or disconnect the drainage bag. Wash your hands before you prepare or eat food.  Handwashing      Clean your genital area 2 times every day. Clean your catheter area and anal opening after every bowel movement.  For men: Use a soapy cloth to clean the tip of your penis. Start where the catheter enters. Wipe backward making sure to pull back the foreskin. Then use a cloth with clear water in the same direction to clean away the soap.    For women: Use a soapy cloth to clean the area that the catheter enters your body. Make sure to separate your labia and wipe toward the anus. Then use a cloth with clear water and wipe in the same direction.    Secure the catheter tube so you do not pull or move the catheter. This helps prevent pain and bladder spasms. Healthcare providers will show you how to use medical tape or a strap to secure the catheter tube to your body.    Keep a closed drainage system. Your catheter should always be attached to the drainage bag to form a closed system. Do not disconnect any part of the closed system unless you need to change the bag.    Keep the drainage bag below the level of your waist. This helps stop urine from moving back up the tubing and into your bladder. Do not loop or kink the tubing. This can cause urine to back up and collect in your bladder. Do not let the drainage bag touch or lie on the floor.    Empty the drainage bag when needed. The weight of a full drainage bag can be painful. Empty the drainage bag every 3 to 6 hours or when it is ? full.    Clean and change the drainage bag as directed. Ask your healthcare provider how often you should change the drainage bag and what cleaning solution to use. Wear disposable gloves when you change the bag. Do not allow the end of the catheter or tubing to touch anything. Clean the ends with an alcohol pad before you reconnect them.  What to do if problems develop:    No urine is draining into the bag:  Check for kinks in the tubing and straighten them out.    Check the tape or strap used to secure the catheter tube to your skin. Make sure it is not blocking the tube.    Make sure you are not sitting or lying on the tubing.    Make sure the urine bag is hanging below the level of your waist.    Urine leaks from or around the catheter, tubing, or drainage bag: Check if the closed drainage system has accidently come open or apart. Clean the catheter and tubing ends with a new alcohol pad and reconnect them.  Follow up with your doctor or urologist as directed: Write down your questions so you remember to ask them during your visits.

## 2023-08-03 NOTE — ED ADULT NURSE REASSESSMENT NOTE - NS ED NURSE REASSESS COMMENT FT1
14F Jacques cath inserted with sterile technique. Pt has some blood clots but urine now draining, dark yellow. Pt denies any pain or other medial complaints at this time.

## 2023-08-17 ENCOUNTER — APPOINTMENT (OUTPATIENT)
Dept: UROLOGY | Facility: CLINIC | Age: 58
End: 2023-08-17

## 2023-09-13 NOTE — ED PROVIDER NOTE - NSFOLLOWUPINSTRUCTIONS_ED_ALL_ED_FT
Please follow up with a Urologist in 2-3 days. Return to the ER if you develop any concerning symptoms.     Urinary Tract Infection    A urinary tract infection (UTI) is an infection of any part of the urinary tract, which includes the kidneys, ureters, bladder, and urethra. Risk factors include ignoring your need to urinate, wiping back to front if female, being an uncircumcised male, and having diabetes or a weak immune system. Symptoms include frequent urination, pain or burning with urination, foul smelling urine, cloudy urine, pain in the lower abdomen, blood in the urine, and fever. If you were prescribed an antibiotic medicine, take it as told by your health care provider. Do not stop taking the antibiotic even if you start to feel better.    SEEK IMMEDIATE MEDICAL CARE IF YOU HAVE ANY OF THE FOLLOWING SYMPTOMS: severe back or abdominal pain, fever, inability to keep fluids or medicine down, dizziness/lightheadedness, or a change in mental status.      Acute Urinary Retention, Male       Acute urinary retention is a condition in which a person is unable to pass urine or can only pass a little urine. This condition can happen suddenly and last for a short time. If left untreated, it can become long-term (chronic) and result in kidney damage or other serious complications.      What are the causes?    This condition may be caused by:  •Obstruction or narrowing of the tube that drains the bladder (urethra). This may be caused by surgery, problems with nearby organs, or injury to the bladder or urethra.      •Problems with the nerves in the bladder.      •Tumors in the area of the pelvis, bladder, or urethra.      •Certain medicines.      •Bladder or urinary tract infection.      •Constipation.        What increases the risk?    This condition is more likely to develop in older men. As men age, their prostate may become larger and may start to press or squeeze on the bladder or the urethra. Other chronic health conditions can increase the risk of acute urinary retention. These include:  •Diseases such as multiple sclerosis.      •Spinal cord injuries.      •Diabetes.      •Degenerative cognitive conditions, such as delirium or dementia.      •Psychological conditions. A man may hold his urine due to trauma or because he does not want to use the bathroom.        What are the signs or symptoms?    Symptoms of this condition include:  •Trouble urinating.      •Pain in the lower abdomen.        How is this diagnosed?    This condition is diagnosed based on a physical exam and your medical history. You may also have other tests, including:  •An ultrasound of the bladder or kidneys or both.      •Blood tests.      •A urine analysis.      •Additional tests may be needed, such as a CT scan, MRI, and kidney or bladder function tests.        How is this treated?    Treatment for this condition may include:  •Medicines.      •Placing a thin, sterile tube (catheter) into the bladder to drain urine out of the body. This is called an indwelling urinary catheter. After it is inserted, the catheter is held in place with a small balloon that is filled with sterile water. Urine drains from the catheter into a collection bag outside of the body.      •Behavioral therapy.      •Treatment for other conditions.      If needed, you may be treated in the hospital for kidney function problems or to manage other complications.      Follow these instructions at home:    Medicines     •Take over-the-counter and prescription medicines only as told by your health care provider. Avoid certain medicines, such as decongestants, antihistamines, and some prescription medicines. Do not take any medicine unless your health care provider approves.      •If you were prescribed an antibiotic medicine, take it as told by your health care provider. Do not stop using the antibiotic even if you start to feel better.      General instructions     • Do not use any products that contain nicotine or tobacco. These products include cigarettes, chewing tobacco, and vaping devices, such as e-cigarettes. If you need help quitting, ask your health care provider.      •Drink enough fluid to keep your urine pale yellow.      •If you have an indwelling urinary catheter, follow the instructions from your health care provider.      •Monitor any changes in your symptoms. Tell your health care provider about any changes.      •If instructed, monitor your blood pressure at home. Report changes as told by your health care provider.      •Keep all follow-up visits. This is important.        Contact a health care provider if:    •You have uncomfortable bladder contractions that you cannot control (spasms).      •You leak urine with the spasms.        Get help right away if:    •You have chills or a fever.      •You have blood in your urine.    •You have a catheter and the following happens:  •Your catheter stops draining urine.      •Your catheter falls out.          Summary    •Acute urinary retention is a condition in which a person is unable to pass urine or can only pass a little urine. If left untreated, this condition can result in kidney damage or other serious complications.      •An enlarged prostate may cause this condition. As men age, their prostate gland may become larger and may press or squeeze on the bladder or the urethra.      •Treatment for this condition may include medicines and placement of an indwelling urinary catheter.      •Monitor any changes in your symptoms. Tell your health care provider about any changes.      This information is not intended to replace advice given to you by your health care provider. Make sure you discuss any questions you have with your health care provider. Yes

## 2023-12-15 NOTE — ED ADULT NURSE NOTE - DISCHARGE DATE/TIME
[Follow-Up Visit] : a follow-up visit for [Knee Arthroscopy, Aftercare] : knee arthroscopy, aftercare [FreeTextEntry2] : left knee post op arthroscopy surgery done on 11/15/23 26-Sep-2019 18:08

## 2023-12-20 ENCOUNTER — EMERGENCY (EMERGENCY)
Facility: HOSPITAL | Age: 58
LOS: 1 days | Discharge: HOME | End: 2023-12-20
Attending: STUDENT IN AN ORGANIZED HEALTH CARE EDUCATION/TRAINING PROGRAM | Admitting: STUDENT IN AN ORGANIZED HEALTH CARE EDUCATION/TRAINING PROGRAM
Payer: MEDICAID

## 2023-12-20 VITALS
RESPIRATION RATE: 18 BRPM | HEART RATE: 119 BPM | SYSTOLIC BLOOD PRESSURE: 151 MMHG | DIASTOLIC BLOOD PRESSURE: 90 MMHG | TEMPERATURE: 98 F | OXYGEN SATURATION: 97 %

## 2023-12-20 DIAGNOSIS — R33.8 OTHER RETENTION OF URINE: ICD-10-CM

## 2023-12-20 DIAGNOSIS — R30.9 PAINFUL MICTURITION, UNSPECIFIED: ICD-10-CM

## 2023-12-20 DIAGNOSIS — N39.0 URINARY TRACT INFECTION, SITE NOT SPECIFIED: ICD-10-CM

## 2023-12-20 DIAGNOSIS — Z87.891 PERSONAL HISTORY OF NICOTINE DEPENDENCE: ICD-10-CM

## 2023-12-20 DIAGNOSIS — Z87.440 PERSONAL HISTORY OF URINARY (TRACT) INFECTIONS: ICD-10-CM

## 2023-12-20 DIAGNOSIS — N40.1 BENIGN PROSTATIC HYPERPLASIA WITH LOWER URINARY TRACT SYMPTOMS: ICD-10-CM

## 2023-12-20 LAB
ANION GAP SERPL CALC-SCNC: 11 MMOL/L — SIGNIFICANT CHANGE UP (ref 5–17)
ANION GAP SERPL CALC-SCNC: 11 MMOL/L — SIGNIFICANT CHANGE UP (ref 5–17)
APPEARANCE UR: ABNORMAL
APPEARANCE UR: ABNORMAL
BACTERIA # UR AUTO: ABNORMAL /HPF
BACTERIA # UR AUTO: ABNORMAL /HPF
BILIRUB UR-MCNC: NEGATIVE — SIGNIFICANT CHANGE UP
BILIRUB UR-MCNC: NEGATIVE — SIGNIFICANT CHANGE UP
BUN SERPL-MCNC: 16 MG/DL — SIGNIFICANT CHANGE UP (ref 7–23)
BUN SERPL-MCNC: 16 MG/DL — SIGNIFICANT CHANGE UP (ref 7–23)
CALCIUM SERPL-MCNC: 9.7 MG/DL — SIGNIFICANT CHANGE UP (ref 8.4–10.5)
CALCIUM SERPL-MCNC: 9.7 MG/DL — SIGNIFICANT CHANGE UP (ref 8.4–10.5)
CAST: 14 /LPF — HIGH (ref 0–4)
CAST: 14 /LPF — HIGH (ref 0–4)
CHLORIDE SERPL-SCNC: 100 MMOL/L — SIGNIFICANT CHANGE UP (ref 96–108)
CHLORIDE SERPL-SCNC: 100 MMOL/L — SIGNIFICANT CHANGE UP (ref 96–108)
CO2 SERPL-SCNC: 27 MMOL/L — SIGNIFICANT CHANGE UP (ref 22–31)
CO2 SERPL-SCNC: 27 MMOL/L — SIGNIFICANT CHANGE UP (ref 22–31)
COARSE GRAN CASTS #/AREA URNS AUTO: PRESENT
COARSE GRAN CASTS #/AREA URNS AUTO: PRESENT
COLOR SPEC: YELLOW — SIGNIFICANT CHANGE UP
COLOR SPEC: YELLOW — SIGNIFICANT CHANGE UP
CREAT SERPL-MCNC: 1.01 MG/DL — SIGNIFICANT CHANGE UP (ref 0.5–1.3)
CREAT SERPL-MCNC: 1.01 MG/DL — SIGNIFICANT CHANGE UP (ref 0.5–1.3)
DIFF PNL FLD: ABNORMAL
DIFF PNL FLD: ABNORMAL
EGFR: 87 ML/MIN/1.73M2 — SIGNIFICANT CHANGE UP
EGFR: 87 ML/MIN/1.73M2 — SIGNIFICANT CHANGE UP
GLUCOSE SERPL-MCNC: 111 MG/DL — HIGH (ref 70–99)
GLUCOSE SERPL-MCNC: 111 MG/DL — HIGH (ref 70–99)
GLUCOSE UR QL: NEGATIVE MG/DL — SIGNIFICANT CHANGE UP
GLUCOSE UR QL: NEGATIVE MG/DL — SIGNIFICANT CHANGE UP
HCT VFR BLD CALC: 42.4 % — SIGNIFICANT CHANGE UP (ref 39–50)
HCT VFR BLD CALC: 42.4 % — SIGNIFICANT CHANGE UP (ref 39–50)
HGB BLD-MCNC: 14.1 G/DL — SIGNIFICANT CHANGE UP (ref 13–17)
HGB BLD-MCNC: 14.1 G/DL — SIGNIFICANT CHANGE UP (ref 13–17)
KETONES UR-MCNC: NEGATIVE MG/DL — SIGNIFICANT CHANGE UP
KETONES UR-MCNC: NEGATIVE MG/DL — SIGNIFICANT CHANGE UP
LEUKOCYTE ESTERASE UR-ACNC: ABNORMAL
LEUKOCYTE ESTERASE UR-ACNC: ABNORMAL
MCHC RBC-ENTMCNC: 29.6 PG — SIGNIFICANT CHANGE UP (ref 27–34)
MCHC RBC-ENTMCNC: 29.6 PG — SIGNIFICANT CHANGE UP (ref 27–34)
MCHC RBC-ENTMCNC: 33.3 GM/DL — SIGNIFICANT CHANGE UP (ref 32–36)
MCHC RBC-ENTMCNC: 33.3 GM/DL — SIGNIFICANT CHANGE UP (ref 32–36)
MCV RBC AUTO: 89.1 FL — SIGNIFICANT CHANGE UP (ref 80–100)
MCV RBC AUTO: 89.1 FL — SIGNIFICANT CHANGE UP (ref 80–100)
NITRITE UR-MCNC: POSITIVE
NITRITE UR-MCNC: POSITIVE
NRBC # BLD: 0 /100 WBCS — SIGNIFICANT CHANGE UP (ref 0–0)
NRBC # BLD: 0 /100 WBCS — SIGNIFICANT CHANGE UP (ref 0–0)
PH UR: 7 — SIGNIFICANT CHANGE UP (ref 5–8)
PH UR: 7 — SIGNIFICANT CHANGE UP (ref 5–8)
PLATELET # BLD AUTO: 259 K/UL — SIGNIFICANT CHANGE UP (ref 150–400)
PLATELET # BLD AUTO: 259 K/UL — SIGNIFICANT CHANGE UP (ref 150–400)
POTASSIUM SERPL-MCNC: 4 MMOL/L — SIGNIFICANT CHANGE UP (ref 3.5–5.3)
POTASSIUM SERPL-MCNC: 4 MMOL/L — SIGNIFICANT CHANGE UP (ref 3.5–5.3)
POTASSIUM SERPL-SCNC: 4 MMOL/L — SIGNIFICANT CHANGE UP (ref 3.5–5.3)
POTASSIUM SERPL-SCNC: 4 MMOL/L — SIGNIFICANT CHANGE UP (ref 3.5–5.3)
PROT UR-MCNC: 30 MG/DL
PROT UR-MCNC: 30 MG/DL
RBC # BLD: 4.76 M/UL — SIGNIFICANT CHANGE UP (ref 4.2–5.8)
RBC # BLD: 4.76 M/UL — SIGNIFICANT CHANGE UP (ref 4.2–5.8)
RBC # FLD: 12.3 % — SIGNIFICANT CHANGE UP (ref 10.3–14.5)
RBC # FLD: 12.3 % — SIGNIFICANT CHANGE UP (ref 10.3–14.5)
RBC CASTS # UR COMP ASSIST: 43 /HPF — HIGH (ref 0–4)
RBC CASTS # UR COMP ASSIST: 43 /HPF — HIGH (ref 0–4)
SODIUM SERPL-SCNC: 138 MMOL/L — SIGNIFICANT CHANGE UP (ref 135–145)
SODIUM SERPL-SCNC: 138 MMOL/L — SIGNIFICANT CHANGE UP (ref 135–145)
SP GR SPEC: 1.01 — SIGNIFICANT CHANGE UP (ref 1–1.03)
SP GR SPEC: 1.01 — SIGNIFICANT CHANGE UP (ref 1–1.03)
SQUAMOUS # UR AUTO: 0 /HPF — SIGNIFICANT CHANGE UP (ref 0–5)
SQUAMOUS # UR AUTO: 0 /HPF — SIGNIFICANT CHANGE UP (ref 0–5)
UROBILINOGEN FLD QL: 1 MG/DL — SIGNIFICANT CHANGE UP (ref 0.2–1)
UROBILINOGEN FLD QL: 1 MG/DL — SIGNIFICANT CHANGE UP (ref 0.2–1)
WBC # BLD: 10.11 K/UL — SIGNIFICANT CHANGE UP (ref 3.8–10.5)
WBC # BLD: 10.11 K/UL — SIGNIFICANT CHANGE UP (ref 3.8–10.5)
WBC # FLD AUTO: 10.11 K/UL — SIGNIFICANT CHANGE UP (ref 3.8–10.5)
WBC # FLD AUTO: 10.11 K/UL — SIGNIFICANT CHANGE UP (ref 3.8–10.5)
WBC UR QL: >998 /HPF — HIGH (ref 0–5)
WBC UR QL: >998 /HPF — HIGH (ref 0–5)
YEAST-LIKE CELLS: PRESENT
YEAST-LIKE CELLS: PRESENT

## 2023-12-20 PROCEDURE — 87086 URINE CULTURE/COLONY COUNT: CPT

## 2023-12-20 PROCEDURE — 85027 COMPLETE CBC AUTOMATED: CPT

## 2023-12-20 PROCEDURE — 36415 COLL VENOUS BLD VENIPUNCTURE: CPT

## 2023-12-20 PROCEDURE — 87186 SC STD MICRODIL/AGAR DIL: CPT

## 2023-12-20 PROCEDURE — 96374 THER/PROPH/DIAG INJ IV PUSH: CPT | Mod: XU

## 2023-12-20 PROCEDURE — 51702 INSERT TEMP BLADDER CATH: CPT

## 2023-12-20 PROCEDURE — 99284 EMERGENCY DEPT VISIT MOD MDM: CPT

## 2023-12-20 PROCEDURE — 81001 URINALYSIS AUTO W/SCOPE: CPT

## 2023-12-20 PROCEDURE — 80048 BASIC METABOLIC PNL TOTAL CA: CPT

## 2023-12-20 PROCEDURE — 99284 EMERGENCY DEPT VISIT MOD MDM: CPT | Mod: 25

## 2023-12-20 RX ORDER — CEFTRIAXONE 500 MG/1
1000 INJECTION, POWDER, FOR SOLUTION INTRAMUSCULAR; INTRAVENOUS ONCE
Refills: 0 | Status: COMPLETED | OUTPATIENT
Start: 2023-12-20 | End: 2023-12-20

## 2023-12-20 RX ORDER — CEFPODOXIME PROXETIL 100 MG
1 TABLET ORAL
Qty: 14 | Refills: 0
Start: 2023-12-20 | End: 2023-12-26

## 2023-12-20 RX ORDER — SODIUM CHLORIDE 9 MG/ML
1000 INJECTION INTRAMUSCULAR; INTRAVENOUS; SUBCUTANEOUS ONCE
Refills: 0 | Status: COMPLETED | OUTPATIENT
Start: 2023-12-20 | End: 2023-12-20

## 2023-12-20 RX ADMIN — CEFTRIAXONE 100 MILLIGRAM(S): 500 INJECTION, POWDER, FOR SOLUTION INTRAMUSCULAR; INTRAVENOUS at 21:54

## 2023-12-20 RX ADMIN — SODIUM CHLORIDE 1000 MILLILITER(S): 9 INJECTION INTRAMUSCULAR; INTRAVENOUS; SUBCUTANEOUS at 21:44

## 2023-12-20 NOTE — ED PROVIDER NOTE - PATIENT PORTAL LINK FT
You can access the FollowMyHealth Patient Portal offered by [facility] by registering at the following website: [email or web address]. By joining [employer]’s FollowMyHealth portal, you will also be able to view your health information using other applications (apps) compatible with our system. You can access the FollowMyHealth Patient Portal offered by [facility] by registering at the following website: [email or web address]. By joining [employer]’s FollowMyHealth portal, you will also be able to view your health information using other applications (apps) compatible with our system.

## 2023-12-20 NOTE — ED ADULT NURSE NOTE - NSFALLUNIVINTERV_ED_ALL_ED
Bed/Stretcher in lowest position, wheels locked, appropriate side rails in place/Call bell, personal items and telephone in reach/Instruct patient to call for assistance before getting out of bed/chair/stretcher/Non-slip footwear applied when patient is off stretcher/[place] to call system/Physically safe environment - no spills, clutter or unnecessary equipment/Purposeful proactive rounding/Room/bathroom lighting operational, light cord in reach Bed/Stretcher in lowest position, wheels locked, appropriate side rails in place/Call bell, personal items and telephone in reach/Instruct patient to call for assistance before getting out of bed/chair/stretcher/Non-slip footwear applied when patient is off stretcher/[place] to call system/Physically safe environment - no spills, clutter or unnecessary equipment/Purposeful proactive rounding/Room/bathroom lighting operational, light cord in reach

## 2023-12-20 NOTE — ED ADULT NURSE NOTE - OBJECTIVE STATEMENT
Received patient ambulatory with chief complaint of urinary retention for 5 hours. Patient had PMH of prostate enlargement however refused surgery. Denies signs and symptoms such as fever, vomiting, chest pain/discomfort, shortness of breath, diarrhea or body weakness.  On PE, AOX4, speaking full sentences without difficulty. Patient not in active cardiac or respiratory distress, no noted neurologic deficits. With valdez inserted attach to leg bag draining red colored output. No obvious trauma/injury/deformity noted. Patient oriented to ED area. All needs attended. POC reviewed. Purposeful proactive hourly rounding in progress.

## 2023-12-20 NOTE — ED PROVIDER NOTE - CARE PLAN
1 Principal Discharge DX:	Acute UTI  Secondary Diagnosis:	Enlarged prostate with urinary retention

## 2023-12-20 NOTE — ED PROVIDER NOTE - OBJECTIVE STATEMENT
56 yo male with h/o BPH present to Er c/o pain with urination, cloudy and bloody urine for a few days. Pt mentioned he couldn't urinate today for few hours. Pt reports he has h/o BPH and was recommended to take medications but he declined. Pt states instead he will do a surgery by Dr. Carlson. Pt mentioned that he had Jacques placed for retention multiple times in the past. Last time he had Jacques about 3 month ago. Pt also mentioned that he had UTI in the past and his symptoms were similar to his recent ones. Denies fever, chills, flank pain, h/o kidney stones. 58 yo male with h/o BPH present to Er c/o pain with urination, cloudy and bloody urine for a few days. Pt mentioned he couldn't urinate today for few hours. Pt reports he has h/o BPH and was recommended to take medications but he declined. Pt states instead he will do a surgery by Dr. Carlson. Pt mentioned that he had Jacques placed for retention multiple times in the past. Last time he had Jacques about 3 month ago. Pt also mentioned that he had UTI in the past and his symptoms were similar to his recent ones. Denies fever, chills, flank pain, h/o kidney stones.

## 2023-12-20 NOTE — ED PROVIDER NOTE - CLINICAL SUMMARY MEDICAL DECISION MAKING FREE TEXT BOX
56 yo male with h/o BPH present to Er c/o pain with urination, cloudy and bloody urine for a few days. Pt mentioned he couldn't urinate today for few hours. Pt reports he has h/o BPH and was recommended to take medications but he declined. Pt states instead he will do a surgery by Dr. Carlson. Pt mentioned that he had Jacques placed for retention multiple times in the past. Last time he had Jacques about 3 month ago. Pt also mentioned that he had UTI in the past and his symptoms were similar to his recent ones. Denies fever, chills, flank pain, h/o kidney stones.  Pt afebrile and non-toxic appearing. Jacques placed and pt feels much better, soft, NTND abdomen, no flank pain, clear lungs. Urine appears cloudy. pending labs.  anticipate d/c home with oral abx and Fol;ey in place for further out pt f/u with  as scheduled. 58 yo male with h/o BPH present to Er c/o pain with urination, cloudy and bloody urine for a few days. Pt mentioned he couldn't urinate today for few hours. Pt reports he has h/o BPH and was recommended to take medications but he declined. Pt states instead he will do a surgery by Dr. Carlson. Pt mentioned that he had Jacques placed for retention multiple times in the past. Last time he had Jacques about 3 month ago. Pt also mentioned that he had UTI in the past and his symptoms were similar to his recent ones. Denies fever, chills, flank pain, h/o kidney stones.  Pt afebrile and non-toxic appearing. Jacques placed and pt feels much better, soft, NTND abdomen, no flank pain, clear lungs. Urine appears cloudy. pending labs.  anticipate d/c home with oral abx and Fol;ey in place for further out pt f/u with  as scheduled.

## 2023-12-20 NOTE — ED PROVIDER NOTE - ATTENDING APP SHARED VISIT CONTRIBUTION OF CARE
58 yo male with h/o BPH present to Er c/o pain with urination, cloudy and bloody urine for a few days. Pt mentioned he couldn't urinate today for few hours. Pt reports he has h/o BPH and was recommended to take medications but he declined. Pt states instead he will do a surgery by Dr. Carlson. Pt mentioned that he had Jacques placed for retention multiple times in the past. Last time he had Jacques about 3 month ago. Pt also mentioned that he had UTI in the past and his symptoms were similar to his recent ones. Denies fever, chills, flank pain, h/o kidney stones.  Pt afebrile and non-toxic appearing. Jacques placed and pt feels much better, soft, NTND abdomen, no flank pain, clear lungs. Urine appears cloudy. pending labs.  anticipate d/c home with oral abx and Fol;ey in place for further out pt f/u with  as scheduled.

## 2023-12-20 NOTE — ED ADULT TRIAGE NOTE - CHIEF COMPLAINT QUOTE
+urinary retention x 5 hours  PMH prostate enlargement, not taking med, declined surg, needs intermittent 14g cath

## 2023-12-20 NOTE — ED PROVIDER NOTE - CARE PROVIDERS DIRECT ADDRESSES
[email or web address]

## 2023-12-20 NOTE — ED PROVIDER NOTE - NSFOLLOWUPINSTRUCTIONS_ED_ALL_ED_FT
Thank you for visiting [facility] Emergency Department.    We saw you today for urinary retention and Jacques catheter placement.  Please know that no emergency visit is complete without follow-up with your urologist within 1 week.  Please bring copies of all discharge papers and results and show to your doctor.    Please continue taking all previous medications as instructed unless we discussed otherwise.   I appreciated your patience and hope you feel better soon.   Return to ER immediately if you develop fevers, chills, chest pain, shortness of breath, worsening dizziness, and/or any concerning symptoms. Thank you for visiting [facility] Emergency Department.    We saw you today for urinary retention and Jacques catheter placement.  Please know that no emergency visit is complete without follow-up with your urologist within 1 week.  Please bring copies of all discharge papers and results and show to your doctor.    Please continue taking all previous medications as instructed unless we discussed otherwise.   I appreciated your patience and hope you feel better soon.   Return to ER immediately if you develop fevers, chills, chest pain, shortness of breath, worsening dizziness, and/or any concerning symptoms.

## 2023-12-20 NOTE — ED ADULT NURSE REASSESSMENT NOTE - NS ED NURSE REASSESS COMMENT FT1
Patient education regarding valdez catheter insertion done and patient verbalized understanding. Valdez catheter inserted using sterile technique, draining by gravity, secured with StatLock. Second RN Bala present to confirm sterility.

## 2023-12-20 NOTE — ED PROVIDER NOTE - CARE PROVIDER_API CALL
Prashant Beauchamp.  Urology  [address]  [place] [address]  Phone: [phone]  Fax: [phone]  Follow Up Time:    Prashant Beauchamp.  Urology  [address]  [place] [address]  Phone: [phone]  Fax: [phone]  Follow Up Time:

## 2024-01-26 NOTE — ED ADULT NURSE NOTE - CINV DISCH TEACH PARTICIP
Lab Results   Component Value Date    EGFR 59 01/26/2024    EGFR 53 01/25/2024    EGFR 57 01/23/2024    CREATININE 1.04 01/26/2024    CREATININE 1.14 01/25/2024    CREATININE 1.08 01/23/2024   Cr at baseline   Avoid nephrotoxins   Patient

## 2024-03-11 NOTE — ED ADULT NURSE NOTE - OBJECTIVE STATEMENT
[Negative] : Heme/Lymph
Presents to ED for urinary retention.  Patient reports he was seen by his physician yesterday for valdez removal and was informed that he had a bacterial infection in his urine.  Patient reports also seeing pus discharge from his penis within the past few days.  He has been unable to urinate since last night.  He endorses he was supposed to begin Bactrim PO yesterday but was unable to  prescription.  Denies fevers, chills, SOB, N/V, constipation, diarrhea, CP, abd pain.

## 2024-03-30 ENCOUNTER — EMERGENCY (EMERGENCY)
Facility: HOSPITAL | Age: 59
LOS: 1 days | Discharge: HOME | End: 2024-03-30
Attending: EMERGENCY MEDICINE | Admitting: EMERGENCY MEDICINE
Payer: MEDICAID

## 2024-03-30 VITALS
OXYGEN SATURATION: 99 % | HEART RATE: 87 BPM | DIASTOLIC BLOOD PRESSURE: 80 MMHG | SYSTOLIC BLOOD PRESSURE: 159 MMHG | RESPIRATION RATE: 16 BRPM

## 2024-03-30 VITALS
HEIGHT: 72 IN | WEIGHT: 190.04 LBS | OXYGEN SATURATION: 97 % | HEART RATE: 110 BPM | RESPIRATION RATE: 16 BRPM | TEMPERATURE: 98 F | DIASTOLIC BLOOD PRESSURE: 81 MMHG | SYSTOLIC BLOOD PRESSURE: 157 MMHG

## 2024-03-30 LAB
APPEARANCE UR: ABNORMAL
BACTERIA # UR AUTO: ABNORMAL /HPF
BILIRUB UR-MCNC: NEGATIVE — SIGNIFICANT CHANGE UP
CAST: 2 /LPF — SIGNIFICANT CHANGE UP (ref 0–4)
COLOR SPEC: ABNORMAL
CULTURE RESULTS: NO GROWTH — SIGNIFICANT CHANGE UP
DIFF PNL FLD: ABNORMAL
FINE GRAN CASTS #/AREA URNS AUTO: PRESENT
GLUCOSE UR QL: NEGATIVE MG/DL — SIGNIFICANT CHANGE UP
HYALINE CASTS # UR AUTO: PRESENT
KETONES UR-MCNC: NEGATIVE MG/DL — SIGNIFICANT CHANGE UP
LEUKOCYTE ESTERASE UR-ACNC: ABNORMAL
NITRITE UR-MCNC: NEGATIVE — SIGNIFICANT CHANGE UP
PH UR: 7.5 — SIGNIFICANT CHANGE UP (ref 5–8)
PROT UR-MCNC: 100 MG/DL
RBC CASTS # UR COMP ASSIST: 1024 /HPF — HIGH (ref 0–4)
SP GR SPEC: 1 — SIGNIFICANT CHANGE UP (ref 1–1.03)
SPECIMEN SOURCE: SIGNIFICANT CHANGE UP
SQUAMOUS # UR AUTO: 2 /HPF — SIGNIFICANT CHANGE UP (ref 0–5)
UROBILINOGEN FLD QL: 1 MG/DL — SIGNIFICANT CHANGE UP (ref 0.2–1)
WBC UR QL: 78 /HPF — HIGH (ref 0–5)

## 2024-03-30 PROCEDURE — 81001 URINALYSIS AUTO W/SCOPE: CPT

## 2024-03-30 PROCEDURE — 51702 INSERT TEMP BLADDER CATH: CPT

## 2024-03-30 PROCEDURE — 99284 EMERGENCY DEPT VISIT MOD MDM: CPT

## 2024-03-30 PROCEDURE — 87086 URINE CULTURE/COLONY COUNT: CPT

## 2024-03-30 PROCEDURE — 99283 EMERGENCY DEPT VISIT LOW MDM: CPT | Mod: 25

## 2024-03-30 RX ORDER — CEFPODOXIME PROXETIL 100 MG
1 TABLET ORAL
Qty: 14 | Refills: 0
Start: 2024-03-30 | End: 2024-04-05

## 2024-03-30 NOTE — ED POST DISCHARGE NOTE - ADDITIONAL DOCUMENTATION
Pt called back stating cefpodoxime not covered by insurance and requesting cipro instead.  Cipro rx sent.

## 2024-03-30 NOTE — ED PROVIDER NOTE - OBJECTIVE STATEMENT
58-year-old undomiciled man history of BPH status post Jacques placement in December 2023 returns today complaining of continued urine infection.  Patient states that he never picked up the prescription for his UTI in December and feels infection is still present.  Patient notes cloudy colored urine with blood, which she had when he initially presented in December.  Patient denies associated fever, abdominal pain,  flank pain, nausea, vomiting.  Patient is noting urine in the bag.  Patient has not had follow-up with .

## 2024-03-30 NOTE — ED PROVIDER NOTE - PHYSICAL EXAMINATION
VITAL SIGNS: I have reviewed nursing notes and confirm.  CONSTITUTIONAL:  in no acute distress.   SKIN:  warm and dry, no acute rash.   HEAD:  normocephalic, atraumatic.  EYES: PERRL, EOM intact; conjunctiva and sclera clear.  ENT: No nasal discharge; airway clear.   NECK: Supple; non tender.  CARD: S1, S2 normal; no murmurs, gallops, or rubs. Regular rate and rhythm.   RESP:  Clear to auscultation b/l, no wheezes, rales or rhonchi.  ABD: Normal bowel sounds; soft; non-distended; non-tender; no guarding/ rebound. no cvat  MSK: Normal ROM. No clubbing, cyanosis or edema. no ttp bilat le  NEURO: Alert, oriented, grossly unremarkable  PSYCH: Cooperative, mood and affect appropriate.

## 2024-03-30 NOTE — ED ADULT TRIAGE NOTE - BSA (M2)
"  Study title: [id number]: A PHASE 1, OPEN-LABEL DOSE-FINDING STUDY TO EVALUATE SAFETY, TOLERABILITY, AND IMMUNOGENICITY AND PHASE 2/3  PLACEBO-CONTROLLED, OBSERVER-BLINDED SAFETY, TOLERABILITY, AND IMMUNOGENICITY STUDY OF A SARS-COV-2 RNA VACCINE  CANDIDATE AGAINST COVID-19 IN HEALTHY CHILDREN <12 YEARS OF AGE   IRB #: 2021.056  Sponsor: [employer]   Sponsor's Protocol: [id number]  Site Number: [id number]  Subject ID: [id number]    Potential COVID-19 Illness Visit COVID B    The participant's parent/legal guardian reported "YES" in the COVID Illness Diary and based on PI decision a POTENTIAL COVID-19 VISIT is deemed necessary.     The CRC contacted the participant's parent/legal guardian, Delon Chung, via telephone and collected the following information:    The subject has the following symptoms:    Fever: yes 101 degrees F  New or increased cough:  no  New or increased shortness of breath: no  Chills: no  New or increased muscle pain: no  New loss of taste/smell: no  Sore throat: no  Diarrhea: no  Vomiting: no  Inability to eat/poor feeding in partiicpants <5 years of age: No  Abdominal pain:  no  Hospitaliztion for a severe illness with no other altenative etiology: no  Hospitilization due to confirmed COVID-19 infection: no    Runny Nose      Symptom start date: 11 SEP 2022    Symptoms resolved? yes  IF YES, date resolved: 12 SEP 2022    Was subject instructed to collect self swab? yes  Anticipated date of collection: 16 SEP 2022  Remind subject to write subject ID on the self-swab card contained in kit.   Remind subject's parent to review the kit's expiration date prior to collecting self swab.    Did subject receive an outside COVID-19 test?  yes, at home [facility] rapid antigen test  If yes, Positive  If yes, date: 11 SEP 2022    Did the subject seek outside medical care?  no      Toxicity Grade:  2 - MODERATE: Interferes to some extent with participant's usual function.      Per protocol 2, no convalescent visit is required.  "
2.08

## 2024-03-30 NOTE — ED PROVIDER NOTE - PROGRESS NOTE DETAILS
Urine w + findings - will treat for uti although suspect pt w chronic colonization 2/2 indwelling valdez.

## 2024-03-30 NOTE — ED PROVIDER NOTE - NSFOLLOWUPINSTRUCTIONS_ED_ALL_ED_FT
UTI  Indwelling valdez    Take the antibiotic prescribed.   Return for increased pain, flank pain, nausea/vomiting, fever, any other concerns.     Leave valdez in place.     Please follow up with urology.  You may call our referrals coordinator at [phone] Monday to Friday 11am-7pm for assistance with making an appointment    Urinary Tract Infection    A urinary tract infection (UTI) is an infection of any part of the urinary tract, which includes the kidneys, ureters, bladder, and urethra. Risk factors include ignoring your need to urinate, wiping back to front if female, being an uncircumcised male, and having diabetes or a weak immune system. Symptoms include frequent urination, pain or burning with urination, foul smelling urine, cloudy urine, pain in the lower abdomen, blood in the urine, and fever. If you were prescribed an antibiotic medicine, take it as told by your health care provider. Do not stop taking the antibiotic even if you start to feel better.    SEEK IMMEDIATE MEDICAL CARE IF YOU HAVE ANY OF THE FOLLOWING SYMPTOMS: severe back or abdominal pain, fever, inability to keep fluids or medicine down, dizziness/lightheadedness, or a change in mental status. UTI  Indwelling valdez    Take the antibiotic prescribed.   Return for increased pain, flank pain, nausea/vomiting, fever, any other concerns.     Leave valdez in place.     Please follow up with Dr. Beauchamp (or any urologist of your choosing).  You may call our referrals coordinator at [phone] Monday to Friday 11am-7pm for assistance with making an appointment    Urinary Tract Infection    A urinary tract infection (UTI) is an infection of any part of the urinary tract, which includes the kidneys, ureters, bladder, and urethra. Risk factors include ignoring your need to urinate, wiping back to front if female, being an uncircumcised male, and having diabetes or a weak immune system. Symptoms include frequent urination, pain or burning with urination, foul smelling urine, cloudy urine, pain in the lower abdomen, blood in the urine, and fever. If you were prescribed an antibiotic medicine, take it as told by your health care provider. Do not stop taking the antibiotic even if you start to feel better.    SEEK IMMEDIATE MEDICAL CARE IF YOU HAVE ANY OF THE FOLLOWING SYMPTOMS: severe back or abdominal pain, fever, inability to keep fluids or medicine down, dizziness/lightheadedness, or a change in mental status.

## 2024-03-30 NOTE — ED ADULT NURSE NOTE - OBJECTIVE STATEMENT
pt with urinary tract symptoms, Jacques in place, last time changed 2 months ago, hx urinary retention, prostate problem

## 2024-03-30 NOTE — ED PROVIDER NOTE - CARE PROVIDER_API CALL
Prashant Beauchamp.  Urology  [address]  [place] [address]  Phone: [phone]  Fax: [phone]  Follow Up Time:

## 2024-03-30 NOTE — ED ADULT NURSE NOTE - DISCHARGE DATE/TIME
30-Mar-2024 09:02 V-Y Plasty Text: The defect edges were debeveled with a #15 scalpel blade.  Given the location of the defect, shape of the defect and the proximity to free margins an V-Y advancement flap was deemed most appropriate.  Using a sterile surgical marker, an appropriate advancement flap was drawn incorporating the defect and placing the expected incisions within the relaxed skin tension lines where possible.    The area thus outlined was incised deep to adipose tissue with a #15 scalpel blade.  The skin margins were undermined to an appropriate distance in all directions utilizing iris scissors.

## 2024-03-30 NOTE — ED PROVIDER NOTE - PATIENT PORTAL LINK FT
You can access the FollowMyHealth Patient Portal offered by [facility] by registering at the following website: [email or web address]. By joining [employer]’s FollowMyHealth portal, you will also be able to view your health information using other applications (apps) compatible with our system.

## 2024-03-30 NOTE — ED PROVIDER NOTE - CLINICAL SUMMARY MEDICAL DECISION MAKING FREE TEXT BOX
Pt c/o ongoing uti after noncompliance w rx from Dec and indwelling catheter.  Pt notes cloudy urine w blood; no abd/flank pain, fever, cvat, n/v.  Suspect pt likely w chronic colonization rather than acute uti.  Plan ua after valdez change; likely dc to fu w .

## 2024-04-01 DIAGNOSIS — R31.9 HEMATURIA, UNSPECIFIED: ICD-10-CM

## 2024-04-01 DIAGNOSIS — N39.0 URINARY TRACT INFECTION, SITE NOT SPECIFIED: ICD-10-CM

## 2024-04-01 DIAGNOSIS — Z59.00 HOMELESSNESS UNSPECIFIED: ICD-10-CM

## 2024-04-01 DIAGNOSIS — T83.511A INFECTION AND INFLAMMATORY REACTION DUE TO INDWELLING URETHRAL CATHETER, INITIAL ENCOUNTER: ICD-10-CM

## 2024-04-01 SDOH — ECONOMIC STABILITY - HOUSING INSECURITY: HOMELESSNESS UNSPECIFIED: Z59.00

## 2024-04-24 NOTE — ED PROVIDER NOTE - CPE EDP NEURO NORM
You were seen in the ENT Clinic today by Dr. Boo. If you have any questions or concerns after your appointment, please contact us (see below)    The following has been recommended for you based upon your appointment today:  surgery    Please return to clinic for post op appts    How to Contact Us:  Send a [employer] message to your provider. Our team will respond to you via [employer]. Occasionally, we will need to call you to get further information.  For urgent matters (Monday-Friday), call the ENT Clinic: [phone] and speak with a call center team member - they will route your call appropriately.   If you'd like to speak directly with a nurse, please find our contact information below. We do our best to check voicemail frequently throughout the day, and will work to call you back within 1-2 days. For urgent matters, please use the general clinic phone numbers listed above.    Dai MORROW RN, BSN   RN Care Coordinator, ENT Clinic  [facility]  Direct: [phone]  Kadie MANRIQUE LPN  Direct: [phone]          
normal...

## 2024-06-11 ENCOUNTER — EMERGENCY (EMERGENCY)
Facility: HOSPITAL | Age: 59
LOS: 1 days | Discharge: HOME | End: 2024-06-11
Attending: EMERGENCY MEDICINE | Admitting: EMERGENCY MEDICINE
Payer: MEDICAID

## 2024-06-11 VITALS
DIASTOLIC BLOOD PRESSURE: 84 MMHG | RESPIRATION RATE: 16 BRPM | HEART RATE: 113 BPM | TEMPERATURE: 98 F | SYSTOLIC BLOOD PRESSURE: 143 MMHG | OXYGEN SATURATION: 97 %

## 2024-06-11 PROCEDURE — 51702 INSERT TEMP BLADDER CATH: CPT

## 2024-06-11 PROCEDURE — 99283 EMERGENCY DEPT VISIT LOW MDM: CPT | Mod: 25

## 2024-06-11 PROCEDURE — 99284 EMERGENCY DEPT VISIT MOD MDM: CPT

## 2024-06-11 RX ORDER — CIPROFLOXACIN LACTATE 400MG/40ML
1 VIAL (ML) INTRAVENOUS
Qty: 10 | Refills: 0
Start: 2024-06-11 | End: 2024-06-15

## 2024-06-11 NOTE — ED PROVIDER NOTE - NSFOLLOWUPINSTRUCTIONS_ED_ALL_ED_FT
Indwelling Urinary Catheter Bag Care, Adult  Lower part of the body, showing a catheter going from the bladder to a drainage bag outside the body.  An indwelling urinary catheter is a soft tube that is placed into the bladder to help drain pee (urine) out of the body. The catheter is put in through the urethra and is held inside your bladder by a balloon filled with water. The urethra is the part of the body that drains pee from the bladder.    Pee drains from the catheter into a drainage bag outside of the body. Taking good care of your catheter, catheter tubing, and the drainage bag will keep your catheter working well. It will also help prevent problems like urinary tract infections (UTIs).    What are the risks?  Germs (bacteria) may get into your bladder and cause a UTI.  The flow of pee can become blocked. This can happen if:  The catheter is not connected to the drainage bag correctly.  You have sediment or a blood clot in your bladder or catheter.  How to wear the catheter and drainage bag  Supplies needed:    Adhesive tape or a leg strap.  If you use tape you will need:  Alcohol wipes or soap and water.  A clean towel.  Overnight drainage bag.  Smaller drainage bag (leg bag).  Wearing your catheter and bag    A drainage bag attached to a person's leg. A close-up shows the catheter going from the bladder to the leg bag.  Use tape or a leg strap to attach the catheter and tubing to your leg.  Make sure the catheter is not pulled tight.  If a leg strap gets wet, replace it with a dry one.  If you use tape:  Use an alcohol wipe or soap and water to wash off any stickiness on your skin where you had tape before.  Use a clean towel to pat-dry the area.  Apply the new tape.  You should have received a large overnight drainage bag and a smaller leg bag that fits under clothing.  You may wear the overnight bag at any time, but you should not wear the leg bag at night.  Make sure the overnight drainage bag is always lower than the level of your bladder. But do not let the bag touch the floor.  Before you go to sleep, hang the bag on the side of a wastebasket that is covered by a clean plastic bag.  Secure the leg bag according to the 's instructions. This may be above or below the knee, depending on the length of the tubing. Make sure that:  The leg bag is below your bladder.  The tubing does not have loops or too much tension.  How to empty the drainage bag  Supplies needed:    Rubbing alcohol or alcohol wipes.  Gauze pad or cotton ball.  Toilet or a clean container.  Emptying the bag    Empty your drainage bag when it is ?–½ full, or at least 2–3 times a day. Clean the bag according to the 's instructions or as told by your health care provider.  Wash your hands with soap and water for at least 20 seconds. If soap and water are not available, use hand .  Hold the drainage bag over the toilet or the clean container used with the drainage bag. Make sure the drainage bag is lower than your hips and bladder. This stops pee from going back into the tubing and into your bladder.  Open the pour spout at the bottom of the bag.  Empty the pee into the toilet or container. Do not let the pour spout touch any surface. This prevents germs from getting in the bag and causing infection.  Use the gauze pad or cotton ball soaked with rubbing alcohol or an alcohol wipe to clean the pour spout.  Close the pour spout.  Wash your hands again with soap and water for at least 20 seconds.  How to change the drainage bag  Supplies needed:    Alcohol wipes.  A new drainage bag.  Adhesive tape or a leg strap.  Changing the bag    Change your drainage bag twice a day. Also replace your drainage bag with a new bag if it leaks, starts to smell bad, or looks dirty. Be sure to empty your drainage bag before you change bags.  Wash your hands with soap and water for at least 20 seconds. If soap and water are not available, use hand .  Detach the tubing or drainage bag from your skin.  Pinch the catheter with your fingers so that pee does not spill out.  Disconnect the catheter from the drainage bag tubing at the connection valve. Do not let the end of the catheter touch any surface.  Use an alcohol wipe to clean the end of the catheter and the end of the drainage bag tubing being connected.  Connect the catheter to the drainage bag tubing.  Attach the bag and tubing to your leg with tape or a leg strap. Avoid attaching the bag too tightly.  Wash your hands again with soap and water for at least 20 seconds.  General instructions  A person washing hands with soap and water.  Always wash your hands for at least 20 seconds before and after you handle your catheter or drainage bag. Use a mild, fragrance-free soap.  Never pull on your catheter or try to remove it. Pulling can damage your internal tissues.  Always make sure there are no leaks around the catheter or from the drainage bag and tubing.  Always make sure there are no twists, bends, or kinks in the catheter or the tubing.  Drink enough fluid to keep your pee pale yellow.  Do not take baths, swim, or use a hot tub.  If you are female, wipe from front to back after having a bowel movement.  Contact a health care provider if:  Your catheter starts to leak.  Your bladder feels full.  You have tiredness (lethargy), excessive sleepiness, or confusion.  You have signs of a UTI, such as:  A fever or chills.  Pain in your abdomen, legs, lower back, or bladder.  Get help right away if:  Your pee is pink or red or you see blood in the catheter.  Your pee is not draining into the bag.  Your catheter gets pulled out.  This information is not intended to replace advice given to you by your health care provider. Make sure you discuss any questions you have with your health care provider.

## 2024-06-11 NOTE — ED PROVIDER NOTE - CLINICAL SUMMARY MEDICAL DECISION MAKING FREE TEXT BOX
57 y/o m hx BPH with indwelling valdez presents c/o foul smelling urine tinged with blood from valdez over the past few days.  Pt afebrile in ED, catheter changed, will d/c on abx, f/u urology.

## 2024-06-11 NOTE — ED ADULT NURSE NOTE - CAS EDP DISCH TYPE
For pending appointment with plantar warts-[facility] called, 15g BLT cream called in for patient.  I advised patient to apply 2 hours before his appointment next Monday and wrap feet with Saran wrap.   Home

## 2024-06-11 NOTE — ED ADULT TRIAGE NOTE - CHIEF COMPLAINT QUOTE
Pt. presents to the ED stating that he needs his Jcaques catheter changed. Pt. reports foul smelling urine as well. Has not been replaced for 1-2 months.

## 2024-06-11 NOTE — ED ADULT NURSE NOTE - NSFALLUNIVINTERV_ED_ALL_ED
Bed/Stretcher in lowest position, wheels locked, appropriate side rails in place/Call bell, personal items and telephone in reach/Instruct patient to call for assistance before getting out of bed/chair/stretcher/Non-slip footwear applied when patient is off stretcher/[place] to call system/Physically safe environment - no spills, clutter or unnecessary equipment/Purposeful proactive rounding/Room/bathroom lighting operational, light cord in reach

## 2024-06-11 NOTE — ED ADULT NURSE NOTE - CHIEF COMPLAINT QUOTE
Pt. presents to the ED stating that he needs his Jacques catheter changed. Pt. reports foul smelling urine as well. Has not been replaced for 1-2 months.

## 2024-06-11 NOTE — ED PROVIDER NOTE - ATTENDING APP SHARED VISIT CONTRIBUTION OF CARE
58 BPH intermittent retention requiring valdez now with suspected urine infection due to valdez with new foul smelling urine x days, no fever or chills.  Denies ab pain or flank pain.  Looks well, VSS, valdez in place draining, dark urine, belly soft nontender, no CVAT.  Plan urinalysis, replace valdez, culture, likely tx.

## 2024-06-11 NOTE — ED PROVIDER NOTE - PATIENT PORTAL LINK FT
You can access the FollowMyHealth Patient Portal offered by [facility] by registering at the following website: [email or web address]. By joining [employer]’s FollowMyHealth portal, you will also be able to view your health information using other applications (apps) compatible with our system.

## 2024-06-11 NOTE — ED PROVIDER NOTE - OBJECTIVE STATEMENT
59 y/o m hx BPH with indwelling valdez presents c/o foul smelling urine in his valdez for the past few days, also tinged with blood.  Pt stating in the past this has been signs of an infection, thinks he needs abx and asking for the valdez to be changed.  Pt denies fever, chills, abd pain, flank pain, vomiting, all other ROS negative.

## 2024-06-13 DIAGNOSIS — R82.998 OTHER ABNORMAL FINDINGS IN URINE: ICD-10-CM

## 2024-06-13 DIAGNOSIS — N39.0 URINARY TRACT INFECTION, SITE NOT SPECIFIED: ICD-10-CM

## 2024-06-13 DIAGNOSIS — N40.0 BENIGN PROSTATIC HYPERPLASIA WITHOUT LOWER URINARY TRACT SYMPTOMS: ICD-10-CM

## 2024-06-26 NOTE — ED ADULT TRIAGE NOTE - SOURCE OF INFORMATION
Patient requested Prednisone refill. No prescriber. Sent message to PCP to address  
Received refill request for atorvastatin 80mg,furosemide 40mg,pantoprazole 40mg from [facility] pharmacy.     Last OV: 05/13/24 DKW    Next OV: 12/03/24 DKW    Last Labs: 6/9/24 lipid 6/8/24 cmp    Last Filled: 6/12/24   
Patient

## 2024-07-30 ENCOUNTER — EMERGENCY (EMERGENCY)
Facility: HOSPITAL | Age: 59
LOS: 1 days | Discharge: HOME | End: 2024-07-30
Attending: EMERGENCY MEDICINE | Admitting: EMERGENCY MEDICINE
Payer: MEDICAID

## 2024-07-30 VITALS
OXYGEN SATURATION: 95 % | WEIGHT: 190.04 LBS | TEMPERATURE: 98 F | DIASTOLIC BLOOD PRESSURE: 88 MMHG | SYSTOLIC BLOOD PRESSURE: 144 MMHG | HEART RATE: 93 BPM | RESPIRATION RATE: 16 BRPM

## 2024-07-30 VITALS
RESPIRATION RATE: 17 BRPM | SYSTOLIC BLOOD PRESSURE: 158 MMHG | TEMPERATURE: 98 F | OXYGEN SATURATION: 99 % | DIASTOLIC BLOOD PRESSURE: 84 MMHG | HEART RATE: 78 BPM

## 2024-07-30 DIAGNOSIS — T83.9XXA UNSPECIFIED COMPLICATION OF GENITOURINARY PROSTHETIC DEVICE, IMPLANT AND GRAFT, INITIAL ENCOUNTER: ICD-10-CM

## 2024-07-30 LAB
ANION GAP SERPL CALC-SCNC: 9 MMOL/L — SIGNIFICANT CHANGE UP (ref 5–17)
APPEARANCE UR: CLEAR — SIGNIFICANT CHANGE UP
BACTERIA # UR AUTO: NEGATIVE /HPF — SIGNIFICANT CHANGE UP
BASOPHILS # BLD AUTO: 0.03 K/UL — SIGNIFICANT CHANGE UP (ref 0–0.2)
BASOPHILS NFR BLD AUTO: 0.6 % — SIGNIFICANT CHANGE UP (ref 0–2)
BILIRUB UR-MCNC: NEGATIVE — SIGNIFICANT CHANGE UP
BUN SERPL-MCNC: 12 MG/DL — SIGNIFICANT CHANGE UP (ref 7–23)
CALCIUM SERPL-MCNC: 9.4 MG/DL — SIGNIFICANT CHANGE UP (ref 8.4–10.5)
CAST: 0 /LPF — SIGNIFICANT CHANGE UP (ref 0–4)
CHLORIDE SERPL-SCNC: 102 MMOL/L — SIGNIFICANT CHANGE UP (ref 96–108)
CO2 SERPL-SCNC: 27 MMOL/L — SIGNIFICANT CHANGE UP (ref 22–31)
COLOR SPEC: YELLOW — SIGNIFICANT CHANGE UP
CREAT SERPL-MCNC: 1.02 MG/DL — SIGNIFICANT CHANGE UP (ref 0.5–1.3)
DIFF PNL FLD: ABNORMAL
EGFR: 85 ML/MIN/1.73M2 — SIGNIFICANT CHANGE UP
EOSINOPHIL # BLD AUTO: 0.09 K/UL — SIGNIFICANT CHANGE UP (ref 0–0.5)
EOSINOPHIL NFR BLD AUTO: 1.7 % — SIGNIFICANT CHANGE UP (ref 0–6)
GLUCOSE SERPL-MCNC: 88 MG/DL — SIGNIFICANT CHANGE UP (ref 70–99)
GLUCOSE UR QL: NEGATIVE MG/DL — SIGNIFICANT CHANGE UP
HCT VFR BLD CALC: 38.3 % — LOW (ref 39–50)
HGB BLD-MCNC: 12.9 G/DL — LOW (ref 13–17)
IMM GRANULOCYTES NFR BLD AUTO: 0.2 % — SIGNIFICANT CHANGE UP (ref 0–0.9)
KETONES UR-MCNC: NEGATIVE MG/DL — SIGNIFICANT CHANGE UP
LEUKOCYTE ESTERASE UR-ACNC: ABNORMAL
LYMPHOCYTES # BLD AUTO: 1.61 K/UL — SIGNIFICANT CHANGE UP (ref 1–3.3)
LYMPHOCYTES # BLD AUTO: 30.4 % — SIGNIFICANT CHANGE UP (ref 13–44)
MCHC RBC-ENTMCNC: 29.6 PG — SIGNIFICANT CHANGE UP (ref 27–34)
MCHC RBC-ENTMCNC: 33.7 GM/DL — SIGNIFICANT CHANGE UP (ref 32–36)
MCV RBC AUTO: 87.8 FL — SIGNIFICANT CHANGE UP (ref 80–100)
MONOCYTES # BLD AUTO: 0.43 K/UL — SIGNIFICANT CHANGE UP (ref 0–0.9)
MONOCYTES NFR BLD AUTO: 8.1 % — SIGNIFICANT CHANGE UP (ref 2–14)
NEUTROPHILS # BLD AUTO: 3.12 K/UL — SIGNIFICANT CHANGE UP (ref 1.8–7.4)
NEUTROPHILS NFR BLD AUTO: 59 % — SIGNIFICANT CHANGE UP (ref 43–77)
NITRITE UR-MCNC: NEGATIVE — SIGNIFICANT CHANGE UP
NRBC # BLD: 0 /100 WBCS — SIGNIFICANT CHANGE UP (ref 0–0)
PH UR: 6 — SIGNIFICANT CHANGE UP (ref 5–8)
PLATELET # BLD AUTO: 144 K/UL — LOW (ref 150–400)
POTASSIUM SERPL-MCNC: 4 MMOL/L — SIGNIFICANT CHANGE UP (ref 3.5–5.3)
POTASSIUM SERPL-SCNC: 4 MMOL/L — SIGNIFICANT CHANGE UP (ref 3.5–5.3)
PROT UR-MCNC: 30 MG/DL
RBC # BLD: 4.36 M/UL — SIGNIFICANT CHANGE UP (ref 4.2–5.8)
RBC # FLD: 12.1 % — SIGNIFICANT CHANGE UP (ref 10.3–14.5)
RBC CASTS # UR COMP ASSIST: 14 /HPF — HIGH (ref 0–4)
SODIUM SERPL-SCNC: 138 MMOL/L — SIGNIFICANT CHANGE UP (ref 135–145)
SP GR SPEC: <1.005 — LOW (ref 1–1.03)
SQUAMOUS # UR AUTO: 1 /HPF — SIGNIFICANT CHANGE UP (ref 0–5)
UROBILINOGEN FLD QL: 1 MG/DL — SIGNIFICANT CHANGE UP (ref 0.2–1)
WBC # BLD: 5.29 K/UL — SIGNIFICANT CHANGE UP (ref 3.8–10.5)
WBC # FLD AUTO: 5.29 K/UL — SIGNIFICANT CHANGE UP (ref 3.8–10.5)
WBC UR QL: 8 /HPF — HIGH (ref 0–5)

## 2024-07-30 PROCEDURE — 51702 INSERT TEMP BLADDER CATH: CPT

## 2024-07-30 PROCEDURE — 81001 URINALYSIS AUTO W/SCOPE: CPT

## 2024-07-30 PROCEDURE — 85025 COMPLETE CBC W/AUTO DIFF WBC: CPT

## 2024-07-30 PROCEDURE — 99283 EMERGENCY DEPT VISIT LOW MDM: CPT | Mod: 25

## 2024-07-30 PROCEDURE — 87086 URINE CULTURE/COLONY COUNT: CPT

## 2024-07-30 PROCEDURE — 80048 BASIC METABOLIC PNL TOTAL CA: CPT

## 2024-07-30 PROCEDURE — 99284 EMERGENCY DEPT VISIT MOD MDM: CPT

## 2024-07-30 PROCEDURE — 36415 COLL VENOUS BLD VENIPUNCTURE: CPT

## 2024-07-30 NOTE — ED PROVIDER NOTE - NSFOLLOWUPINSTRUCTIONS_ED_ALL_ED_FT
Thank you for visiting [facility] Emergency Department.      We saw you today for a valdez catheter exchange.    Please follow up with urology in 1 week for re-evaluation.   Please know that no emergency visit is complete without follow-up with your primary care provider in 1 week.  Please bring copies of all discharge papers and results and show to your doctor.      Please continue taking all previous medications as instructed unless we discussed otherwise.     I appreciated your patience and hope you feel better soon.     Return to ER immediately if you develop fevers, chills, chest pain, shortness of breath, worsening and/or any concerning symptoms.

## 2024-07-30 NOTE — ED PROVIDER NOTE - ATTENDING APP SHARED VISIT CONTRIBUTION OF CARE
57 yo chronic indwelling valdez last replaced 2 mo ago now with foul odor to urine with occasional blood.  NO back pain, fever, ab pain.  VSS.  Urine appears mildly cloudy.  Belly soft nontender.  Labs noted and reassurring.  Doubt acute infection.  Valdez replaced and culture sent.  Plan DC and outpt urology.

## 2024-07-30 NOTE — ED PROVIDER NOTE - PHYSICAL EXAMINATION
CONSTITUTIONAL: Awake, alert.  Nontoxic, no acute distress.    HEAD: Normocephalic, atraumatic.    EYES: Conjunctivae clear without exudates or hemorrhage. Sclera is non-icteric.    ENT: Normal appearing external ears, nose, mucous membranes moist.    NECK: supple, trachea midline.    HEART:  Normal rate, regular rhythm.  Heart sounds S1, S2.  No murmurs, rubs or gallops.    LUNGS:  No acute respiratory distress.  Non-tachypneic and non-labored.  Lungs are clear bilaterally with good aeration.  No wheezing, rales, rhonchi.    ABDOMEN: Normal appearing skin without lesions, rashes.  Normal bowel sounds x 4.  Soft, non-distended, non-tender in all four quadrants. No rebound or guarding. No hernias or masses palpable.  No pulsatile abdominal mass.   No CVA tenderness b/l.    GENITAL: +valdez present, no active bleeding    MUSCULOSKELETAL:  Moving all extremities without issue.    SKIN: Skin in warm, dry and intact without rashes or lesions.  Appropriate color for ethnicity.    NEUROLOGICAL:  Patient is alert, oriented x person, place and time.    PSYCH: Appropriate mood and affect. Good judgment and insight.

## 2024-07-30 NOTE — ED PROVIDER NOTE - OBJECTIVE STATEMENT
59 y/o male w hx chronic indwelling valdez presents to ED requesting valdez catheter change and c/f infection.  States has had this valdez in for 2 mo.  States over past 2 wks has noticed intermittent hematuria and foul smell to urine.  Denies f/c, abd pain, n/v/d.  Has not followed up with uro recently.

## 2024-07-30 NOTE — ED ADULT NURSE NOTE - NSFALLUNIVINTERV_ED_ALL_ED
Bed/Stretcher in lowest position, wheels locked, appropriate side rails in place/Call bell, personal items and telephone in reach/Instruct patient to call for assistance before getting out of bed/chair/stretcher/Non-slip footwear applied when patient is off stretcher/[place] to call system/Physically safe environment - no spills, clutter or unnecessary equipment/Purposeful proactive rounding/Room/bathroom lighting operational, light cord in reach

## 2024-07-30 NOTE — ED ADULT NURSE NOTE - OBJECTIVE STATEMENT
Pt. a&ox4 ambulatory, hx of BPH with chronic need for valdez placement, had placed last in the ED 2 weeks ago, comes back for a new valdez to be placed, as he has noticed cloudy foul smelling urine draining from the catheter. Denies f/c, dysuria, decreased output or feeling of bladder pressure / any obstruction. Pt. states this happens occasionally to him, is a pt of [facility], but usually comes here to have valdez changed.

## 2024-07-30 NOTE — ED PROVIDER NOTE - CLINICAL SUMMARY MEDICAL DECISION MAKING FREE TEXT BOX
57 y/o male w hx chronic indwelling valdez presents to ED requesting valdez catheter change and c/f infection.  States has had this valdez in for 2 mo.  States over past 2 wks has noticed intermittent hematuria and foul smell to urine.  Denies f/c, abd pain, n/v/d.  Has not followed up with uro recently.  Exam grossly unrevealing  Plan for labs, UA, urine cx  Will exchange valdez  DC with uro f/u  --  Urine appears likely chronically colonized, will hold on abx for cx  Will DC with leg bag

## 2024-07-30 NOTE — ED PROVIDER NOTE - PATIENT PORTAL LINK FT
You can access the FollowMyHealth Patient Portal offered by [facility] by registering at the following website: [email or web address]. By joining [employer]’s FollowMyHealth portal, you will also be able to view your health information using other applications (apps) compatible with our system.

## 2024-07-31 LAB
CULTURE RESULTS: SIGNIFICANT CHANGE UP
SPECIMEN SOURCE: SIGNIFICANT CHANGE UP

## 2024-08-16 NOTE — ED PROVIDER NOTE - CARE PLAN
Check labs    Pneumonia vaccines:  refused    Shingles vaccines:  refused    Colonoscopy:  Wants to think about doing the colonoscopy or the stool test    Refused the covid test      Principal Discharge DX:	BPH (benign prostatic hyperplasia)  Secondary Diagnosis:	Urinary retention

## 2024-09-24 ENCOUNTER — EMERGENCY (EMERGENCY)
Facility: HOSPITAL | Age: 59
LOS: 1 days | Discharge: HOME | End: 2024-09-24
Attending: EMERGENCY MEDICINE | Admitting: EMERGENCY MEDICINE
Payer: MEDICAID

## 2024-09-24 VITALS
RESPIRATION RATE: 18 BRPM | OXYGEN SATURATION: 98 % | SYSTOLIC BLOOD PRESSURE: 133 MMHG | TEMPERATURE: 98 F | DIASTOLIC BLOOD PRESSURE: 84 MMHG | HEART RATE: 80 BPM

## 2024-09-24 VITALS
TEMPERATURE: 99 F | WEIGHT: 190.04 LBS | RESPIRATION RATE: 18 BRPM | DIASTOLIC BLOOD PRESSURE: 79 MMHG | SYSTOLIC BLOOD PRESSURE: 143 MMHG | HEART RATE: 100 BPM | HEIGHT: 72 IN | OXYGEN SATURATION: 98 %

## 2024-09-24 DIAGNOSIS — N40.0 BENIGN PROSTATIC HYPERPLASIA WITHOUT LOWER URINARY TRACT SYMPTOMS: ICD-10-CM

## 2024-09-24 DIAGNOSIS — T83.9XXA UNSPECIFIED COMPLICATION OF GENITOURINARY PROSTHETIC DEVICE, IMPLANT AND GRAFT, INITIAL ENCOUNTER: ICD-10-CM

## 2024-09-24 LAB
APPEARANCE UR: CLEAR — SIGNIFICANT CHANGE UP
BACTERIA # UR AUTO: ABNORMAL /HPF
BILIRUB UR-MCNC: NEGATIVE — SIGNIFICANT CHANGE UP
CAST: 0 /LPF — SIGNIFICANT CHANGE UP (ref 0–4)
COLOR SPEC: YELLOW — SIGNIFICANT CHANGE UP
DIFF PNL FLD: ABNORMAL
GLUCOSE UR QL: NEGATIVE MG/DL — SIGNIFICANT CHANGE UP
KETONES UR-MCNC: NEGATIVE MG/DL — SIGNIFICANT CHANGE UP
LEUKOCYTE ESTERASE UR-ACNC: ABNORMAL
NITRITE UR-MCNC: NEGATIVE — SIGNIFICANT CHANGE UP
PH UR: 7.5 — SIGNIFICANT CHANGE UP (ref 5–8)
PROT UR-MCNC: 30 MG/DL
RBC CASTS # UR COMP ASSIST: 5 /HPF — HIGH (ref 0–4)
SP GR SPEC: 1.01 — SIGNIFICANT CHANGE UP (ref 1–1.03)
SPERM AB SPEC-ACNC: PRESENT
SQUAMOUS # UR AUTO: 2 /HPF — SIGNIFICANT CHANGE UP (ref 0–5)
UROBILINOGEN FLD QL: 0.2 MG/DL — SIGNIFICANT CHANGE UP (ref 0.2–1)
WBC UR QL: 25 /HPF — HIGH (ref 0–5)

## 2024-09-24 PROCEDURE — 99284 EMERGENCY DEPT VISIT MOD MDM: CPT

## 2024-09-24 PROCEDURE — 51702 INSERT TEMP BLADDER CATH: CPT

## 2024-09-24 PROCEDURE — 87186 SC STD MICRODIL/AGAR DIL: CPT

## 2024-09-24 PROCEDURE — 87086 URINE CULTURE/COLONY COUNT: CPT

## 2024-09-24 PROCEDURE — 87077 CULTURE AEROBIC IDENTIFY: CPT

## 2024-09-24 PROCEDURE — 99283 EMERGENCY DEPT VISIT LOW MDM: CPT | Mod: 25

## 2024-09-24 PROCEDURE — 81001 URINALYSIS AUTO W/SCOPE: CPT

## 2024-09-24 NOTE — ED ADULT NURSE NOTE - NSFALLUNIVINTERV_ED_ALL_ED
Bed/Stretcher in lowest position, wheels locked, appropriate side rails in place/Call bell, personal items and telephone in reach/Instruct patient to call for assistance before getting out of bed/chair/stretcher/Non-slip footwear applied when patient is off stretcher/[place] to call system/Physically safe environment - no spills, clutter or unnecessary equipment/Purposeful proactive rounding/Room/bathroom lighting operational, light cord in reach

## 2024-09-24 NOTE — ED PROVIDER NOTE - CLINICAL SUMMARY MEDICAL DECISION MAKING FREE TEXT BOX
57 y/o male w/ hx chronic indwelling valdez for BPH presents to ED requesting valdez catheter change.  States last changed 2 mo ago (seen in ED by me 7/30/24).  Has not f/u with urology 2/2 work schedule.  Feels well.  Denies f/c, abd pain, flank pain, blood in urine, foul smelling urine.  Long discussion had with pt regarding ED use for valdez exchange and need for close f/u with uro.  D/w him needs trial of void, prefers to keep valdez for time being.  Will check urine for signs infx and change valdez  Given uro f/u

## 2024-09-24 NOTE — ED PROVIDER NOTE - ATTENDING APP SHARED VISIT CONTRIBUTION OF CARE
59 y/o male w/ hx chronic indwelling valdez for BPH presents to ED requesting valdez catheter change.  States last changed 2 mo ago (seen in ED by PA Hellerman 7/30/24).  Has not f/u with urology 2/2 work schedule.  Feels well.  Denies f/c, abd pain, flank pain, blood in urine, foul smelling urine.

## 2024-09-24 NOTE — ED ADULT NURSE REASSESSMENT NOTE - NS ED NURSE REASSESS COMMENT FT1
Assumed care of pt. Pt here for valdez catheter change. Valdez changed by last shift RN. Valdez draining clear pale urine 275mL. Leg bag placed by me. Awaiting urine lab results for NANCY.

## 2024-09-24 NOTE — ED ADULT NURSE NOTE - HOW OFTEN DO YOU HAVE A DRINK CONTAINING ALCOHOL?
82yFemale hx afib on xarelto, macular degeneration, pancreatic enzyme deficiency, c/o L hip pain s/p mechanical fall. Now s/p Hemiarthroplasty of left hip Never

## 2024-09-24 NOTE — ED PROVIDER NOTE - NS ED ROS FT
CONSTITUTIONAL: Denies fever and chills    RESPIRATORY: Denies SOB    CARDIOVASCULAR: Denies chest pain.    GASTROINTESTINAL: Denies abdominal pain, nausea, vomiting and diarrhea.    GENITOURINARY: See HPI

## 2024-09-24 NOTE — ED ADULT TRIAGE NOTE - CHIEF COMPLAINT QUOTE
noticed small amount of blood in urinary catheter x few days , also reports some pus   stating "my catheter needs to be changed" - reports its been 1 and 1/2 months since last changed

## 2024-09-24 NOTE — ED PROVIDER NOTE - PATIENT PORTAL LINK FT
You can access the FollowMyHealth Patient Portal offered by [facility] by registering at the following website: [email or web address]. By joining [employer]’s FollowMyHealth portal, you will also be able to view your health information using other applications (apps) compatible with our system.

## 2024-09-24 NOTE — ED PROVIDER NOTE - PHYSICAL EXAMINATION
CONSTITUTIONAL: Awake, alert.  Nontoxic, no acute distress.    HEAD: Normocephalic, atraumatic.    EYES: Conjunctivae clear without exudates or hemorrhage. Sclera is non-icteric.    ENT: Normal appearing external ears, nose, mucous membranes moist.    NECK: supple, trachea midline.    HEART:  Normal rate    LUNGS:  No acute respiratory distress.  Non-tachypneic and non-labored.      ABDOMEN: Normal appearing skin without lesions, rashes.  Normal bowel sounds x 4.  Soft, non-distended, non-tender in all four quadrants. No rebound or guarding. No hernias or masses palpable.  No pulsatile abdominal mass.   No CVA tenderness b/l.    GENITAL: Normal appearing circumcised penis with valdez present. No penile discharge or lesions. No scrotal swelling or discoloration.     MUSCULOSKELETAL:  Moving all extremities without issue.    SKIN: Skin in warm, dry and intact without rashes or lesions.  Appropriate color for ethnicity.    NEUROLOGICAL:  Patient is alert, oriented x person, place and time.    PSYCH: Appropriate mood and affect. Good judgment and insight.

## 2024-09-24 NOTE — ED ADULT NURSE NOTE - FINAL NURSING ELECTRONIC SIGNATURE
Patient Instructions by Blanka Pulliam PA-C at 10/18/18 02:19 PM     Author:  Blanka Pulliam PA-C Service:  (none) Author Type:  Physician Assistant     Filed:  10/18/18 02:19 PM Encounter Date:  10/18/2018 Status:  Signed     :  Blanka Pulliam PA-C (Physician Assistant)            Assessment/Plan:  Sty of the L upper eyelid  Tobramycin 0.3% ointment one drop every 4 hours while awake for 5 days  Can use dry warm compress for about 10 minutes few times daily    Suspect UTI  Will check urine today  Will call with results      Revision History        User Key Date/Time User Provider Type Action    > [N/A] 10/18/18 02:19 PM Blanka Pulliam PA-C Physician Assistant Sign             24-Sep-2024 10:07

## 2024-09-24 NOTE — ED PROVIDER NOTE - CARE PROVIDER_API CALL
Dov Morales.  Urology  [address]  [place] [address]  Phone: [phone]  Fax: [phone]  Follow Up Time:

## 2024-09-24 NOTE — ED ADULT NURSE NOTE - OBJECTIVE STATEMENT
Pt reports he noted blood in his valdez catheter bag x few days. pt states he needs his valdez catheter changed, stating it has been 1.5 months since last change. Pt had valdez placed d/t retention and enlarged prostate. Pt denies any N/V, no fever/chills. Pt Aox4, ambulatory with steady gait.

## 2024-09-24 NOTE — ED PROVIDER NOTE - OBJECTIVE STATEMENT
57 y/o male w/ hx chronic indwelling valdez for BPH presents to ED requesting valdez catheter change.  States last changed 2 mo ago (seen in ED by me 7/30/24).  Has not f/u with urology 2/2 work schedule.  Feels well.  Denies f/c, abd pain, flank pain, blood in urine, foul smelling urine.

## 2024-09-24 NOTE — ED PROVIDER NOTE - NSFOLLOWUPINSTRUCTIONS_ED_ALL_ED_FT
Thank you for visiting [facility] Emergency Department.      We saw you today for a valdez catheter exchange.    Please follow up with urologist in 1 week for re-evaluation.   Please know that no emergency visit is complete without follow-up with your primary care provider in 1 week.  Please bring copies of all discharge papers and results and show to your doctor.      Please continue taking all previous medications as instructed unless we discussed otherwise.     I appreciated your patience and hope you feel better soon.     Return to ER immediately if you develop fevers, chills, chest pain, shortness of breath, blood in urine, foul smelling urine, worsening and/or any concerning symptoms.

## 2024-09-27 LAB
-  AMIKACIN: SIGNIFICANT CHANGE UP
-  AMOXICILLIN/CLAVULANIC ACID: SIGNIFICANT CHANGE UP
-  AMPICILLIN/SULBACTAM: SIGNIFICANT CHANGE UP
-  AMPICILLIN: SIGNIFICANT CHANGE UP
-  CEFAZOLIN: SIGNIFICANT CHANGE UP
-  CEFEPIME: SIGNIFICANT CHANGE UP
-  CEFEPIME: SIGNIFICANT CHANGE UP
-  CEFOXITIN: SIGNIFICANT CHANGE UP
-  CEFTAZIDIME: SIGNIFICANT CHANGE UP
-  CEFTRIAXONE: SIGNIFICANT CHANGE UP
-  CIPROFLOXACIN: SIGNIFICANT CHANGE UP
-  CIPROFLOXACIN: SIGNIFICANT CHANGE UP
-  GENTAMICIN: SIGNIFICANT CHANGE UP
-  IMIPENEM: SIGNIFICANT CHANGE UP
-  LEVOFLOXACIN: SIGNIFICANT CHANGE UP
-  LEVOFLOXACIN: SIGNIFICANT CHANGE UP
-  MEROPENEM: SIGNIFICANT CHANGE UP
-  NITROFURANTOIN: SIGNIFICANT CHANGE UP
-  PIPERACILLIN/TAZOBACTAM: SIGNIFICANT CHANGE UP
-  PIPERACILLIN/TAZOBACTAM: SIGNIFICANT CHANGE UP
-  TOBRAMYCIN: SIGNIFICANT CHANGE UP
-  TRIMETHOPRIM/SULFAMETHOXAZOLE: SIGNIFICANT CHANGE UP
CULTURE RESULTS: ABNORMAL
METHOD TYPE: SIGNIFICANT CHANGE UP
METHOD TYPE: SIGNIFICANT CHANGE UP
ORGANISM # SPEC MICROSCOPIC CNT: ABNORMAL
ORGANISM # SPEC MICROSCOPIC CNT: ABNORMAL
ORGANISM # SPEC MICROSCOPIC CNT: SIGNIFICANT CHANGE UP
SPECIMEN SOURCE: SIGNIFICANT CHANGE UP

## 2024-10-05 NOTE — ED PROVIDER NOTE - EYES, MLM
Physical Therapy      Patient Name:  Abbie Barragan   MRN:  [id number]    Patient not seen today secondary to Testing (XR Gastrograffin challenge). Will follow-up as able later hour/day .     Clear bilaterally, pupils equal

## 2024-10-16 NOTE — ED PROVIDER NOTE - NSICDXPASTMEDICALHX_GEN_ALL_CORE_FT
Your blood pressure is improving but remains above goal, increase your Losartan to 100 mg daily, follow up in 2 weeks   Monitor your blood pressure once a day.  Call for a sooner appointment if you have additional concerns.        Erin Stein MD  Internal Medicine    
PAST MEDICAL HISTORY:  Benign prostatic hyperplasia, presence of lower urinary tract symptoms unspecified, unspecified morphology

## 2024-10-20 ENCOUNTER — EMERGENCY (EMERGENCY)
Facility: HOSPITAL | Age: 59
LOS: 1 days | Discharge: HOME | End: 2024-10-20
Attending: EMERGENCY MEDICINE | Admitting: EMERGENCY MEDICINE
Payer: MEDICAID

## 2024-10-20 VITALS
HEART RATE: 116 BPM | HEIGHT: 72 IN | WEIGHT: 190.04 LBS | DIASTOLIC BLOOD PRESSURE: 95 MMHG | SYSTOLIC BLOOD PRESSURE: 143 MMHG | OXYGEN SATURATION: 98 % | TEMPERATURE: 98 F | RESPIRATION RATE: 17 BRPM

## 2024-10-20 VITALS — RESPIRATION RATE: 18 BRPM

## 2024-10-20 LAB
APPEARANCE UR: ABNORMAL
BACTERIA # UR AUTO: ABNORMAL /HPF
BILIRUB UR-MCNC: NEGATIVE — SIGNIFICANT CHANGE UP
CAST: 0 /LPF — SIGNIFICANT CHANGE UP (ref 0–4)
COLOR SPEC: YELLOW — SIGNIFICANT CHANGE UP
DIFF PNL FLD: NEGATIVE — SIGNIFICANT CHANGE UP
GLUCOSE UR QL: NEGATIVE MG/DL — SIGNIFICANT CHANGE UP
KETONES UR-MCNC: NEGATIVE MG/DL — SIGNIFICANT CHANGE UP
LEUKOCYTE ESTERASE UR-ACNC: ABNORMAL
NITRITE UR-MCNC: POSITIVE
PH UR: 7 — SIGNIFICANT CHANGE UP (ref 5–8)
PROT UR-MCNC: 30 MG/DL
RBC CASTS # UR COMP ASSIST: 0 /HPF — SIGNIFICANT CHANGE UP (ref 0–4)
SP GR SPEC: 1.01 — SIGNIFICANT CHANGE UP (ref 1–1.03)
SQUAMOUS # UR AUTO: 6 /HPF — HIGH (ref 0–5)
UROBILINOGEN FLD QL: 0.2 MG/DL — SIGNIFICANT CHANGE UP (ref 0.2–1)
WBC UR QL: 72 /HPF — HIGH (ref 0–5)

## 2024-10-20 PROCEDURE — 87491 CHLMYD TRACH DNA AMP PROBE: CPT

## 2024-10-20 PROCEDURE — 99284 EMERGENCY DEPT VISIT MOD MDM: CPT

## 2024-10-20 PROCEDURE — 99283 EMERGENCY DEPT VISIT LOW MDM: CPT | Mod: 25

## 2024-10-20 PROCEDURE — 87086 URINE CULTURE/COLONY COUNT: CPT

## 2024-10-20 PROCEDURE — 81001 URINALYSIS AUTO W/SCOPE: CPT

## 2024-10-20 PROCEDURE — 87591 N.GONORRHOEAE DNA AMP PROB: CPT

## 2024-10-20 PROCEDURE — 51702 INSERT TEMP BLADDER CATH: CPT

## 2024-10-20 RX ADMIN — Medication 1 TABLET(S): at 08:47

## 2024-10-20 NOTE — ED PROVIDER NOTE - CLINICAL SUMMARY MEDICAL DECISION MAKING FREE TEXT BOX
58 M pmh bph w/ chronic indwelling valdez p/w malodorous urine/dysuria and requesting valdez change.  on exam pt initially tachy (rpt wnl), afebrile, well appearing, Abd: nondistended, soft, nontender, no r/g, no cvat; leg bag w/ clear yellow urine.  r/o uti, will obtain ua/culture and exchange valdez.    UA + LE/nitrite.  ?chronic colonization but in setting sxs will tx.  will have f/u with urologist outpt.  discussed strict return parameters

## 2024-10-20 NOTE — ED PROVIDER NOTE - ATTENDING APP SHARED VISIT CONTRIBUTION OF CARE
58 M pmh bph w/ chronic indwelling valdez p/w malodorous urine/dysuria and requesting valdez change.  pt reports one week of strong odor to urine and burning around penile meatus.  states feels similar to infection in past.  also requesting valdez changed, typically gets it changed once a month; last on 9/24.  denies f/c, HA, dizziness, fainting, chest pain, sob, abd pain, back pain, testicular pain, trauma     on exam pt initially tachy (rpt wnl), afebrile, well appearing, Abd: nondistended, soft, nontender, no r/g, no cvat; leg bag w/ clear yellow urine.  r/o uti, will obtain ua/culture and exchange valdez.    UA + LE/nitrite.  ?chronic colonization but in setting sxs will tx.  will have f/u with urologist outpt.  discussed strict return parameters    Addendum to attending statement: I have reviewed the ACP note and agree with the history, exam, and plan of care. I  was available to PA   as a supervising provider if needed. PA given opportunity to ask questions and request further evaluation / care.

## 2024-10-20 NOTE — ED ADULT NURSE REASSESSMENT NOTE - NS ED NURSE REASSESS COMMENT FT1
Valdez changed using sterile technique. 14 G valdez placed with leg bag. Patient verbalized understanding of valdez care.

## 2024-10-20 NOTE — ED ADULT TRIAGE NOTE - CHIEF COMPLAINT QUOTE
Patient presents to ED with urinary sx. Pt with hx of bph, has chronic indwelling catheter. Pt reports that there is decreased output, reports some blood as well. Denies abd pain, no fevers/chills.

## 2024-10-20 NOTE — ED PROVIDER NOTE - PHYSICAL EXAMINATION
Vitals reviewed  Gen: well appearing, nad, speaking in full sentences  Skin: wwp, no rash/lesions  HEENT: ncat, eomi, mmm, airway patent   CV: rrr, no audible m/r/g  Resp: symmetrical expansion, ctab, no w/r/r  Abd: nondistended, soft, nontender, no r/g, no cvat; leg bag w/ clear yellow urine  Ext: FROM throughout, no peripheral edema, no muscle or joint ttp, distal pulses 2+  Neuro: alert/oriented, no focal deficits, steady gait

## 2024-10-20 NOTE — ED PROVIDER NOTE - NSFOLLOWUPINSTRUCTIONS_ED_ALL_ED_FT
Please take full course of antibiotics as prescribed    Please call to arrange follow up with your urologist within one week     Urinary Tract Infection    A urinary tract infection (UTI) is an infection of any part of the urinary tract, which includes the kidneys, ureters, bladder, and urethra. Risk factors include ignoring your need to urinate, wiping back to front if female, being an uncircumcised male, and having diabetes or a weak immune system. Symptoms include frequent urination, pain or burning with urination, foul smelling urine, cloudy urine, pain in the lower abdomen, blood in the urine, and fever. If you were prescribed an antibiotic medicine, take it as told by your health care provider. Do not stop taking the antibiotic even if you start to feel better.    SEEK IMMEDIATE MEDICAL CARE IF YOU HAVE ANY OF THE FOLLOWING SYMPTOMS: severe back or abdominal pain, fever, inability to keep fluids or medicine down, dizziness/lightheadedness, or a change in mental status. No

## 2024-10-20 NOTE — ED PROVIDER NOTE - PATIENT PORTAL LINK FT
You can access the FollowMyHealth Patient Portal offered by [facility] by registering at the following website: [email or web address]. By joining [employer]’s FollowMyHealth portal, you will also be able to view your health information using other applications (apps) compatible with our system.

## 2024-10-20 NOTE — ED PROVIDER NOTE - OBJECTIVE STATEMENT
58 M pmh bph w/ chronic indwelling valdez p/w malodorous urine/dysuria and requesting valdez change.  pt reports one week of strong odor to urine and burning around penile meatus.  states feels similar to infection in past.  also requesting valdez changed, typically gets it changed once a month; last on 9/24.  denies f/c, HA, dizziness, fainting, chest pain, sob, abd pain, back pain, testicular pain, trauma

## 2024-10-21 LAB
C TRACH RRNA SPEC QL NAA+PROBE: SIGNIFICANT CHANGE UP
CULTURE RESULTS: SIGNIFICANT CHANGE UP
N GONORRHOEA RRNA SPEC QL NAA+PROBE: SIGNIFICANT CHANGE UP
SPECIMEN SOURCE: SIGNIFICANT CHANGE UP
SPECIMEN SOURCE: SIGNIFICANT CHANGE UP

## 2024-10-22 DIAGNOSIS — N39.0 URINARY TRACT INFECTION, SITE NOT SPECIFIED: ICD-10-CM

## 2024-10-22 DIAGNOSIS — N40.0 BENIGN PROSTATIC HYPERPLASIA WITHOUT LOWER URINARY TRACT SYMPTOMS: ICD-10-CM

## 2024-10-22 DIAGNOSIS — R00.0 TACHYCARDIA, UNSPECIFIED: ICD-10-CM

## 2024-10-22 DIAGNOSIS — R30.0 DYSURIA: ICD-10-CM

## 2024-11-23 NOTE — ED PROVIDER NOTE - NS ED ATTENDING STATEMENT MOD
TARUN CRESPO is a 70 y/o M w/ PMHx HLD, depression, GERD, gout, colonic polyps,  congenital absence of one kidney, recent cholecystitis s/p cholecystectomy (6/24), RHETT on CPAP orginially presented to [facility] on 11/3 with aphasia, R sided weakness and R facial droop and vigorous cough x 2 weeks s/p COVID vaccine. Found to have L MCA syndrome 2/2 L ICA occulusion and L petrous ICA dissection.  S/p L ICA terminus occulusion a/p L ICA thrombectomy, no stent, 5 passes TICI 2C  w/ underling L petrous ICA dissection on 11/3 w/ Dr. March. Now admitted to [facility] for functional deficits for initiation of a multidisciplinary rehab program     #L MCA stroke w/ hemorrhagic conversion  #L petrous ICA dissection w/ pesudoaneurysm  -CTH 11/3: hyperdense appearance of L basal ganglia and L frontal lobe gyri, mild LCA edema 2/2 recent ischemia  -CTA H/N 11/3: no evidence of significant stenosis affecting the extra cranial carotid or vertebral arteries. No evidence of aneurysm or significant vascular stenosis at [place] of [place]  -Repeat CTH 11/4: post recent endovascular revascularization of the left internal carotid artery and left middle cerebral artery. Minimally less conspicuous hyperdense appearance of left basal ganglia and left frontal lobe gyri which may represent expected evolution of contrast staining  -MRI brain 11/5: evolving recent infarct in L ICA associated with hemorrhagic transformation of the L basal ganglia. Effacement of cerebral sulci with mass effect causing 0.6 mm rightward shift.   -CTH 11/9: evolving acute to subacute left MCA infarct with evolving left basal ganglia hemorrhagic transformation, new punctuate foci of increased hyperdensity. Stable mass effect and 5mm rightward midline shift  -CTA 11/9: stable left internal carotid artery pseudoaneurysm immediately inferior to the petrous portion of the L ICA at the area of focal dissection.  -S/p L ICA terminus occulusion a/p L ICA thrombectomy, no stent, 5 passes TICI 2C  w/ underling L petrous ICA dissection on 11/3 w/ Dr. March.   -A1c 5.0; LDL 60  -Aspirin 325mg daily  -Plavix 75mg daily   rosuvastatin 5mg QD  -Will be on DAPT for 3 months and follow up with outpt neurology  -F/u outpatient caridology for Zio-patch to r/o cardioembolic source --ILR to be considered as outpatient    #Urinary Retention, repeated > 500cc, new order for jerome, initially deferred  - flomax  - treat constipation aggressively   - Already on Macrobid since 11/20 for UTI, afebrile, no leukocytosis on labs, will monitor         # Normocytic anemia (stable)   -Monitor H/H (12.2/37.2- on 11/14)  -F/u outpatient colonoscopy for hx of colon polps  -Transfuse <7 PRN    -Iron studies: Transferrin: 11, TIBC: 170, Iron: 18, Ferritin: 414    # Primary HTN   c/w Losartan      #RHETT  #URI   #Cough   -CXR- negative; repeat ordered per pulm this AM, will f/u result  -CPAP HS --unknown settings will need to confirm with wife  -Albuterol PRN  -On Flovent at home (not available here)  - supplemental O2 PRN, taper to 1 L and maintain sat > 90%    #Gout  -Colchicine 0.6mg daily  -Uric acid 11/12- 6.8     # R Soleal Vein DVT  - Duplex U/S showed DVT below the knee, R soleal vein thrombosis 11/14  - Aspirin 325 mg daily  - Plavix 75 mg daily  - Lovenox 40 mg SubQ daily 11/15  --f/u weekly dopplers    #Depression  -Paxil 30mg daily     # Hypernatremia  Na 139 > 146, will repeat labs and monitor       Attending Only

## 2024-12-17 NOTE — ED ADULT NURSE NOTE - NS PRO PASSIVE SMOKE EXP
As Follows:  CONST: Well appearing in NAD  EYES: PERRL, EOMI, Sclera and conjunctiva clear.   ENT: Oropharynx normal appearing, no erythema / exudates. Uvula midline. Airway intact.   CARD: No murmurs, rubs, or gallops; Normal rate and rhythm  RESP: BS Equal B/L, No wheezes, rhonchi or rales. No distress or accessory breathing  GI: Soft, non-tender, non-distended.  MS: Nontender pelvis/chest wall/ upper/lower extremities. Normal ROM in all extremities. No midline Cervical/Thoracic/Lumbar spinal tenderness.  SKIN: Warm, dry, no acute rashes. MMM  NEURO: Alert and Oriented to person and place. Generalized weakness.
No

## 2025-01-17 ENCOUNTER — EMERGENCY (EMERGENCY)
Facility: HOSPITAL | Age: 60
LOS: 1 days | Discharge: HOME | End: 2025-01-17
Attending: STUDENT IN AN ORGANIZED HEALTH CARE EDUCATION/TRAINING PROGRAM | Admitting: STUDENT IN AN ORGANIZED HEALTH CARE EDUCATION/TRAINING PROGRAM
Payer: MEDICAID

## 2025-01-17 VITALS
RESPIRATION RATE: 16 BRPM | OXYGEN SATURATION: 98 % | DIASTOLIC BLOOD PRESSURE: 86 MMHG | SYSTOLIC BLOOD PRESSURE: 153 MMHG | HEART RATE: 95 BPM

## 2025-01-17 VITALS
DIASTOLIC BLOOD PRESSURE: 78 MMHG | TEMPERATURE: 97 F | SYSTOLIC BLOOD PRESSURE: 137 MMHG | RESPIRATION RATE: 18 BRPM | WEIGHT: 190.04 LBS | OXYGEN SATURATION: 97 % | HEIGHT: 72 IN | HEART RATE: 121 BPM

## 2025-01-17 LAB
ANION GAP SERPL CALC-SCNC: 11 MMOL/L — SIGNIFICANT CHANGE UP (ref 5–17)
APPEARANCE UR: CLEAR — SIGNIFICANT CHANGE UP
APTT BLD: 30.8 SEC — SIGNIFICANT CHANGE UP (ref 24.5–35.6)
BILIRUB UR-MCNC: NEGATIVE — SIGNIFICANT CHANGE UP
BUN SERPL-MCNC: 13 MG/DL — SIGNIFICANT CHANGE UP (ref 7–23)
CALCIUM SERPL-MCNC: 9.4 MG/DL — SIGNIFICANT CHANGE UP (ref 8.4–10.5)
CHLORIDE SERPL-SCNC: 98 MMOL/L — SIGNIFICANT CHANGE UP (ref 96–108)
CO2 SERPL-SCNC: 27 MMOL/L — SIGNIFICANT CHANGE UP (ref 22–31)
COLOR SPEC: YELLOW — SIGNIFICANT CHANGE UP
CREAT SERPL-MCNC: 0.92 MG/DL — SIGNIFICANT CHANGE UP (ref 0.5–1.3)
DIFF PNL FLD: ABNORMAL
EGFR: 96 ML/MIN/1.73M2 — SIGNIFICANT CHANGE UP
GLUCOSE SERPL-MCNC: 106 MG/DL — HIGH (ref 70–99)
GLUCOSE UR QL: NEGATIVE MG/DL — SIGNIFICANT CHANGE UP
HCT VFR BLD CALC: 41.4 % — SIGNIFICANT CHANGE UP (ref 39–50)
HGB BLD-MCNC: 13.2 G/DL — SIGNIFICANT CHANGE UP (ref 13–17)
INR BLD: 1.11 — SIGNIFICANT CHANGE UP (ref 0.85–1.16)
KETONES UR-MCNC: NEGATIVE MG/DL — SIGNIFICANT CHANGE UP
LEUKOCYTE ESTERASE UR-ACNC: ABNORMAL
MCHC RBC-ENTMCNC: 29.1 PG — SIGNIFICANT CHANGE UP (ref 27–34)
MCHC RBC-ENTMCNC: 31.9 G/DL — LOW (ref 32–36)
MCV RBC AUTO: 91.2 FL — SIGNIFICANT CHANGE UP (ref 80–100)
NITRITE UR-MCNC: POSITIVE
NRBC # BLD: 0 /100 WBCS — SIGNIFICANT CHANGE UP (ref 0–0)
PH UR: 7.5 — SIGNIFICANT CHANGE UP (ref 5–8)
PLATELET # BLD AUTO: 181 K/UL — SIGNIFICANT CHANGE UP (ref 150–400)
POTASSIUM SERPL-MCNC: 4 MMOL/L — SIGNIFICANT CHANGE UP (ref 3.5–5.3)
POTASSIUM SERPL-SCNC: 4 MMOL/L — SIGNIFICANT CHANGE UP (ref 3.5–5.3)
PROT UR-MCNC: SIGNIFICANT CHANGE UP MG/DL
PROTHROM AB SERPL-ACNC: 13 SEC — SIGNIFICANT CHANGE UP (ref 9.9–13.4)
RBC # BLD: 4.54 M/UL — SIGNIFICANT CHANGE UP (ref 4.2–5.8)
RBC # FLD: 12.3 % — SIGNIFICANT CHANGE UP (ref 10.3–14.5)
SODIUM SERPL-SCNC: 136 MMOL/L — SIGNIFICANT CHANGE UP (ref 135–145)
SP GR SPEC: 1.01 — SIGNIFICANT CHANGE UP (ref 1–1.03)
UROBILINOGEN FLD QL: 1 MG/DL — SIGNIFICANT CHANGE UP (ref 0.2–1)
WBC # BLD: 7.91 K/UL — SIGNIFICANT CHANGE UP (ref 3.8–10.5)
WBC # FLD AUTO: 7.91 K/UL — SIGNIFICANT CHANGE UP (ref 3.8–10.5)

## 2025-01-17 PROCEDURE — 87186 SC STD MICRODIL/AGAR DIL: CPT

## 2025-01-17 PROCEDURE — 87086 URINE CULTURE/COLONY COUNT: CPT

## 2025-01-17 PROCEDURE — 87077 CULTURE AEROBIC IDENTIFY: CPT

## 2025-01-17 PROCEDURE — 51702 INSERT TEMP BLADDER CATH: CPT

## 2025-01-17 PROCEDURE — 99284 EMERGENCY DEPT VISIT MOD MDM: CPT

## 2025-01-17 PROCEDURE — 85027 COMPLETE CBC AUTOMATED: CPT

## 2025-01-17 PROCEDURE — 81001 URINALYSIS AUTO W/SCOPE: CPT

## 2025-01-17 PROCEDURE — 99284 EMERGENCY DEPT VISIT MOD MDM: CPT | Mod: 25

## 2025-01-17 PROCEDURE — 80048 BASIC METABOLIC PNL TOTAL CA: CPT

## 2025-01-17 PROCEDURE — 96374 THER/PROPH/DIAG INJ IV PUSH: CPT | Mod: XU

## 2025-01-17 PROCEDURE — 85610 PROTHROMBIN TIME: CPT

## 2025-01-17 PROCEDURE — 36415 COLL VENOUS BLD VENIPUNCTURE: CPT

## 2025-01-17 PROCEDURE — 85730 THROMBOPLASTIN TIME PARTIAL: CPT

## 2025-01-17 RX ORDER — CEFTRIAXONE SODIUM 1 G/1
1000 INJECTION, POWDER, FOR SOLUTION INTRAMUSCULAR; INTRAVENOUS ONCE
Refills: 0 | Status: COMPLETED | OUTPATIENT
Start: 2025-01-17 | End: 2025-01-17

## 2025-01-17 RX ORDER — SODIUM CHLORIDE 9 MG/ML
1000 INJECTION, SOLUTION INTRAMUSCULAR; INTRAVENOUS; SUBCUTANEOUS ONCE
Refills: 0 | Status: COMPLETED | OUTPATIENT
Start: 2025-01-17 | End: 2025-01-17

## 2025-01-17 RX ORDER — CEFPODOXIME PROXETIL 100 MG/5ML
1 GRANULE, FOR SUSPENSION ORAL
Qty: 14 | Refills: 0
Start: 2025-01-17 | End: 2025-01-23

## 2025-01-17 RX ADMIN — CEFTRIAXONE SODIUM 100 MILLIGRAM(S): 1 INJECTION, POWDER, FOR SOLUTION INTRAMUSCULAR; INTRAVENOUS at 21:02

## 2025-01-17 RX ADMIN — SODIUM CHLORIDE 1000 MILLILITER(S): 9 INJECTION, SOLUTION INTRAMUSCULAR; INTRAVENOUS; SUBCUTANEOUS at 21:02

## 2025-01-17 RX ADMIN — SODIUM CHLORIDE 1000 MILLILITER(S): 9 INJECTION, SOLUTION INTRAMUSCULAR; INTRAVENOUS; SUBCUTANEOUS at 21:01

## 2025-01-17 NOTE — ED PROVIDER NOTE - ATTENDING APP SHARED VISIT CONTRIBUTION OF CARE
Pt fell twice today while walking his dog, denies LOC, not on blood thinners. Reports he felt dizzy prior to falling. Still c/o dizziness/lightheadedness in triage. Complains of mild right arm pain, states \"I think that's the side I fell on\", denies pain elsewhere.   hematuria surrounding Valdez insertion site. No active bleeding noted in ED. small blood around the valdez and small amount of urine in the leg bag. plan for labs, change valdez, re-evalute. Importance to f/u with urologist stressed and explained to pt.

## 2025-01-17 NOTE — ED PROVIDER NOTE - PROGRESS NOTE DETAILS
Jacques catheter replaced, no bleeding, draining well, clear urine, UO-600ml. no leukocytosis, Hgb-13,2, BUN-13, Cr-0.92, pt ready for discharge.

## 2025-01-17 NOTE — ED ADULT NURSE REASSESSMENT NOTE - NS ED NURSE REASSESS COMMENT FT1
Per order, new 14F valdez catheter inserted while maintaining sterile technique. Pt tolerated appropriately. Valdez catheter draining.

## 2025-01-17 NOTE — ED PROVIDER NOTE - CLINICAL SUMMARY MEDICAL DECISION MAKING FREE TEXT BOX
58 yo male with h/o BPH, chronic indwelling valdez catheter, present to Er c/o bleeding from his penis  around the catheter. Pt states he didn't changed his catheter and leg bag for more than 2 month. Last 2 weeks noted bleeding around the catheter. Pt states today was bleeding with clots, which made him very concerned. Pt denies fever, chills abdominal pain, diarrhea constipations.  Pt tachycardiac in the triage, no suprapubic tenderness, no CVAT, no active bleeding noted. small blood around the valdez and small amount of urine in the leg bag. plan for labs, change valdez, re-evalute. Importance to f/u with urologist stressed and explained to pt.

## 2025-01-17 NOTE — ED ADULT NURSE NOTE - SUICIDE SCREENING QUESTION 2
This is a historical note converted from Suraj. Formatting and pictures may have been removed.  Please reference Suraj for original formatting and attached multimedia. Chief Complaint  f/u viist , c/o burning on urination with odor , , back pain  History of Present Illness  70-year-old white male?here for scheduled follow-up.  ?   Dysuria - Treated for UTI by Dr. Gates ([facility]) with Bactrim 7 days following dysuria after hospital stay in 11/2018. Symptoms resolved but resumed yesterday. Has dysuria, and metallic smell of urine.  ?   back pain - sees Dr. Gonzalez neurosurgeon for pinched nerve. Cardiologist will not clear him for back surgery so he receives steroid injections for pain relief.  ?   CAD -? 11/2018 sudden onset substernal chest pain and left arm. Temporary relief by NG but persisted after 2 NG and went to [facility]. Received 3 balloons and a laser ablation.  ?  Testosterone- Has been out of prescription for 2 months and provider was not notified. Did comply with bimonthly phlebotomy and last Hgb was 13.7 at [facility] inpatient. Will refill Testosterone and then resume Phlebotomy for Hgb >15 after one month of being on Testosterone.  ?   Anxiety/depression- Is only taking Duloxetine 60 mg every 3 days. Does not like the way it makes him feel. Desires to discontinue Duloxetine and only take Klonopin PRN.  ?   Actinic Keratosis - Saw Dr. Li on 12/18/18 and received Cryotherapy and prescription for Effudex.  ?   Health care maintenance - Did not get Colonoscopy in October 2018 as planned. Declines FIT test today.  Review of Systems  Constitutional: no fever  Eye: no scleral icterus  ENMT: no sore throat  Respiratory: no cough, no shortness of breath  Cardiovascular: no chest pain, no palpitations, no edema  Gastrointestinal: no nausea, vomiting, or diarrhea. No abdominal pain  Integumentary: no skin rash  Physical Exam  Vitals & Measurements  T:?36.4? ?C (Oral)? HR:?77(Peripheral)? RR:?20? BP:?111/67? SpO2:?95%?  WT:?89.1?kg? WT:?89.1?kg?  General: appears well, in no acute distress  Eye: PERRLA, EOMI, no scleral icterus  HENT: oropharynx without erythema/exudate, oropharynx and nasal mucosal surfaces moist  Neck: full range of motion, no thyromegaly or lymphadenopathy  Respiratory: clear to auscultation bilaterally, nonlabored respirations  Cardiovascular: regular rate and rhythm without murmurs, gallops or rubs  Gastrointestinal:?soft, non-tender, non-distended with normal bowel sounds, without masses to palpation  Genitourinary: no CVA tenderness to palpation  Integumentary: AKs over scalp, healing blisters from cryosurgery  Assessment/Plan  1.?Acute cystitis?N30.00  -UA consistent with UTI  -Macrobid 100 mg BID x5days  ?  2.?Anxiety?F41.9  -Will discontinue Fluoxetine 60 mg. Only taking every 3 days, will not wean and can discontinue today.  -Advised to begin Buspar but patient wants to research drug for side effects before taking it. He does not desire maintenance anxiety medicine and only wants a rescue medicine such as Klonopin. Extensive counseling this visit on standard of care being to use maintenance and the dangers of Benzodiazepines. Patient adamant that he does not want maintenance and will only use Klonopin at this time.  -Continue Klonopin 1 mg TID PRN  Ordered:  clonazePAM, 1 mg = 1 tab(s), Oral, TID, PRN PRN anxiety, Anxiety F41.9, # 90 tab(s), 3 Refill(s), Pharmacy: [facility]/pharmacy #[id number]  Clinic Follow up, *Est. 02/26/19 3:00:00 CST, Order for future visit, Dysuria  AK (actinic keratosis)  Anxiety  Low testosterone, [facility]  ?  3.?Low testosterone?E29.1  -Testosterone refilled  -Last Hgb 13.7, after being back on Testosterone for 1 month will need to resume going to [facility] for Hgb checks and get phlebotomized for Hgb>15  Ordered:  Clinic Follow up, *Est. 02/26/19 3:00:00 CST, Order for future visit, Dysuria  AK (actinic keratosis)  Anxiety  Low testosterone, [facility]  ?  4.?CAD (coronary artery disease)?I25.10  -Continue Followup with [facility]  ?  5.?Health care maintenance?Z00.00  -Did not get Colonoscopy in October 2018 as planned. Declines FIT test today.  -I extensively counseled him that he is already 20 years past recommended screening age. He understands and will?reestablish contact with outside GI for colonoscopy.  ?  6.?AK (actinic keratosis)?L57.0  -Continue Effudex as prescribed by Dr. Li.  Ordered:  Clinic Follow up, *Est. 02/26/19 3:00:00 CST, Order for future visit, Dysuria  AK (actinic keratosis)  Anxiety  Low testosterone, [facility]  ?  Orders:  nitrofurantoin, 100 mg = 1 cap(s), Oral, BID, X 7 day(s), # 14 cap(s), 0 Refill(s), Pharmacy: [facility]/pharmacy #[id number]  sildenafil, 100 mg = 1 tab(s), Oral, Daily, PRN PRN erectile dysfunction, # 30 tab(s), 2 Refill(s), Pharmacy: [facility]/pharmacy #[id number], Patient Education Provided, Patient Verbalizes Understanding  testosterone, See Instructions, 200 mg IM inject 0.5 ml inject into muscle q week., # 10 mL, 5 Refill(s), Pharmacy: [facility]/pharmacy #[id number]  RTC 2 months   Problem List/Past Medical History  Ongoing  ACS (acute coronary syndrome)  AK (actinic keratosis)  Anxiety(  Confirmed  )  BCC (basal cell carcinoma), leg  CAD (coronary artery disease)  Erectile dysfunction  Herniation of nucleus pulposus of cervical intervertebral disc  History of basal cell carcinoma of skin  History of squamous cell carcinoma of skin  HTN (hypertension)(  Confirmed  )  Joint pain(  Confirmed  )  Kidney stones  Nabothian cyst  Osteoarthritis  Seborrheic keratoses  Sleep apnea  Testicular hypofunction(  Confirmed  )  Historical  Basal cell carcinoma  Diverticulitis  HTN - Hypertension  Hyperlipidemia  Intervertebral disc degeneration  NSTEMI - Non-ST segment elevation MI  SOB - Shortness of breath  Procedure/Surgical History  Total Hip Arthroplasty (Right) (05/13/2014)  CABG x 2 - Coronary artery bypass grafts x  2 (01/01/2010)  CABG x 4 - Coronary artery bypass grafts x 4 (06/01/1999)  Insertion of coronary artery stent  Lithotripsy  Tonsillectomy and adenoidectomy  Uvulectomy   Medications  Inpatient  No active inpatient medications  Home  aspirin 81 mg oral tablet, CHEWABLE, 81 mg= 1 tab(s), Oral, Daily  ATORVASTATIN 10 MG TABLET, 10 mg= 1 tab(s), Oral, Daily  Brilinta (ticagrelor) 90 mg oral tablet, 90 mg= 1 tab(s), Oral, BID  Efudex 5% topical cream, 1 tri, TOP, BID, 3 refills  fluticasone 50 mcg/inh nasal spray, 50 mcg, Nasal, Daily  ISOSORBIDE MONONIT ER 30 MG TB, 30 mg= 1 tab(s), Oral, Daily  KlonoPIN 1 mg oral tablet, 1 mg= 1 tab(s), Oral, TID, PRN, 3 refills  lisinopril 5 mg oral tablet, 5 mg= 1 tab(s), Oral, Daily, 3 refills  loratadine 10 mg oral tablet, 10 mg= 1 tab(s), Oral, Daily, 3 refills  Metoprolol Tartrate 25 mg oral tablet, 25 mg= 1 tab(s), Oral, BID, 3 refills  nitrofurantoin macrocrystals-monohydrate 100 mg oral capsule, 100 mg= 1 cap(s), Oral, BID  nitroglycerin 0.4 mg sublingual TAB, 0.4 mg= 1 tab(s), SL, q5min, PRN, 5 refills  NITROGLYCERIN 0.4 MG TABLET SL  Protonix 40 mg ORAL enteric coated tablet, 40 mg= 1 tab(s), Oral, Daily, 5 refills  RANEXA  MG TABLET, 500 mg= 1 tab(s), Oral, BID  sildenafil 100 mg oral tablet, 100 mg= 1 tab(s), Oral, Daily, PRN, 2 refills  testosterone cypionate 200 mg/mL intramuscular solution, See Instructions, 5 refills  Allergies  No Known Medication Allergies  Tomatoes?(Blisters)  Social History  Alcohol  Current, Wine, Liquor, 3-5 times per week, 11/09/2017  Employment/School  Employed, Operates hazardous equipment: No., 12/26/2018  Exercise  Exercise frequency: Daily., 12/26/2018  Home/Environment  Lives with Children, Spouse. Alcohol abuse in household: No. Substance abuse in household: Yes. Smoker in household: No. Feels unsafe at home: No. Family/Friends available for support: Yes. Concern for family members at home: Yes. Major illness in household: Yes.  Financial concerns: No., 12/26/2018  Nutrition/Health  Regular, Wants to lose weight: No. Sleeping concerns: Yes. Feels highly stressed: Yes., 12/26/2018  Sexual  Sexually active: Yes., 12/26/2018  Substance Abuse - Denies Substance Abuse, 05/06/2014  Never, 09/09/2016  Tobacco - Denies Tobacco Use, 05/06/2014  Former smoker, N/A, 12/26/2018  Former smoker, N/A, 12/18/2018  Smoker, current status unknown, No, 11/13/2018  Former smoker, N/A, 11/12/2018  Former smoker, No, 11/06/2018  Former smoker, Cigarettes, 20 per day. 20 year(s). Started age 18.0 Years. Stopped age 38 Years., 11/09/2017  Family History  Asthma.: Negative: Father.  CAD (coronary artery disease): Negative: Father.  COPD: Negative: Father.  Cancer: Negative: Father.  Cataract.: Negative: Father.  DM (diabetes mellitus): Negative: Father.  Depression.: Negative: Father.  Hypertension.: Mother.  Sickle cell disease.: Negative: Father.  Stroke: Father.  Tobacco use.: Negative: Father.  Immunizations  Vaccine Date Status   influenza virus vaccine, inactivated 08/14/2017 Recorded   pneumococcal 13-valent conjugate vaccine 01/09/2017 Given   tetanus/diphtheria/pertussis, acel(Tdap) 08/22/2014 Recorded   pneumococcal vacc 08/22/2014 Recorded   Lab Results  Test Name Test Result Date/Time Comments   Hgb 13.7 gm/dL (Low) 11/15/2018 04:39 CST    UA Nitrite 2+ (Abnormal) 12/26/2018 16:30 CST Result created by Discern Rule.   UA Leuk Est 500 (Abnormal) 12/26/2018 16:30 CST    UA WBC Interp 51-99 (Abnormal) 12/26/2018 16:30 CST Result created by Discern Rule.   UA Bact Interp Many (Abnormal) 12/26/2018 16:30 CST    UA Squam Epi Interp 1-2 12/26/2018 16:30 CST Result created by Discern Rule.       I was present with the resident during the history and exam.  ?  [ x ] I discussed the case with the resident and agree with the findings and plan as documented in the residents note.  ?   No

## 2025-01-17 NOTE — ED ADULT NURSE NOTE - OBJECTIVE STATEMENT
Pt A&Ox4 presents to ED with complaints of hematuria. Pt reports 14F valdez catheter placed 2 months ago. Reports a few weeks of "blood clots coming out around the catheter at the tip of the penis." Reports foul smelling urine. Valdez catheter bag draining urine. Denies chest pain, shortness of breath, fevers/chills, n/v, abdominal pain.

## 2025-01-17 NOTE — ED PROVIDER NOTE - PATIENT PORTAL LINK FT
You can access the FollowMyHealth Patient Portal offered by [facility] by registering at the following website: [email or web address]. By joining [facility]’s FollowMyHealth portal, you will also be able to view your health information using other applications (apps) compatible with our system.

## 2025-01-17 NOTE — ED PROVIDER NOTE - CARE PROVIDER_API CALL
Prashant Beauchamp.  Urology  [address]  [place] [address]  Phone: [phone]  Fax: [phone]  Follow Up Time:

## 2025-01-17 NOTE — ED PROVIDER NOTE - PHYSICAL EXAMINATION
Constitutional: awake and alert, in no acute distress  HEENT: head normocephalic and atraumatic. moist mucous membranes  Eyes: extraocular movements intact, normal conjunctiva  Neck: supple, normal ROM  Cardiovascular: regular rate , no r/g/m  Pulmonary: no respiratory distress  Gastrointestinal: abdomen flat and nondistended, NT, no CVAT, no suprapubic tenderness   : valdez in place, small bleeding at the meatus around the catheter, no blood in the urine bag  Skin: warm, dry, normal for ethnicity  Musculoskeletal: no edema, no deformity, NROM  Neurological: oriented x4, no focal neurologic deficit.   Psychiatric: calm and cooperative, no SI/HI

## 2025-01-17 NOTE — ED PROVIDER NOTE - NS ED ATTENDING STATEMENT MOD
This was a shared visit with the [facility]. I reviewed and verified the documentation. Gerry Yoder (DO), Cardiology; Internal Medicine  [address]  [address]  [place] [address]  Phone: [phone]  Fax: [phone]    Kevin Bennett (DO), Nephrology  [address]  [place] [address]  Phone: [phone]  Fax: [phone] Gerry Yoder (DO), Cardiology; Internal Medicine  [address]  [address]  [place] [address]  Phone: [phone]  Fax: [phone]    Kevin Bennett (DO), Nephrology  [address]  [place] [address]  Phone: [phone]  Fax: [phone]    [facility] [place],   [address]  [place], [place]  Phone: [phone]  Fax: (   )    -

## 2025-01-17 NOTE — ED ADULT TRIAGE NOTE - NSTRIAGECARE_GEN_A_ER
No new complaints.  Good FM.  Denies VB, LOF, Ctxs.    Tdap today  Discussed contraception:  She's not interested in anything at this time.    Discussed FKCs    TSH done today per [facility] recs.    Follow up 2 weeks    EKG

## 2025-01-17 NOTE — ED ADULT TRIAGE NOTE - CHIEF COMPLAINT QUOTE
"I have a valdez catheter for 2months I was supposed to change it but I haven't yet and I've had hematuria with clots for 1 week." AAOx3. Ambulatory. Denies fevers, chills, cp, sob, abd pain, back pain, nvd. -AC use. Tachycardic on arrival EKG in prog. Hx of BPH

## 2025-01-17 NOTE — ED PROVIDER NOTE - NSFOLLOWUPINSTRUCTIONS_ED_ALL_ED_FT
Please take medications as prescribed and follow up with your urologist for further management next week.    Jacques Catheter Placement and Care    WHAT YOU NEED TO KNOW:    A Jacques catheter is a sterile tube that is inserted into your bladder to drain urine. It is also called an indwelling urinary catheter.        DISCHARGE INSTRUCTIONS:    Return to the emergency department if:    Your catheter comes out.    You suddenly have material that looks like sand in the tubing or drainage bag.    You see blood in the tubing or drainage bag.    Little or no urine is draining into the bag, and you have checked the system.    You have pain in your hip, back, pelvis, or lower abdomen.    You are confused or cannot think clearly.    You see swelling, redness, pus, or burning where the catheter goes into your body.  Call your doctor or urologist if:    You have a fever or shaking chills.    You have bladder spasms for more than 1 day after the catheter is placed.    Your urine has a strong smell.    You have a rash or itching where the catheter tube is secured to your skin.    Urine leaks from or around the catheter, tubing, or drainage bag.    The closed drainage system has accidently come open or apart.    You see a layer of crystals inside the tubing.    You have questions or concerns about your condition or care.  Care for your catheter and drainage bag: You can reduce your risk for infection and injury by caring for your catheter and drainage bag properly.    Wash your hands often. Wash before and after you touch your catheter, tubing, or drainage bag. Use soap and water. Wear clean disposable gloves when you care for your catheter or disconnect the drainage bag. Wash your hands before you prepare or eat food.  Handwashing      Clean your genital area 2 times every day. Clean your catheter area and anal opening after every bowel movement. Use a soapy cloth to clean the area:  If you are male, clean the tip of your penis. Start where the catheter enters. Wipe backward, making sure to pull back the foreskin. Then use a cloth with clear water in the same direction to clean away the soap.    If you are female, clean the area where the catheter enters your body. Separate your labia (the smaller folds of your skin around your vaginal opening) and wipe toward the anus. Then use a cloth with clear water and wipe in the same direction.    Secure the catheter tube so you do not pull or move the catheter. This helps prevent pain and bladder spasms. Healthcare providers will show you how to use medical tape or a strap to secure the catheter tube to your body.    Keep a closed drainage system. Your catheter should always be attached to the drainage bag to form a closed system. Do not disconnect any part of the closed system unless you need to change the bag.    Keep the drainage bag below the level of your waist. This helps stop urine from moving back up the tubing and into your bladder. Do not loop or kink the tubing. This can cause urine to back up and collect in your bladder. Do not let the drainage bag touch or lie on the floor.    Empty the drainage bag when needed. The weight of a full drainage bag can be painful. Empty the drainage bag every 3 to 6 hours or when it is ? full.    Clean and change the drainage bag as directed. Ask your healthcare provider how often you should change the drainage bag and which cleaning solution to use. Wear disposable gloves when you change the bag. Do not allow the end of the catheter or tubing to touch anything. Clean the ends with an alcohol pad before you reconnect them.    Drink liquids as directed. This will help keep your urine clear and prevent catheter blockage and infection. Good choices for most people include water, juice, and milk. Ask how much liquid to drink each day and which liquids are best for you.  What to do if problems develop:    No urine is draining into the bag:  Check for kinks in the tubing and straighten them out.    Check the tape or strap used to secure the catheter tube to your skin. Make sure it is not blocking the tube.    Make sure you are not sitting or lying on the tubing.    Make sure the urine bag is hanging below the level of your waist.    Urine leaks from or around the catheter, tubing, or drainage bag: Check if the closed drainage system has accidently come open or apart. Clean the catheter and tubing ends with a new alcohol pad and reconnect them.  Follow up with your doctor or urologist as directed: Write down your questions so you remember to ask them during your visits.    How to Change a Catheter Drainage Bag    WHAT YOU NEED TO KNOW:    The catheter is attached to a drainage bag that holds the urine. A large drainage bag is usually used during the night. A leg bag can be worn under your clothes during the day. The leg bag is worn around your calf or thigh. It allows you to be in public without anyone knowing you have a catheter. A leg bag will have to be emptied frequently because it is not as big as the large bag.        DISCHARGE INSTRUCTIONS:    Call your doctor if:    Your catheter comes out.    You have a fever.    You have material that looks like sand in the tubing or drainage bag.    You see blood in the tubing or drainage bag.    You see a layer of crystals inside the tubing.    You have questions or concerns about your condition or care.  Attach or remove a drainage bag: You use the same steps to attach or remove a large drainage bag and a leg bag.    Gather supplies:  Large drainage bag    Clean leg bag    A clean towel    Alcohol pads    Urinary Drainage Bags      Wash your hands with soap and water for at least 20 seconds.  Handwashing      Empty the drainage bag. Do not touch the tip against the container or the toilet as you empty the urine.    Place the clean towel under the connection between the catheter tube and the drainage bag tubing. The towel will catch urine that may leak out of the tubing.    Pinch and hold the catheter tubing. Disconnect the tubing from the bag by using a twisting motion. Be careful not to pull on the catheter. Place the disconnected bag on the towel.    Clean the tip of the tubing on the bag to be connected. Use an alcohol pad. Start at the tip and clean towards the bag. You may have to hold the bag tubing in the same hand as the pinched catheter tubing while you are cleaning the tip. Do not allow the tip to touch the catheter tubing.    Connect the bag tubing to the catheter tubing. Place the tip into the catheter tubing with a twisting motion until it is securely connected.    Use straps to fasten the bag to your calf if you are attaching a leg bag. You may need to tape the catheter tubing to your thigh. Be careful not to pull the catheter tubing tight. Damage can be done to your urethra and bladder.    Wash your hands with soap and water for at least 20 seconds.  What you need to know about drainage bags:    Wash your hands with soap and water before and after emptying the drainage bag.    Empty your drainage bag when it is half full throughout the day. Also empty the large drainage bag when you wake in the morning or from a nap.    Make sure to clamp or twist the drainage bag closed after emptying.    Put a cap on the end of the connection tubing to prevent germs in the tubing.  Prevent problems with your catheter and drainage bag:    Drink liquids as directed. Ask your healthcare provider how much liquid to drink each day and which liquids are best for you. Liquids will help flush your kidneys and bladder to help prevent infection.    Check for kinks in the tubing and straighten them out.    Check the tape or strap used to secure the catheter tube to your skin. Make sure it is not blocking the tube.    Make sure you are not sitting or lying on the tubing.    Make sure the urine bag is hanging below the level of your waist.  Follow up with your doctor as directed: Write down your questions so you remember to ask them during your visits.

## 2025-01-17 NOTE — ED PROVIDER NOTE - OBJECTIVE STATEMENT
60 yo male with h/o BPH, chronic indwelling valdez catheter, present to Er c/o bleeding from his penis  around the catheter. Pt states he didn't changed his cathteter and leg bag for more than 2 month. Last 2 weeks noted bleeding around the catheter. Pt states today was bleeding with clots, which made him very concerned. Pt denies fever, chills abdominal pain, diarrhea constipations.

## 2025-01-18 RX ORDER — CIPROFLOXACIN HYDROCHLORIDE 500 MG/1
1 TABLET, FILM COATED ORAL
Qty: 14 | Refills: 0
Start: 2025-01-18 | End: 2025-01-24

## 2025-01-24 ENCOUNTER — EMERGENCY (EMERGENCY)
Facility: HOSPITAL | Age: 60
LOS: 1 days | Discharge: HOME | End: 2025-01-24
Attending: EMERGENCY MEDICINE | Admitting: EMERGENCY MEDICINE
Payer: MEDICAID

## 2025-01-24 VITALS
HEART RATE: 96 BPM | OXYGEN SATURATION: 99 % | TEMPERATURE: 98 F | RESPIRATION RATE: 18 BRPM | SYSTOLIC BLOOD PRESSURE: 142 MMHG | WEIGHT: 195.99 LBS | DIASTOLIC BLOOD PRESSURE: 85 MMHG | HEIGHT: 72 IN

## 2025-01-24 DIAGNOSIS — N40.0 BENIGN PROSTATIC HYPERPLASIA WITHOUT LOWER URINARY TRACT SYMPTOMS: ICD-10-CM

## 2025-01-24 DIAGNOSIS — R10.30 LOWER ABDOMINAL PAIN, UNSPECIFIED: ICD-10-CM

## 2025-01-24 DIAGNOSIS — T83.9XXA UNSPECIFIED COMPLICATION OF GENITOURINARY PROSTHETIC DEVICE, IMPLANT AND GRAFT, INITIAL ENCOUNTER: ICD-10-CM

## 2025-01-24 LAB
APPEARANCE UR: CLEAR — SIGNIFICANT CHANGE UP
BILIRUB UR-MCNC: NEGATIVE — SIGNIFICANT CHANGE UP
COLOR SPEC: YELLOW — SIGNIFICANT CHANGE UP
DIFF PNL FLD: ABNORMAL
GLUCOSE UR QL: NEGATIVE MG/DL — SIGNIFICANT CHANGE UP
KETONES UR-MCNC: 80 MG/DL
LEUKOCYTE ESTERASE UR-ACNC: ABNORMAL
NITRITE UR-MCNC: NEGATIVE — SIGNIFICANT CHANGE UP
PH UR: 6 — SIGNIFICANT CHANGE UP (ref 5–8)
PROT UR-MCNC: 30 MG/DL
SP GR SPEC: 1.01 — SIGNIFICANT CHANGE UP (ref 1–1.03)
UROBILINOGEN FLD QL: 0.2 MG/DL — SIGNIFICANT CHANGE UP (ref 0.2–1)

## 2025-01-24 PROCEDURE — 99283 EMERGENCY DEPT VISIT LOW MDM: CPT

## 2025-01-24 PROCEDURE — 81001 URINALYSIS AUTO W/SCOPE: CPT

## 2025-01-24 NOTE — ED PROVIDER NOTE - NSFOLLOWUPINSTRUCTIONS_ED_ALL_ED_FT
Follow-up with urology    Jacques Catheter Placement and Care    WHAT YOU NEED TO KNOW:    A Jacques catheter is a sterile tube that is inserted into your bladder to drain urine. It is also called an indwelling urinary catheter.    DISCHARGE INSTRUCTIONS:    Return to the emergency department if:    Your catheter comes out.    You suddenly have material that looks like sand in the tubing or drainage bag.    You see blood in the tubing or drainage bag.    Little or no urine is draining into the bag, and you have checked the system.    You have pain in your hip, back, pelvis, or lower abdomen.    You are confused or cannot think clearly.    You see swelling, redness, pus, or burning where the catheter goes into your body.  Call your doctor or urologist if:    You have a fever or shaking chills.    You have bladder spasms for more than 1 day after the catheter is placed.    Your urine has a strong smell.    You have a rash or itching where the catheter tube is secured to your skin.    Urine leaks from or around the catheter, tubing, or drainage bag.    The closed drainage system has accidently come open or apart.    You see a layer of crystals inside the tubing.    You have questions or concerns about your condition or care.  Care for your catheter and drainage bag: You can reduce your risk for infection and injury by caring for your catheter and drainage bag properly.    Wash your hands often. Wash before and after you touch your catheter, tubing, or drainage bag. Use soap and water. Wear clean disposable gloves when you care for your catheter or disconnect the drainage bag. Wash your hands before you prepare or eat food.    Clean your genital area 2 times every day. Clean your catheter area and anal opening after every bowel movement. Use a soapy cloth to clean the area:  If you are male, clean the tip of your penis. Start where the catheter enters. Wipe backward, making sure to pull back the foreskin. Then use a cloth with clear water in the same direction to clean away the soap.    If you are female, clean the area where the catheter enters your body. Separate your labia (the smaller folds of your skin around your vaginal opening) and wipe toward the anus. Then use a cloth with clear water and wipe in the same direction.    Secure the catheter tube so you do not pull or move the catheter. This helps prevent pain and bladder spasms. Healthcare providers will show you how to use medical tape or a strap to secure the catheter tube to your body.    Keep a closed drainage system. Your catheter should always be attached to the drainage bag to form a closed system. Do not disconnect any part of the closed system unless you need to change the bag.    Keep the drainage bag below the level of your waist. This helps stop urine from moving back up the tubing and into your bladder. Do not loop or kink the tubing. This can cause urine to back up and collect in your bladder. Do not let the drainage bag touch or lie on the floor.    Empty the drainage bag when needed. The weight of a full drainage bag can be painful. Empty the drainage bag every 3 to 6 hours or when it is ? full.    Clean and change the drainage bag as directed. Ask your healthcare provider how often you should change the drainage bag and which cleaning solution to use. Wear disposable gloves when you change the bag. Do not allow the end of the catheter or tubing to touch anything. Clean the ends with an alcohol pad before you reconnect them.    Drink liquids as directed. This will help keep your urine clear and prevent catheter blockage and infection. Good choices for most people include water, juice, and milk. Ask how much liquid to drink each day and which liquids are best for you.  What to do if problems develop:    No urine is draining into the bag:  Check for kinks in the tubing and straighten them out.    Check the tape or strap used to secure the catheter tube to your skin. Make sure it is not blocking the tube.    Make sure you are not sitting or lying on the tubing.    Make sure the urine bag is hanging below the level of your waist.    Urine leaks from or around the catheter, tubing, or drainage bag: Check if the closed drainage system has accidently come open or apart. Clean the catheter and tubing ends with a new alcohol pad and reconnect them.  Follow up with your doctor or urologist as directed: Write down your questions so you remember to ask them during your visits.

## 2025-01-24 NOTE — ED ADULT TRIAGE NOTE - CHIEF COMPLAINT QUOTE
"my catheter is not draining urine since this evening" ; with lower abdo pains; denies fever or chills

## 2025-01-24 NOTE — ED PROVIDER NOTE - OBJECTIVE STATEMENT
59M hx BPH, indwelling valdez, c/o valdez not draining. states ongoing since the evening. no fevers. no vomiting. some lower abd discomfort. pt seen in ED 1/17 for bleeding around valdez and valdez was exchanged at that time. pt currently on cefpodoxime.

## 2025-01-24 NOTE — ED PROVIDER NOTE - CLINICAL SUMMARY MEDICAL DECISION MAKING FREE TEXT BOX
valdez not draining, recently changed 1/17, lower abd discomfort. RN flushed catheter, now draining yellow urine. no abd pain currently. taking his abx. recommend f/u with .    I have discussed the discharge plan with the patient. The patient agrees with the plan, as discussed.  The patient understands Emergency Department diagnosis is a preliminary diagnosis often based on limited information and that the patient must adhere to the follow-up plan as discussed.  The patient understands that if the symptoms worsen the patient may return to the Emergency Department at any time for further evaluation and treatment.

## 2025-01-24 NOTE — ED PROVIDER NOTE - PATIENT PORTAL LINK FT
You can access the FollowMyHealth Patient Portal offered by [facility] by registering at the following website: [email or web address]. By joining [employer]’s FollowMyHealth portal, you will also be able to view your health information using other applications (apps) compatible with our system.

## 2025-01-24 NOTE — ED ADULT NURSE NOTE - OBJECTIVE STATEMENT
pt c/o urinary catheter complications, states that the valdez was not draining for the past few hours. bladder scanned, and showed 300mL of urine. cathter flushed and drained of urine. states that he is already on antibiotics for a UTI. denies any pain at this time

## 2025-01-24 NOTE — ED ADULT NURSE NOTE - HISTORY OF COVID-19 VACCINATION
PAST MEDICAL HISTORY:  BPH (benign prostatic hyperplasia)     Chronic low back pain intermittent    Diabetic peripheral neuropathy Lower extremities    Erectile dysfunction     Former smoker quit 6 years ago ( 1PPD/20+ years )    Hyperlipidemia     Hypertension     Osteoarthritis     Type 2 diabetes mellitus HgA1c 7% ( 10/11/19)     No

## 2025-02-07 ENCOUNTER — EMERGENCY (EMERGENCY)
Facility: HOSPITAL | Age: 60
LOS: 1 days | Discharge: HOME | End: 2025-02-07
Attending: STUDENT IN AN ORGANIZED HEALTH CARE EDUCATION/TRAINING PROGRAM | Admitting: STUDENT IN AN ORGANIZED HEALTH CARE EDUCATION/TRAINING PROGRAM
Payer: MEDICAID

## 2025-02-07 VITALS
HEART RATE: 84 BPM | OXYGEN SATURATION: 98 % | TEMPERATURE: 98 F | DIASTOLIC BLOOD PRESSURE: 76 MMHG | SYSTOLIC BLOOD PRESSURE: 132 MMHG | RESPIRATION RATE: 18 BRPM

## 2025-02-07 VITALS
WEIGHT: 190.04 LBS | RESPIRATION RATE: 19 BRPM | HEART RATE: 111 BPM | DIASTOLIC BLOOD PRESSURE: 72 MMHG | HEIGHT: 72 IN | OXYGEN SATURATION: 96 % | SYSTOLIC BLOOD PRESSURE: 142 MMHG | TEMPERATURE: 98 F

## 2025-02-07 DIAGNOSIS — T83.9XXA UNSPECIFIED COMPLICATION OF GENITOURINARY PROSTHETIC DEVICE, IMPLANT AND GRAFT, INITIAL ENCOUNTER: ICD-10-CM

## 2025-02-07 DIAGNOSIS — N40.0 BENIGN PROSTATIC HYPERPLASIA WITHOUT LOWER URINARY TRACT SYMPTOMS: ICD-10-CM

## 2025-02-07 DIAGNOSIS — Z87.440 PERSONAL HISTORY OF URINARY (TRACT) INFECTIONS: ICD-10-CM

## 2025-02-07 LAB
APPEARANCE UR: CLEAR — SIGNIFICANT CHANGE UP
BILIRUB UR-MCNC: NEGATIVE — SIGNIFICANT CHANGE UP
COLOR SPEC: YELLOW — SIGNIFICANT CHANGE UP
DIFF PNL FLD: ABNORMAL
GLUCOSE UR QL: NEGATIVE MG/DL — SIGNIFICANT CHANGE UP
KETONES UR-MCNC: NEGATIVE MG/DL — SIGNIFICANT CHANGE UP
LEUKOCYTE ESTERASE UR-ACNC: ABNORMAL
NITRITE UR-MCNC: NEGATIVE — SIGNIFICANT CHANGE UP
PH UR: 7 — SIGNIFICANT CHANGE UP (ref 5–8)
PROT UR-MCNC: SIGNIFICANT CHANGE UP MG/DL
SP GR SPEC: 1.02 — SIGNIFICANT CHANGE UP (ref 1–1.03)
UROBILINOGEN FLD QL: 1 MG/DL — SIGNIFICANT CHANGE UP (ref 0.2–1)

## 2025-02-07 PROCEDURE — 81001 URINALYSIS AUTO W/SCOPE: CPT

## 2025-02-07 PROCEDURE — 99284 EMERGENCY DEPT VISIT MOD MDM: CPT

## 2025-02-07 PROCEDURE — 51702 INSERT TEMP BLADDER CATH: CPT

## 2025-02-07 PROCEDURE — 99283 EMERGENCY DEPT VISIT LOW MDM: CPT | Mod: 25

## 2025-02-07 NOTE — ED ADULT NURSE NOTE - OBJECTIVE STATEMENT
Pt is a 59yr old male coming in with complaints of clogged valdez since last night. Pt notes urine output was very little and he is now having some abdominal/bladder pain. Last time the valdez was changed was around 3 weeks ago. Pt denies any other sxs, no fever, CP/SOB. Pt with hx of BPH.

## 2025-02-07 NOTE — ED ADULT NURSE REASSESSMENT NOTE - NS ED NURSE REASSESS COMMENT FT1
Pt stable for discharge; VS WNL. Able to ambulate with steady gait.  metro card given as per request

## 2025-02-07 NOTE — ED ADULT NURSE REASSESSMENT NOTE - NS ED NURSE REASSESS COMMENT FT1
Pt old valdez taken out. Attempted to insert 16 F valdez with sterile technique. However met resistance and unable to urine. Subsequent with 14F successful. 2nd RN at bedside for procedure

## 2025-02-07 NOTE — ED PROVIDER NOTE - PHYSICAL EXAMINATION
Constitutional: awake and alert, in no acute distress  HEENT: head normocephalic and atraumatic. moist mucous membranes  Eyes: extraocular movements intact, normal conjunctiva  Neck: supple, normal ROM  Cardiovascular: regular rate   Pulmonary: no respiratory distress  Gastrointestinal: abdomen  soft, some suprapubic tenderness and distention,   Jacques in place, clear urine in the tube and very small amount of urine in the leg bag, no testicular tenderness, no mass, no discoloration.   Skin: warm, dry, normal for ethnicity  Musculoskeletal: no edema, no deformity, NROM  Neurological: oriented x4, no focal neurologic deficit.   Psychiatric: calm and cooperative, no SI/HI

## 2025-02-07 NOTE — ED PROVIDER NOTE - OBJECTIVE STATEMENT
59M hx BPH, indwelling valdez, c/o valdez not draining.  Pt states ongoing for the past few hours.  Pt c/o  some lower abd discomfort., no blood in the urine, no fevers. no vomiting. pt seen in ED 1/17 for bleeding around valdez and valdez was exchanged at that time, he was seen again on 01/24 for clogged valdez  and after irrigation valdez was draining well , he was d/jian home for out pt f/u. Pt states he had no chance to see a urologist yet.  Last time take abx for UTI mid January.

## 2025-02-07 NOTE — ED PROVIDER NOTE - ATTENDING APP SHARED VISIT CONTRIBUTION OF CARE
59M hx BPH, indwelling valdez, c/o valdez not draining.  Pt states ongoing for the past few hours.  Pt c/o  some lower abd discomfort., no blood in the urine, no fevers. no vomiting. pt seen in ED 1/17 for bleeding around valdez and valdez was exchanged at that time, he was seen again on 01/24 for clogged valdez  and after irrigation valdez was draining well , he was d/jian home for out pt f/u. Pt states he had no chance to see a urologist yet.  Last time take abx for UTI mid January.  Pt well appearing, afebrile, + suprapubic TTP, no bleeding in the valdez or around, almost empty leg bag. plan for valdez replacement.

## 2025-02-07 NOTE — ED ADULT TRIAGE NOTE - CHIEF COMPLAINT QUOTE
Pt c/o valdez cath being clogged. Pt endorsed he first noticed the urine decrease last night. Last time valdez was reportedly changed was approx 3 weeks ago. Pt endorsed some abd discomfort.

## 2025-02-07 NOTE — ED PROVIDER NOTE - NSFOLLOWUPINSTRUCTIONS_ED_ALL_ED_FT
Please follow up with urologist for further evaluation and treatment.     PLEASE CONTACT USMAN VELASCO ([facility] EMERGENCY DEPARTMENT CLINICAL REFERRAL COORDINATOR) TO ASSIST IN SCHEDULING YOUR FOLLOW-UP APPOINTMENT  WITH UROLOGIST    Monday - Friday 11am-7pm  [phone]  [email or web address]

## 2025-02-07 NOTE — ED ADULT NURSE NOTE - EXTENSIONS OF SELF_ADULT
Problem: Infant Inpatient Plan of Care  Goal: Plan of Care Review  Pt progressing well. NAD noted. VSS. Pt breastfeeding well. POC discussed with mother. Understanding verbalized.           None

## 2025-02-07 NOTE — ED PROVIDER NOTE - NSFOLLOWUPCLINICS_GEN_ALL_ED_FT
[facility] Urology Clinic  Urology  [address], [address]  [place], [place] [address]  Phone: [phone]  Fax:

## 2025-02-07 NOTE — ED ADULT NURSE NOTE - NSFALLUNIVINTERV_ED_ALL_ED
Bed/Stretcher in lowest position, wheels locked, appropriate side rails in place/Call bell, personal items and telephone in reach/Instruct patient to call for assistance before getting out of bed/chair/stretcher/Non-slip footwear applied when patient is off stretcher/[place] to call system/Physically safe environment - no spills, clutter or unnecessary equipment/Purposeful proactive rounding/Room/bathroom lighting operational, light cord in reach

## 2025-02-07 NOTE — ED PROVIDER NOTE - PATIENT PORTAL LINK FT
You can access the FollowMyHealth Patient Portal offered by [facility] by registering at the following website: [email or web address]. By joining [employer]’s FollowMyHealth portal, you will also be able to view your health information using other applications (apps) compatible with our system.

## 2025-03-24 NOTE — ED ADULT TRIAGE NOTE - AS HEIGHT TYPE
Detail Level: Detailed Add 66482 Cpt? (Important Note: In 2017 The Use Of 06298 Is Being Tracked By Cms To Determine Future Global Period Reimbursement For Global Periods): no stated

## 2025-04-15 NOTE — ED ADULT NURSE NOTE - NSFALLRSKOUTCOME_ED_ALL_ED
April 15, 2025     Patient: Dorinda Anne   YOB: 2018   Date of Visit: 4/15/2025       To Whom it May Concern:    Dorinda Anne was seen in my clinic on 4/15/2025 at 3:30 pm.     Please excuse Dorinda for his absence from school on the date listed above to be able to make his appointment.    Sincerely,         Ashli Abel MD    Medical information is confidential and cannot be disclosed without the written consent of the patient or his representative.      
Chief Complaint: .Kaci Farnam is here today for   Chief Complaint   Patient presents with   • Establish Care   • Physical   • Blood Pressure     Pt has high blood pressure per a Dr in [place]          Medications: medications verified and updated    Refills needed today?  YES    Latex allergy or sensitivity: No known latex allergy     Patient would like communication of their results via:  [employer]    Cell Phone:   Telephone Information:   Mobile [phone]     Okay to leave a message containing results? Yes    Patient's current [facility] status: Active.    Advanced directives: discussed    Care Teams Verified: Updated    Depression Screen: yes    Tobacco history: verified       Health Maintenance     Depression Screening (Yearly)  Never done    Meningococcal Serogroup B Vaccine (2 of 2 - Risk Bexsero 2-dose series)  Order placed this encounter    Cervical Cancer Screening (Pap Smear - Every 3 Years)  Overdue since 8/28/2020    COVID-19 Vaccine (3 - 2023-24 season)  Overdue since 9/1/2023           Following review of the above:  Pended orders    Note: Refer to final orders and clinician documentation.            
Universal Safety Interventions

## 2025-06-01 ENCOUNTER — EMERGENCY (EMERGENCY)
Facility: HOSPITAL | Age: 60
LOS: 1 days | End: 2025-06-01
Attending: EMERGENCY MEDICINE | Admitting: EMERGENCY MEDICINE
Payer: MEDICAID

## 2025-06-01 VITALS
SYSTOLIC BLOOD PRESSURE: 146 MMHG | DIASTOLIC BLOOD PRESSURE: 81 MMHG | WEIGHT: 190.04 LBS | OXYGEN SATURATION: 98 % | HEART RATE: 97 BPM | TEMPERATURE: 98 F | RESPIRATION RATE: 18 BRPM

## 2025-06-01 VITALS
SYSTOLIC BLOOD PRESSURE: 134 MMHG | HEART RATE: 77 BPM | RESPIRATION RATE: 16 BRPM | OXYGEN SATURATION: 95 % | DIASTOLIC BLOOD PRESSURE: 78 MMHG | TEMPERATURE: 98 F

## 2025-06-01 LAB
APPEARANCE UR: CLEAR — SIGNIFICANT CHANGE UP
BILIRUB UR-MCNC: NEGATIVE — SIGNIFICANT CHANGE UP
COLOR SPEC: YELLOW — SIGNIFICANT CHANGE UP
DIFF PNL FLD: ABNORMAL
GLUCOSE UR QL: NEGATIVE MG/DL — SIGNIFICANT CHANGE UP
KETONES UR QL: NEGATIVE MG/DL — SIGNIFICANT CHANGE UP
LEUKOCYTE ESTERASE UR-ACNC: ABNORMAL
NITRITE UR-MCNC: NEGATIVE — SIGNIFICANT CHANGE UP
PH UR: 6 — SIGNIFICANT CHANGE UP (ref 5–8)
PROT UR-MCNC: NEGATIVE MG/DL — SIGNIFICANT CHANGE UP
SP GR SPEC: 1.01 — SIGNIFICANT CHANGE UP (ref 1–1.03)
UROBILINOGEN FLD QL: 0.2 MG/DL — SIGNIFICANT CHANGE UP (ref 0.2–1)

## 2025-06-01 PROCEDURE — 87077 CULTURE AEROBIC IDENTIFY: CPT

## 2025-06-01 PROCEDURE — 87086 URINE CULTURE/COLONY COUNT: CPT

## 2025-06-01 PROCEDURE — 51702 INSERT TEMP BLADDER CATH: CPT

## 2025-06-01 PROCEDURE — 99283 EMERGENCY DEPT VISIT LOW MDM: CPT

## 2025-06-01 PROCEDURE — 87186 SC STD MICRODIL/AGAR DIL: CPT

## 2025-06-01 PROCEDURE — 81001 URINALYSIS AUTO W/SCOPE: CPT

## 2025-06-01 PROCEDURE — 99283 EMERGENCY DEPT VISIT LOW MDM: CPT | Mod: 25

## 2025-06-01 NOTE — ED ADULT NURSE REASSESSMENT NOTE - NS ED NURSE REASSESS COMMENT FT1
Pt indwelling urinary catheter removed. New 14 F indwelling urinary catheter inserted by CIERA Tanner using sterile technique witnessed by CIERA Cash. A blood clot came out when inserted and then clear, yellow urine started to flow.

## 2025-06-01 NOTE — ED PROVIDER NOTE - CLINICAL SUMMARY MEDICAL DECISION MAKING FREE TEXT BOX
59 M pmh bph w/ chronic indwelling valdez last exchanged 3 mons ago) p/w urinary retention and hematuria x 5 hours; started after kickboxing.  on exam vss, mild suprapubic distention, no ttp, no cvat, leg  bag w/ gross hematuria.  will exchange valdez, likely hematuria/retention from trauma during kickboxing.  valdez exchanged, clot passed and now clear yellow.  ua + le but no bacteria and no c/f infection.  will f/u with his uro outpt.  discussed strict return parameters

## 2025-06-01 NOTE — ED ADULT NURSE NOTE - MODE OF DISCHARGE
Anticoagulation Summary  As of 4/16/2018    INR goal:   2.0-3.0   TTR:   74.7 % (11.9 mo)   Today's INR:   2.1   Maintenance plan:   7.5 mg (5 mg x 1.5) on Wed, Sat; 5 mg (5 mg x 1) all other days   Weekly total:   40 mg   Plan last modified:   Ezequiel Coombs, PharmD (4/16/2018)   Next INR check:   4/23/2018   Target end date:   Indefinite    Indications    DVT (deep venous thrombosis)  left (Resolved) [I82.402]  Hx pulmonary embolism [Z86.711]             Anticoagulation Episode Summary     INR check location:       Preferred lab:       Send INR reminders to:       Comments:   [facility]      Anticoagulation Care Providers     Provider Role Specialty Phone number    WENDY Baker Referring Family Medicine [phone]    [facility] Anticoagulation Services Responsible  [phone]        Anticoagulation Patient Findings        Spoke to patient on the phone.   INR  Therapeutic after decreasing a dose last week   Denies signs/symptoms of bleeding and/or thrombosis.   Denies changes to diet or medications.   Follow up appointment in 1 week(s).    Decrease weekly warfarin dose as noted      Ezequiel Coombs, PharmD       Ambulatory

## 2025-06-01 NOTE — ED ADULT TRIAGE NOTE - CHIEF COMPLAINT QUOTE
pt. hx of BPH, p/w difficulty urinating and blood droplets from penis x 4 hrs after finishing exercising.

## 2025-06-01 NOTE — ED ADULT NURSE NOTE - OBJECTIVE STATEMENT
60 yo M PMHx BPH, urinary retention presents to the ED co urinary retention. pt awake and alert, AOx4. Pt presents with 14F indwelling urinary catheter. Pt states, "it has been in for months. I was supposed to have it changed, but I didn't." Pt reports today after working up it stopped draining except for a couple drops of blood. Pt denies all other medical co.

## 2025-06-01 NOTE — ED PROVIDER NOTE - PATIENT PORTAL LINK FT
You can access the FollowMyHealth Patient Portal offered by [facility] by registering at the following website: [email or web address]. By joining [employer]’s FollowMyHealth portal, you will also be able to view your health information using other applications (apps) compatible with our system.

## 2025-06-01 NOTE — ED ADULT NURSE NOTE - NSFALLUNIVINTERV_ED_ALL_ED
Bed/Stretcher in lowest position, wheels locked, appropriate side rails in place/Call bell, personal items and telephone in reach/Instruct patient to call for assistance before getting out of bed/chair/stretcher/Non-slip footwear applied when patient is off stretcher/[place] to call system/Physically safe environment - no spills, clutter or unnecessary equipment/Purposeful proactive rounding/Room/bathroom lighting operational, light cord in reach

## 2025-06-01 NOTE — ED PROVIDER NOTE - ATTENDING APP SHARED VISIT CONTRIBUTION OF CARE
58 yo M h/o bph, indwelling valdez c/o gross hematuria and then onset of retention sx.  H/o similar issues.  Pt denies fever, n/v, flank pain, does not recall manipulating valdez but sx started while at gym.  Well appearing, nad, nc/at, lung cta, heart reg, abd soft, nt (after valdez flushed), ext no gross deformity, no gross neuro deficits   Pt w likely clot retention; plan to change valdez, consider irrigation prn cont gross hematuria; reassess.

## 2025-06-01 NOTE — ED PROVIDER NOTE - PHYSICAL EXAMINATION
Gen: well appearing, no acute distress  Skin: warm/dry, no rash noted  Resp: breathing comfortably, speaking in full sentences, no dyspnea  Abd: soft, mild suprapubic distention, no ttp, no cvat, leg  bag w/ gross hematuria  Neuro: alert/oriented, ambulatory

## 2025-06-01 NOTE — ED PROVIDER NOTE - NSFOLLOWUPINSTRUCTIONS_ED_ALL_ED_FT
Make sure to call your urologist and schedule follow up appointment within one week    Jacques Catheter Placement and Care    WHAT YOU NEED TO KNOW:    What is a Jacques catheter? A Jacques catheter is a thin, flexible, sterile tube that drains urine from your bladder. It is also called an indwelling urinary catheter. The tip of the catheter has a small balloon filled with solution that holds the catheter in your bladder.        How is a Jacques catheter placed? Your healthcare provider will clean your genital area to prevent infection. Your provider will insert the catheter into your urethra. When urine begins to flow into the tubing, the balloon is filled to keep the catheter in place. The open end of the catheter is attached to a drainage bag. Urine drains from your bladder, through the tube into the drainage bag.    Why is catheter care important? An infection can develop when bacteria get inside the catheter or drainage system. This can happen when the urine bag is changed or when a urine sample is collected. You can get an infection if you do not wash your hands. You can also get an infection if the catheter equipment is not cleaned properly. Urinary catheter-based infections can lead to serious illness. The following can help prevent infection:    Care for your catheter as directed. Follow directions on how to clean and care for your catheter, insertion site, and drainage bag.    Wash your hands often. Always wash with soap and water before and after you touch your catheter, tubing, or drainage bag. Wear clean medical gloves when you care for your catheter or disconnect the drainage bag. This will help stop germs from getting into your catheter. Remind anyone who cares for your catheter or drainage system to wash his or her hands.  Handwashing      Clean your genital area. Wash the catheter area 2 times every day. Wash your anal opening after every bowel movement. Use a soapy cloth to clean the area:  If you are male, clean the tip of your penis. Start where the catheter enters. Wipe backward, making sure to pull back the foreskin. Then use a cloth with clear water in the same direction to clean away the soap.    If you are female, clean the area where the catheter enters your body. Separate your labia (the smaller folds of your skin around your vaginal opening) and wipe toward the anus. Then use a cloth with clear water and wipe in the same direction.    Secure the catheter tube. Healthcare providers will show you how to use medical tape or a strap to secure the catheter tube to your leg. Do not pull or move the catheter. This helps prevent pain and bladder spasms.    Drink liquids as directed. This will help keep your urine clear and prevent catheter blockage and infection. Good choices for most people include water, juice, and milk. Ask how much liquid to drink each day and which liquids are best for you.  How do I care for my drainage bag? You will use a leg bag during the day. You may need a larger drainage bag at night.    Keep a closed drainage system. Your catheter should always be attached to the drainage bag to form a closed system. Do not disconnect any part of the closed system unless you need to change the bag.    Position the drainage bag properly. Keep the drainage bag below the level of your waist. This helps stop urine from moving back up the tubing and into your bladder. Do not loop or kink the tubing. This can cause urine to back up and collect in your bladder. Do not let the drainage bag touch or lie on the floor. Do not lie down or sleep with your drainage bag attached to your leg.    Empty the drainage bag when needed. The weight of a full drainage bag can pull on the catheter and cause pain. Empty the drainage bag every 3 to 6 hours or when it is ? full.    Clean and change the drainage bag as directed. Ask your healthcare provider how often you should change the drainage bag and which cleaning solution to use. Wear disposable gloves when you change the bag. Do not allow the end of the catheter or tubing to touch anything. Clean the ends with an alcohol pad before you reconnect them.  What can I do if problems develop?    No urine is draining into the bag:  Check for kinks in the tubing and straighten them out.    Check the tape or strap used to secure the catheter tube to your skin. Make sure it is not blocking the tube.    Make sure you are not sitting or lying on the tubing.    Make sure the urine bag is hanging below the level of your waist.    Urine leaks from or around the catheter, tubing, or drainage bag: Check if the closed drainage system has accidently come open or apart. Clean the catheter and tubing ends with a new alcohol pad and reconnect them.  When should I seek immediate care?    Your catheter comes out.    You suddenly have material that looks like sand in the tubing or drainage bag.    You see blood in the tubing or drainage bag.    Little or no urine is draining into the bag, and you have checked the system.    You have pain in your hip, back, pelvis, or lower abdomen.    You are confused or cannot think clearly.    You see swelling, redness, pus, or burning where the catheter goes into your body.  When should I call my doctor?    You have a fever or shaking chills.    You have bladder spasms for more than 1 day after the catheter is placed.    Your urine has a strong smell.    You have a rash or itching where the catheter tube is secured to your skin.    Urine leaks from or around the catheter, tubing, or drainage bag.    The closed drainage system has accidentally come open or apart.    You see a layer of crystals inside the tubing.    You have questions or concerns about your condition or care.  CARE AGREEMENT:    You have the right to help plan your care. Learn about your health condition and how it may be treated. Discuss treatment options with your healthcare providers to decide what care you want to receive. You always have the right to refuse treatment.

## 2025-06-01 NOTE — ED PROVIDER NOTE - OBJECTIVE STATEMENT
59 M pmh bph w/ chronic indwelling valdez last exchanged 3 mons ago) p/w urinary retention and hematuria x 5 hours.  pt reports after kickboxing this morning he noted gross hematuria and blood leaking around penis and since valdez had not been draining.  denies any trauma or pulling of catheter.  no use of AC.  denies any dysuria, abd pain, flank pain.  denies f/c, HA, dizziness, chest pain, nvd, or other cocnerns

## 2025-06-03 NOTE — ED PROVIDER NOTE - NS ED ATTENDING STATEMENT MOD
Goal Outcome Evaluation:    [id number]    Attending Only

## 2025-06-04 DIAGNOSIS — R82.998 OTHER ABNORMAL FINDINGS IN URINE: ICD-10-CM

## 2025-06-04 DIAGNOSIS — R31.0 GROSS HEMATURIA: ICD-10-CM

## 2025-06-04 DIAGNOSIS — R33.9 RETENTION OF URINE, UNSPECIFIED: ICD-10-CM

## 2025-06-04 DIAGNOSIS — Z96.0 PRESENCE OF UROGENITAL IMPLANTS: ICD-10-CM

## 2025-06-04 DIAGNOSIS — N40.1 BENIGN PROSTATIC HYPERPLASIA WITH LOWER URINARY TRACT SYMPTOMS: ICD-10-CM

## 2025-06-04 DIAGNOSIS — R14.0 ABDOMINAL DISTENSION (GASEOUS): ICD-10-CM

## 2025-06-04 DIAGNOSIS — R33.8 OTHER RETENTION OF URINE: ICD-10-CM

## 2025-07-14 ENCOUNTER — EMERGENCY (EMERGENCY)
Facility: HOSPITAL | Age: 60
LOS: 1 days | End: 2025-07-14
Attending: EMERGENCY MEDICINE | Admitting: EMERGENCY MEDICINE
Payer: MEDICAID

## 2025-07-14 VITALS
TEMPERATURE: 98 F | OXYGEN SATURATION: 99 % | HEART RATE: 75 BPM | DIASTOLIC BLOOD PRESSURE: 72 MMHG | SYSTOLIC BLOOD PRESSURE: 133 MMHG | RESPIRATION RATE: 18 BRPM

## 2025-07-14 VITALS
OXYGEN SATURATION: 95 % | HEART RATE: 100 BPM | RESPIRATION RATE: 17 BRPM | TEMPERATURE: 98 F | WEIGHT: 190.04 LBS | SYSTOLIC BLOOD PRESSURE: 124 MMHG | DIASTOLIC BLOOD PRESSURE: 79 MMHG

## 2025-07-14 LAB
APPEARANCE UR: ABNORMAL
BILIRUB UR-MCNC: NEGATIVE — SIGNIFICANT CHANGE UP
COLOR SPEC: YELLOW — SIGNIFICANT CHANGE UP
DIFF PNL FLD: ABNORMAL
GLUCOSE UR QL: NEGATIVE MG/DL — SIGNIFICANT CHANGE UP
KETONES UR QL: NEGATIVE MG/DL — SIGNIFICANT CHANGE UP
LEUKOCYTE ESTERASE UR-ACNC: ABNORMAL
NITRITE UR-MCNC: POSITIVE
PH UR: 6.5 — SIGNIFICANT CHANGE UP (ref 5–8)
PROT UR-MCNC: 100 MG/DL
SP GR SPEC: 1.02 — SIGNIFICANT CHANGE UP (ref 1–1.03)
UROBILINOGEN FLD QL: 1 MG/DL — SIGNIFICANT CHANGE UP (ref 0.2–1)

## 2025-07-14 PROCEDURE — 87086 URINE CULTURE/COLONY COUNT: CPT

## 2025-07-14 PROCEDURE — 87077 CULTURE AEROBIC IDENTIFY: CPT

## 2025-07-14 PROCEDURE — 51702 INSERT TEMP BLADDER CATH: CPT

## 2025-07-14 PROCEDURE — 81001 URINALYSIS AUTO W/SCOPE: CPT

## 2025-07-14 PROCEDURE — 99283 EMERGENCY DEPT VISIT LOW MDM: CPT | Mod: 25

## 2025-07-14 PROCEDURE — 99284 EMERGENCY DEPT VISIT MOD MDM: CPT

## 2025-07-14 PROCEDURE — 87186 SC STD MICRODIL/AGAR DIL: CPT

## 2025-07-14 RX ORDER — SULFAMETHOXAZOLE/TRIMETHOPRIM 800-160 MG
1 TABLET ORAL
Qty: 14 | Refills: 0
Start: 2025-07-14 | End: 2025-07-20

## 2025-07-14 RX ORDER — SULFAMETHOXAZOLE/TRIMETHOPRIM 800-160 MG
1 TABLET ORAL ONCE
Refills: 0 | Status: COMPLETED | OUTPATIENT
Start: 2025-07-14 | End: 2025-07-14

## 2025-07-14 RX ORDER — OFLOXACIN 300 MG
1 TABLET ORAL
Qty: 14 | Refills: 0
Start: 2025-07-14 | End: 2025-07-20

## 2025-07-14 RX ADMIN — Medication 1 TABLET(S): at 09:49

## 2025-07-14 NOTE — ED PROVIDER NOTE - CLINICAL SUMMARY MEDICAL DECISION MAKING FREE TEXT BOX
Patient is a 59-year-old male, with a past medical history of BPH, presenting to ED complaining of urinary catheter change x 2 months.  Patient was seen and examined and had a Jacques catheter change and the urinalysis showed a positive UTI.  Patient prescribed Bactrim and instructed to follow-up with his urologist tomorrow for further evaluation and management.

## 2025-07-14 NOTE — ED ADULT NURSE REASSESSMENT NOTE - NS ED NURSE REASSESS COMMENT FT1
received pt from RN. pt tolerating valdez catheter that was inserted by previous RN. denies any complaints. respirations equal and unlabored.

## 2025-07-14 NOTE — ED ADULT NURSE NOTE - OBJECTIVE STATEMENT
Male aox4 requesting valdez catheter change, states it has been 3 months since his catheter was placed. Denies any complaints. Pt changed into hospital gown.

## 2025-07-14 NOTE — ED PROVIDER NOTE - OBJECTIVE STATEMENT
Patient is a 59-year-old male with a past medical history of BPH, presents to the ED asking for a catheter change for his Jacques catheter.  Patient concerns he may have a UTI.  Denies fever, nausea, vomiting, diarrhea, flank pain.

## 2025-07-14 NOTE — ED ADULT TRIAGE NOTE - CHIEF COMPLAINT QUOTE
Pt states "I need to change my urinary catheter. It needs to be change every 4 weeks". Last urinary catheter changed 3 months ago. Pt denies fever or pain.

## 2025-07-14 NOTE — ED PROVIDER NOTE - ATTENDING APP SHARED VISIT CONTRIBUTION OF CARE
Pt with chronic valdez frequent ED visits for change now for valdez change.  No fever or chills.  Pt looks well, nad, no ttp, no cvat, vss.  Valdez replaced, culture sent.  Outpt tx and fu.

## 2025-07-14 NOTE — ED PROVIDER NOTE - PATIENT PORTAL LINK FT
You can access the FollowMyHealth Patient Portal offered by [facility] by registering at the following website: [email or web address]. By joining [facility]’s FollowMyHealth portal, you will also be able to view your health information using other applications (apps) compatible with our system.

## 2025-07-14 NOTE — ED PROVIDER NOTE - CARE PLAN
1 Principal Discharge DX:	Chronic UTI  Secondary Diagnosis:	Urinary catheter (Jacques) change required

## 2025-07-14 NOTE — ED PROVIDER NOTE - NSFOLLOWUPINSTRUCTIONS_ED_ALL_ED_FT
Take medications as prescribed.  Follow-up with your primary care provider or urologist tomorrow for further evaluation and management.    Urinary Tract Infection, Male  Body outline, showing the kidneys, ureters, bladder, and urethra.  A urinary tract infection (UTI) is an infection in your urinary tract. The urinary tract is made up of the organs that make, store, and get rid of pee (urine) in your body. These organs include:  The kidneys.  The ureters.  The bladder.  The urethra.  What are the causes?  Most UTIs are caused by germs called bacteria. They may be in or near your genitals. These germs grow and cause swelling in your urinary tract.    What increases the risk?  You're more likely to get a UTI if:  You have a soft tube called a catheter that drains your pee.  You can't control when you pee or poop.  You have trouble peeing because of:  A prostate that's bigger than normal.  A kidney stone.  A urinary blockage.  A nerve condition that affects your bladder.  Not getting enough to drink.  You're sexually active.  You're an older adult.  You're also more likely to get a UTI if you have other health problems, such as:  Diabetes.  A weak immune system. Your immune system is your body's defense system.  Sickle cell disease.  Injury of the spine.  What are the signs or symptoms?  Symptoms may include:  Needing to pee right away.  Peeing small amounts often.  Trouble getting the stream started.  Pain or burning when you pee.  Blood in your pee.  Pee that smells bad or odd.  Pain in your belly or lower back.  You may also:  Feel confused. This may be the first symptom in older adults.  Vomit.  Not feel hungry.  Feel tired or easily annoyed.  Have a fever or chills.  How is this diagnosed?  A UTI is diagnosed based on your medical history and an exam. You may also have other tests. These may include:  Pee tests.  Blood tests.  Tests for sexually transmitted infections (STIs).  If you've had more than one UTI, you may need to have imaging studies done to find out why you keep getting them.    How is this treated?  A UTI can be treated by:  Taking antibiotics or other medicines.  Drinking enough fluid to keep your pee pale yellow.  In rare cases, a UTI can cause a very bad condition called sepsis. Sepsis may be treated in the hospital.    Follow these instructions at home:  Medicines    Take your medicines only as told by your health care provider.  If you were given antibiotics, take them as told by your provider. Do not stop taking them even if you start to feel better.  General instructions    Make sure you:  Pee often and fully. Do not hold your pee for a long time.  Pee after you have sex.  Contact a health care provider if:  Your symptoms don't get better after 1–2 days of taking antibiotics.  Your symptoms go away and then come back.  You have a fever or chills.  You vomit or feel like you may vomit.  Get help right away if:  You have very bad pain in your back or lower belly.  You faint.  This information is not intended to replace advice given to you by your health care provider. Make sure you discuss any questions you have with your health care provider.    Document Revised: 11/27/2024 Document Reviewed: 03/22/2024  [employer] Patient Education © 2025 [employer]

## 2025-07-16 DIAGNOSIS — Z96.0 PRESENCE OF UROGENITAL IMPLANTS: ICD-10-CM

## 2025-07-16 DIAGNOSIS — N39.0 URINARY TRACT INFECTION, SITE NOT SPECIFIED: ICD-10-CM

## 2025-07-16 DIAGNOSIS — N40.0 BENIGN PROSTATIC HYPERPLASIA WITHOUT LOWER URINARY TRACT SYMPTOMS: ICD-10-CM

## 2025-08-19 ENCOUNTER — APPOINTMENT (OUTPATIENT)
Dept: UROLOGY | Facility: CLINIC | Age: 60
End: 2025-08-19

## 2025-08-27 ENCOUNTER — EMERGENCY (EMERGENCY)
Facility: HOSPITAL | Age: 60
LOS: 1 days | End: 2025-08-27
Attending: EMERGENCY MEDICINE | Admitting: EMERGENCY MEDICINE
Payer: MEDICAID

## 2025-08-27 VITALS
SYSTOLIC BLOOD PRESSURE: 123 MMHG | HEART RATE: 89 BPM | WEIGHT: 199.96 LBS | DIASTOLIC BLOOD PRESSURE: 81 MMHG | RESPIRATION RATE: 18 BRPM | OXYGEN SATURATION: 99 % | TEMPERATURE: 99 F

## 2025-08-27 VITALS
RESPIRATION RATE: 18 BRPM | TEMPERATURE: 99 F | DIASTOLIC BLOOD PRESSURE: 73 MMHG | HEART RATE: 83 BPM | SYSTOLIC BLOOD PRESSURE: 140 MMHG | OXYGEN SATURATION: 100 %

## 2025-08-27 LAB
ANION GAP SERPL CALC-SCNC: 9 MMOL/L — SIGNIFICANT CHANGE UP (ref 5–17)
APPEARANCE UR: CLEAR — SIGNIFICANT CHANGE UP
BILIRUB UR-MCNC: NEGATIVE — SIGNIFICANT CHANGE UP
BUN SERPL-MCNC: 14 MG/DL — SIGNIFICANT CHANGE UP (ref 7–23)
CALCIUM SERPL-MCNC: 8.9 MG/DL — SIGNIFICANT CHANGE UP (ref 8.4–10.5)
CHLORIDE SERPL-SCNC: 100 MMOL/L — SIGNIFICANT CHANGE UP (ref 96–108)
CO2 SERPL-SCNC: 28 MMOL/L — SIGNIFICANT CHANGE UP (ref 22–31)
COLOR SPEC: YELLOW — SIGNIFICANT CHANGE UP
CREAT SERPL-MCNC: 0.79 MG/DL — SIGNIFICANT CHANGE UP (ref 0.5–1.3)
DIFF PNL FLD: ABNORMAL
EGFR: 102 ML/MIN/1.73M2 — SIGNIFICANT CHANGE UP
EGFR: 102 ML/MIN/1.73M2 — SIGNIFICANT CHANGE UP
GLUCOSE SERPL-MCNC: 113 MG/DL — HIGH (ref 70–99)
GLUCOSE UR QL: NEGATIVE MG/DL — SIGNIFICANT CHANGE UP
HCT VFR BLD CALC: 37.9 % — LOW (ref 39–50)
HGB BLD-MCNC: 12.2 G/DL — LOW (ref 13–17)
KETONES UR QL: NEGATIVE MG/DL — SIGNIFICANT CHANGE UP
LEUKOCYTE ESTERASE UR-ACNC: ABNORMAL
MCHC RBC-ENTMCNC: 29.6 PG — SIGNIFICANT CHANGE UP (ref 27–34)
MCHC RBC-ENTMCNC: 32.2 G/DL — SIGNIFICANT CHANGE UP (ref 32–36)
MCV RBC AUTO: 92 FL — SIGNIFICANT CHANGE UP (ref 80–100)
NITRITE UR-MCNC: NEGATIVE — SIGNIFICANT CHANGE UP
NRBC # BLD AUTO: 0 K/UL — SIGNIFICANT CHANGE UP (ref 0–0)
NRBC # FLD: 0 K/UL — SIGNIFICANT CHANGE UP (ref 0–0)
NRBC BLD AUTO-RTO: 0 /100 WBCS — SIGNIFICANT CHANGE UP (ref 0–0)
PH UR: 7.5 — SIGNIFICANT CHANGE UP (ref 5–8)
PLATELET # BLD AUTO: 193 K/UL — SIGNIFICANT CHANGE UP (ref 150–400)
PMV BLD: 8.8 FL — SIGNIFICANT CHANGE UP (ref 7–13)
POTASSIUM SERPL-MCNC: 3.9 MMOL/L — SIGNIFICANT CHANGE UP (ref 3.5–5.3)
POTASSIUM SERPL-SCNC: 3.9 MMOL/L — SIGNIFICANT CHANGE UP (ref 3.5–5.3)
PROT UR-MCNC: 30 MG/DL
RBC # BLD: 4.12 M/UL — LOW (ref 4.2–5.8)
RBC # FLD: 13.3 % — SIGNIFICANT CHANGE UP (ref 10.3–14.5)
SODIUM SERPL-SCNC: 137 MMOL/L — SIGNIFICANT CHANGE UP (ref 135–145)
SP GR SPEC: 1.01 — SIGNIFICANT CHANGE UP (ref 1–1.03)
UROBILINOGEN FLD QL: 1 MG/DL — SIGNIFICANT CHANGE UP (ref 0.2–1)
WBC # BLD: 5.39 K/UL — SIGNIFICANT CHANGE UP (ref 3.8–10.5)
WBC # FLD AUTO: 5.39 K/UL — SIGNIFICANT CHANGE UP (ref 3.8–10.5)

## 2025-08-27 PROCEDURE — 87077 CULTURE AEROBIC IDENTIFY: CPT

## 2025-08-27 PROCEDURE — 87186 SC STD MICRODIL/AGAR DIL: CPT

## 2025-08-27 PROCEDURE — 99284 EMERGENCY DEPT VISIT MOD MDM: CPT | Mod: 25

## 2025-08-27 PROCEDURE — 80048 BASIC METABOLIC PNL TOTAL CA: CPT

## 2025-08-27 PROCEDURE — 87086 URINE CULTURE/COLONY COUNT: CPT

## 2025-08-27 PROCEDURE — 85027 COMPLETE CBC AUTOMATED: CPT

## 2025-08-27 PROCEDURE — 96374 THER/PROPH/DIAG INJ IV PUSH: CPT

## 2025-08-27 PROCEDURE — 81001 URINALYSIS AUTO W/SCOPE: CPT

## 2025-08-27 PROCEDURE — 36415 COLL VENOUS BLD VENIPUNCTURE: CPT

## 2025-08-27 PROCEDURE — 99284 EMERGENCY DEPT VISIT MOD MDM: CPT

## 2025-08-27 RX ORDER — LIDOCAINE HCL/PF 10 MG/ML
10 VIAL (ML) INJECTION ONCE
Refills: 0 | Status: COMPLETED | OUTPATIENT
Start: 2025-08-27 | End: 2025-08-27

## 2025-08-27 RX ORDER — CEFPODOXIME PROXETIL 200 MG/1
1 TABLET, FILM COATED ORAL
Qty: 14 | Refills: 0
Start: 2025-08-27 | End: 2025-09-02

## 2025-08-27 RX ORDER — CEFTRIAXONE 500 MG/1
2000 INJECTION, POWDER, FOR SOLUTION INTRAMUSCULAR; INTRAVENOUS ONCE
Refills: 0 | Status: COMPLETED | OUTPATIENT
Start: 2025-08-27 | End: 2025-08-27

## 2025-08-27 RX ADMIN — CEFTRIAXONE 100 MILLIGRAM(S): 500 INJECTION, POWDER, FOR SOLUTION INTRAMUSCULAR; INTRAVENOUS at 03:57

## 2025-08-27 RX ADMIN — Medication 10 MILLILITER(S): at 02:27

## 2025-08-29 DIAGNOSIS — N40.0 BENIGN PROSTATIC HYPERPLASIA WITHOUT LOWER URINARY TRACT SYMPTOMS: ICD-10-CM

## 2025-08-29 DIAGNOSIS — Z46.6 ENCOUNTER FOR FITTING AND ADJUSTMENT OF URINARY DEVICE: ICD-10-CM

## 2025-08-29 LAB
-  AMIKACIN: SIGNIFICANT CHANGE UP
-  AMPICILLIN: SIGNIFICANT CHANGE UP
-  CEFEPIME: SIGNIFICANT CHANGE UP
-  CEFTAZIDIME: SIGNIFICANT CHANGE UP
-  CIPROFLOXACIN: SIGNIFICANT CHANGE UP
-  CIPROFLOXACIN: SIGNIFICANT CHANGE UP
-  IMIPENEM: SIGNIFICANT CHANGE UP
-  LEVOFLOXACIN: SIGNIFICANT CHANGE UP
-  LEVOFLOXACIN: SIGNIFICANT CHANGE UP
-  MEROPENEM: SIGNIFICANT CHANGE UP
-  NITROFURANTOIN: SIGNIFICANT CHANGE UP
-  PIPERACILLIN/TAZOBACTAM: SIGNIFICANT CHANGE UP
-  TETRACYCLINE: SIGNIFICANT CHANGE UP
-  VANCOMYCIN: SIGNIFICANT CHANGE UP
CULTURE RESULTS: ABNORMAL
METHOD TYPE: SIGNIFICANT CHANGE UP
METHOD TYPE: SIGNIFICANT CHANGE UP
ORGANISM # SPEC MICROSCOPIC CNT: ABNORMAL
ORGANISM # SPEC MICROSCOPIC CNT: ABNORMAL
ORGANISM # SPEC MICROSCOPIC CNT: SIGNIFICANT CHANGE UP
SPECIMEN SOURCE: SIGNIFICANT CHANGE UP

## 2025-08-30 RX ORDER — CIPROFLOXACIN HCL 250 MG
1 TABLET ORAL
Qty: 10 | Refills: 0
Start: 2025-08-30 | End: 2025-09-03